# Patient Record
Sex: FEMALE | Race: WHITE | NOT HISPANIC OR LATINO | Employment: OTHER | URBAN - METROPOLITAN AREA
[De-identification: names, ages, dates, MRNs, and addresses within clinical notes are randomized per-mention and may not be internally consistent; named-entity substitution may affect disease eponyms.]

---

## 2023-09-02 PROBLEM — D50.9 IRON DEFICIENCY ANEMIA: Status: ACTIVE | Noted: 2023-09-02

## 2023-09-02 PROBLEM — H35.3131 EARLY DRY STAGE NONEXUDATIVE AGE-RELATED MACULAR DEGENERATION OF BOTH EYES: Status: ACTIVE | Noted: 2023-09-02

## 2023-09-02 PROBLEM — S72.143A INTERTROCHANTERIC FRACTURE (MULTI): Status: ACTIVE | Noted: 2023-09-02

## 2023-09-02 PROBLEM — M17.12 OSTEOARTHRITIS OF LEFT KNEE: Status: ACTIVE | Noted: 2023-09-02

## 2023-09-02 PROBLEM — M81.0 OSTEOPOROSIS: Status: ACTIVE | Noted: 2023-09-02

## 2023-09-02 PROBLEM — H52.7 UNSPECIFIED DISORDER OF REFRACTION: Status: ACTIVE | Noted: 2023-09-02

## 2023-09-02 PROBLEM — R60.9 PERIPHERAL EDEMA: Status: ACTIVE | Noted: 2023-09-02

## 2023-09-02 PROBLEM — R60.0 PERIPHERAL EDEMA: Status: ACTIVE | Noted: 2023-09-02

## 2023-09-02 PROBLEM — S06.0X0A CONCUSSION WITHOUT LOSS OF CONSCIOUSNESS: Status: ACTIVE | Noted: 2023-09-02

## 2023-09-02 PROBLEM — S49.90XA SHOULDER INJURY: Status: ACTIVE | Noted: 2023-09-02

## 2023-09-02 PROBLEM — I87.2 VENOUS INSUFFICIENCY (CHRONIC) (PERIPHERAL): Status: ACTIVE | Noted: 2023-09-02

## 2023-09-02 PROBLEM — S42.213A CLOSED FRACTURE OF SURGICAL NECK OF HUMERUS: Status: ACTIVE | Noted: 2023-09-02

## 2023-09-02 PROBLEM — E11.65 HYPERGLYCEMIA DUE TO TYPE 2 DIABETES MELLITUS (MULTI): Status: ACTIVE | Noted: 2023-09-02

## 2023-09-02 PROBLEM — M19.90 OSTEOARTHRITIS: Status: ACTIVE | Noted: 2023-09-02

## 2023-09-02 PROBLEM — I10 HTN (HYPERTENSION), BENIGN: Status: ACTIVE | Noted: 2023-09-02

## 2023-09-02 PROBLEM — Z96.652 STATUS POST TOTAL LEFT KNEE REPLACEMENT: Status: ACTIVE | Noted: 2023-09-02

## 2023-09-02 PROBLEM — E11.21 DIABETIC NEPHROPATHY WITH PROTEINURIA (MULTI): Status: ACTIVE | Noted: 2023-09-02

## 2023-09-02 PROBLEM — I49.9 IRREGULAR HEART RHYTHM: Status: ACTIVE | Noted: 2023-09-02

## 2023-09-02 PROBLEM — F33.9 RECURRENT MAJOR DEPRESSIVE DISORDER (CMS-HCC): Status: ACTIVE | Noted: 2023-09-02

## 2023-09-02 PROBLEM — E78.2 MIXED HYPERLIPIDEMIA: Status: ACTIVE | Noted: 2023-09-02

## 2023-09-02 PROBLEM — M17.11 OSTEOARTHRITIS OF RIGHT KNEE: Status: ACTIVE | Noted: 2023-09-02

## 2023-09-02 PROBLEM — H35.349 DEGENERATION OF MACULA DUE TO CYST OR HOLE, UNSPECIFIED LATERALITY: Status: ACTIVE | Noted: 2023-09-02

## 2023-09-02 PROBLEM — F41.9 ANXIETY: Status: ACTIVE | Noted: 2023-09-02

## 2023-09-02 PROBLEM — M25.461 EFFUSION, RIGHT KNEE: Status: ACTIVE | Noted: 2023-09-02

## 2023-09-02 PROBLEM — E11.9 ABNORMAL METABOLIC STATE DUE TO DIABETES MELLITUS (MULTI): Status: ACTIVE | Noted: 2023-09-02

## 2023-09-02 PROBLEM — E11.8 CONTROLLED TYPE 2 DIABETES MELLITUS WITH COMPLICATION, WITHOUT LONG-TERM CURRENT USE OF INSULIN (MULTI): Status: ACTIVE | Noted: 2023-09-02

## 2023-09-02 PROBLEM — H25.13 AGE-RELATED NUCLEAR CATARACT OF BOTH EYES: Status: ACTIVE | Noted: 2023-09-02

## 2023-09-02 PROBLEM — R09.02 HYPOXIA: Status: ACTIVE | Noted: 2023-09-02

## 2023-09-02 PROBLEM — M25.361 INSTABILITY OF RIGHT KNEE JOINT: Status: ACTIVE | Noted: 2023-09-02

## 2023-09-02 PROBLEM — D64.9 ANEMIA: Status: ACTIVE | Noted: 2023-09-02

## 2023-09-02 PROBLEM — M17.9 OSTEOARTHRITIS OF KNEE: Status: ACTIVE | Noted: 2023-09-02

## 2023-09-02 PROBLEM — I82.90 VENOUS THROMBOSIS: Status: ACTIVE | Noted: 2023-09-02

## 2023-09-02 PROBLEM — B86 SCABIES: Status: ACTIVE | Noted: 2023-09-02

## 2023-09-02 PROBLEM — Z91.89 AT RISK FOR BLEEDING: Status: ACTIVE | Noted: 2023-09-02

## 2023-09-02 PROBLEM — R06.89 IMPAIRED GAS EXCHANGE: Status: ACTIVE | Noted: 2023-09-02

## 2023-09-02 PROBLEM — M84.453A: Status: ACTIVE | Noted: 2023-09-02

## 2023-09-02 RX ORDER — DOCUSATE SODIUM 100 MG/1
100 CAPSULE, LIQUID FILLED ORAL 2 TIMES DAILY PRN
COMMUNITY

## 2023-09-02 RX ORDER — ATORVASTATIN CALCIUM 20 MG/1
20 TABLET, FILM COATED ORAL DAILY
COMMUNITY
End: 2023-11-13 | Stop reason: SDUPTHER

## 2023-09-02 RX ORDER — IVERMECTIN 3 MG/1
TABLET ORAL
COMMUNITY
Start: 2017-11-03 | End: 2023-10-16 | Stop reason: ALTCHOICE

## 2023-09-02 RX ORDER — LANOLIN ALCOHOL/MO/W.PET/CERES
100 CREAM (GRAM) TOPICAL DAILY
COMMUNITY

## 2023-09-02 RX ORDER — ACETAMINOPHEN, ASPIRIN (NSAID), AND CAFFEINE 250; 250; 65 MG/1; MG/1; MG/1
2 TABLET, FILM COATED ORAL 2 TIMES DAILY PRN
COMMUNITY
End: 2023-10-16 | Stop reason: ALTCHOICE

## 2023-09-02 RX ORDER — LANCETS
EACH MISCELLANEOUS DAILY
COMMUNITY
Start: 2020-03-05 | End: 2023-12-29 | Stop reason: ENTERED-IN-ERROR

## 2023-09-02 RX ORDER — ACETAMINOPHEN 325 MG/1
650 TABLET ORAL EVERY 4 HOURS PRN
COMMUNITY

## 2023-09-02 RX ORDER — LOSARTAN POTASSIUM 100 MG/1
100 TABLET ORAL DAILY
COMMUNITY
End: 2023-11-13 | Stop reason: SDUPTHER

## 2023-09-02 RX ORDER — SODIUM BICARBONATE 650 MG/1
650 TABLET ORAL 3 TIMES DAILY
COMMUNITY

## 2023-09-02 RX ORDER — CITALOPRAM 40 MG/1
40 TABLET, FILM COATED ORAL DAILY
COMMUNITY

## 2023-09-02 RX ORDER — OXYCODONE AND ACETAMINOPHEN 5; 325 MG/1; MG/1
1 TABLET ORAL EVERY 6 HOURS PRN
COMMUNITY
End: 2023-10-16 | Stop reason: ALTCHOICE

## 2023-09-02 RX ORDER — BUSPIRONE HYDROCHLORIDE 10 MG/1
10 TABLET ORAL 3 TIMES DAILY
COMMUNITY
Start: 2023-02-21 | End: 2023-11-13 | Stop reason: SDUPTHER

## 2023-09-08 ENCOUNTER — HOSPITAL ENCOUNTER (OUTPATIENT)
Dept: DATA CONVERSION | Facility: HOSPITAL | Age: 78
End: 2023-09-08
Attending: INTERNAL MEDICINE | Admitting: INTERNAL MEDICINE
Payer: MEDICARE

## 2023-09-08 DIAGNOSIS — K80.50 CALCULUS OF BILE DUCT WITHOUT CHOLANGITIS OR CHOLECYSTITIS WITHOUT OBSTRUCTION: ICD-10-CM

## 2023-09-08 DIAGNOSIS — K86.89 OTHER SPECIFIED DISEASES OF PANCREAS (HHS-HCC): ICD-10-CM

## 2023-09-08 DIAGNOSIS — K80.51 CALCULUS OF BILE DUCT WITHOUT CHOLANGITIS OR CHOLECYSTITIS WITH OBSTRUCTION: ICD-10-CM

## 2023-09-29 VITALS — HEIGHT: 63 IN | WEIGHT: 149.91 LBS | BODY MASS INDEX: 26.56 KG/M2

## 2023-10-10 ENCOUNTER — TELEPHONE (OUTPATIENT)
Dept: PRIMARY CARE | Facility: CLINIC | Age: 78
End: 2023-10-10
Payer: MEDICARE

## 2023-10-10 NOTE — TELEPHONE ENCOUNTER
From [employer] machine 10/10/23 @12:23pm:  Bryn is asking If you will follow patient for home care?  Please advise @[phone] option 1.

## 2023-10-11 NOTE — TELEPHONE ENCOUNTER
Bryn called to follow-up and see if Roselyn would follow her for home care. I let him know that Roselyn has never seen the patient and she doesn't have an appointment scheduled to see her in the future. He said that he would let his team know.

## 2023-10-12 PROBLEM — R79.89 ELEVATED FERRITIN: Status: ACTIVE | Noted: 2023-10-12

## 2023-10-12 PROBLEM — E11.9 ABNORMAL METABOLIC STATE DUE TO DIABETES MELLITUS (MULTI): Status: RESOLVED | Noted: 2023-09-02 | Resolved: 2023-10-12

## 2023-10-12 NOTE — PROGRESS NOTES
Subjective   Patient ID:   Adriana Duran is a 78 y.o. female who presents for Establish Care and Diabetes.  HPI  Diabetes:  Not on medication for this.  Last A1C was 5.4 in Feb 2022.  POC today is 5.7.    HTN:  Taking Losartan.  BP today is 90/62.  Denies dizziness, headaches.    HLD:  Taking Atorvastatin.  Last checked Feb 2023.    Anxiety/Depression:  Taking Buspar and Celexa.  Denies SI/HI.    OA/Femur fracture:  Seeing ortho.    Elevated ferritin:  Seen in Sep 2023.    Left great toe:  Seeing podiatry.    Health maintenance:  Smoking: Former smoker.  Labs: Feb 2023. DUE for A1C  Influenza:    Review of Systems  12 point review of systems negative unless stated above in HPI    Vitals:    10/16/23 1131   BP: 90/62   Pulse: 85   SpO2: 99%     Physical Exam  General: Alert and oriented, well nourished, no acute distress.  Lungs: Clear to auscultation, non-labored respiration.  Heart: Normal rate, regular rhythm, no murmur, gallop or edema.  Neurologic: Awake, alert, and oriented X3, CN II-XII intact.  Psychiatric: Cooperative, appropriate mood and affect.    Assessment/Plan   It was nice meeting you!  We will check ferritin/iron levels next visit.  Continue specialty care.  A1C is stable.  Continue the same medications.  Chronic conditions are stable.  Call with questions or concerns.    Follow up  3-4 months for A1C/ferritin  Diagnoses and all orders for this visit:  Controlled type 2 diabetes mellitus with complication, without long-term current use of insulin (CMS/[facility])  -     POCT glycosylated hemoglobin (Hb A1C) manually resulted  Diabetic nephropathy with proteinuria (CMS/[facility])  HTN (hypertension), benign  Mixed hyperlipidemia  Anxiety  Osteoarthritis of right knee, unspecified osteoarthritis type  Recurrent major depressive disorder, in partial remission (CMS/[facility])  Elevated ferritin  Pathological fracture of neck of femur, unspecified laterality, sequela  Closed nondisplaced intertrochanteric fracture of  femur, unspecified laterality, sequela  Type 2 diabetes mellitus with hyperglycemia, unspecified whether long term insulin use (CMS/[facility])

## 2023-10-16 ENCOUNTER — OFFICE VISIT (OUTPATIENT)
Dept: PRIMARY CARE | Facility: CLINIC | Age: 78
End: 2023-10-16
Payer: MEDICARE

## 2023-10-16 VITALS
OXYGEN SATURATION: 99 % | SYSTOLIC BLOOD PRESSURE: 90 MMHG | DIASTOLIC BLOOD PRESSURE: 62 MMHG | WEIGHT: 172 LBS | BODY MASS INDEX: 30.55 KG/M2 | HEART RATE: 85 BPM

## 2023-10-16 DIAGNOSIS — S72.146S CLOSED NONDISPLACED INTERTROCHANTERIC FRACTURE OF FEMUR, UNSPECIFIED LATERALITY, SEQUELA: ICD-10-CM

## 2023-10-16 DIAGNOSIS — M84.453S: ICD-10-CM

## 2023-10-16 DIAGNOSIS — I10 HTN (HYPERTENSION), BENIGN: ICD-10-CM

## 2023-10-16 DIAGNOSIS — M17.11 OSTEOARTHRITIS OF RIGHT KNEE, UNSPECIFIED OSTEOARTHRITIS TYPE: ICD-10-CM

## 2023-10-16 DIAGNOSIS — R79.89 ELEVATED FERRITIN: ICD-10-CM

## 2023-10-16 DIAGNOSIS — E11.65 TYPE 2 DIABETES MELLITUS WITH HYPERGLYCEMIA, UNSPECIFIED WHETHER LONG TERM INSULIN USE (MULTI): ICD-10-CM

## 2023-10-16 DIAGNOSIS — F41.9 ANXIETY: ICD-10-CM

## 2023-10-16 DIAGNOSIS — E78.2 MIXED HYPERLIPIDEMIA: ICD-10-CM

## 2023-10-16 DIAGNOSIS — E11.8 CONTROLLED TYPE 2 DIABETES MELLITUS WITH COMPLICATION, WITHOUT LONG-TERM CURRENT USE OF INSULIN (MULTI): Primary | ICD-10-CM

## 2023-10-16 DIAGNOSIS — E11.21 DIABETIC NEPHROPATHY WITH PROTEINURIA (MULTI): ICD-10-CM

## 2023-10-16 DIAGNOSIS — F33.41 RECURRENT MAJOR DEPRESSIVE DISORDER, IN PARTIAL REMISSION (CMS-HCC): ICD-10-CM

## 2023-10-16 DIAGNOSIS — S91.109S OPEN TOE WOUND, SEQUELA: ICD-10-CM

## 2023-10-16 PROCEDURE — 99214 OFFICE O/P EST MOD 30 MIN: CPT | Performed by: PHYSICIAN ASSISTANT

## 2023-10-16 PROCEDURE — 1160F RVW MEDS BY RX/DR IN RCRD: CPT | Performed by: PHYSICIAN ASSISTANT

## 2023-10-16 PROCEDURE — 1159F MED LIST DOCD IN RCRD: CPT | Performed by: PHYSICIAN ASSISTANT

## 2023-10-16 PROCEDURE — 1036F TOBACCO NON-USER: CPT | Performed by: PHYSICIAN ASSISTANT

## 2023-10-16 PROCEDURE — 1126F AMNT PAIN NOTED NONE PRSNT: CPT | Performed by: PHYSICIAN ASSISTANT

## 2023-10-16 PROCEDURE — 1124F ACP DISCUSS-NO DSCNMKR DOCD: CPT | Performed by: PHYSICIAN ASSISTANT

## 2023-10-16 PROCEDURE — 3078F DIAST BP <80 MM HG: CPT | Performed by: PHYSICIAN ASSISTANT

## 2023-10-16 PROCEDURE — 3074F SYST BP LT 130 MM HG: CPT | Performed by: PHYSICIAN ASSISTANT

## 2023-10-16 ASSESSMENT — LIFESTYLE VARIABLES
HOW MANY STANDARD DRINKS CONTAINING ALCOHOL DO YOU HAVE ON A TYPICAL DAY: PATIENT DOES NOT DRINK
HOW OFTEN DO YOU HAVE SIX OR MORE DRINKS ON ONE OCCASION: NEVER
HOW OFTEN DO YOU HAVE A DRINK CONTAINING ALCOHOL: NEVER
AUDIT-C TOTAL SCORE: 0
SKIP TO QUESTIONS 9-10: 1

## 2023-10-16 ASSESSMENT — PATIENT HEALTH QUESTIONNAIRE - PHQ9
2. FEELING DOWN, DEPRESSED OR HOPELESS: NOT AT ALL
1. LITTLE INTEREST OR PLEASURE IN DOING THINGS: SEVERAL DAYS
10. IF YOU CHECKED OFF ANY PROBLEMS, HOW DIFFICULT HAVE THESE PROBLEMS MADE IT FOR YOU TO DO YOUR WORK, TAKE CARE OF THINGS AT HOME, OR GET ALONG WITH OTHER PEOPLE: SOMEWHAT DIFFICULT
SUM OF ALL RESPONSES TO PHQ9 QUESTIONS 1 AND 2: 1

## 2023-10-16 ASSESSMENT — ENCOUNTER SYMPTOMS
LOSS OF SENSATION IN FEET: 0
OCCASIONAL FEELINGS OF UNSTEADINESS: 1
DEPRESSION: 0

## 2023-11-13 DIAGNOSIS — F41.9 ANXIETY: ICD-10-CM

## 2023-11-13 DIAGNOSIS — E78.2 MIXED HYPERLIPIDEMIA: ICD-10-CM

## 2023-11-13 DIAGNOSIS — I10 HTN (HYPERTENSION), BENIGN: Primary | ICD-10-CM

## 2023-11-13 RX ORDER — LOSARTAN POTASSIUM 100 MG/1
100 TABLET ORAL DAILY
Qty: 90 TABLET | Refills: 3 | Status: SHIPPED | OUTPATIENT
Start: 2023-11-13

## 2023-11-13 RX ORDER — ATORVASTATIN CALCIUM 20 MG/1
20 TABLET, FILM COATED ORAL DAILY
Qty: 90 TABLET | Refills: 3 | Status: SHIPPED | OUTPATIENT
Start: 2023-11-13

## 2023-11-13 RX ORDER — BUSPIRONE HYDROCHLORIDE 10 MG/1
10 TABLET ORAL 3 TIMES DAILY
Qty: 270 TABLET | Refills: 3 | Status: SHIPPED | OUTPATIENT
Start: 2023-11-13

## 2023-11-14 ENCOUNTER — OFFICE VISIT (OUTPATIENT)
Dept: ORTHOPEDIC SURGERY | Facility: CLINIC | Age: 78
End: 2023-11-14
Payer: MEDICARE

## 2023-11-14 VITALS — HEIGHT: 63 IN | WEIGHT: 171.96 LBS | BODY MASS INDEX: 30.47 KG/M2

## 2023-11-14 DIAGNOSIS — M25.361 KNEE INSTABILITY, RIGHT: ICD-10-CM

## 2023-11-14 DIAGNOSIS — M25.561 RIGHT KNEE PAIN, UNSPECIFIED CHRONICITY: Primary | ICD-10-CM

## 2023-11-14 DIAGNOSIS — M17.11 PRIMARY OSTEOARTHRITIS OF RIGHT KNEE: ICD-10-CM

## 2023-11-14 PROCEDURE — 20610 DRAIN/INJ JOINT/BURSA W/O US: CPT | Performed by: PHYSICIAN ASSISTANT

## 2023-11-14 PROCEDURE — 3074F SYST BP LT 130 MM HG: CPT | Performed by: PHYSICIAN ASSISTANT

## 2023-11-14 PROCEDURE — 1159F MED LIST DOCD IN RCRD: CPT | Performed by: PHYSICIAN ASSISTANT

## 2023-11-14 PROCEDURE — 3078F DIAST BP <80 MM HG: CPT | Performed by: PHYSICIAN ASSISTANT

## 2023-11-14 PROCEDURE — 99213 OFFICE O/P EST LOW 20 MIN: CPT | Performed by: PHYSICIAN ASSISTANT

## 2023-11-14 PROCEDURE — 1036F TOBACCO NON-USER: CPT | Performed by: PHYSICIAN ASSISTANT

## 2023-11-14 PROCEDURE — 1160F RVW MEDS BY RX/DR IN RCRD: CPT | Performed by: PHYSICIAN ASSISTANT

## 2023-11-14 PROCEDURE — 2500000004 HC RX 250 GENERAL PHARMACY W/ HCPCS (ALT 636 FOR OP/ED): Performed by: PHYSICIAN ASSISTANT

## 2023-11-14 PROCEDURE — 2500000005 HC RX 250 GENERAL PHARMACY W/O HCPCS: Performed by: PHYSICIAN ASSISTANT

## 2023-11-14 PROCEDURE — 1125F AMNT PAIN NOTED PAIN PRSNT: CPT | Performed by: PHYSICIAN ASSISTANT

## 2023-11-14 RX ORDER — LIDOCAINE HYDROCHLORIDE 10 MG/ML
1 INJECTION INFILTRATION; PERINEURAL
Status: COMPLETED | OUTPATIENT
Start: 2023-11-14 | End: 2023-11-14

## 2023-11-14 RX ORDER — TRIAMCINOLONE ACETONIDE 40 MG/ML
40 INJECTION, SUSPENSION INTRA-ARTICULAR; INTRAMUSCULAR
Status: COMPLETED | OUTPATIENT
Start: 2023-11-14 | End: 2023-11-14

## 2023-11-14 RX ADMIN — LIDOCAINE HYDROCHLORIDE 1 ML: 10 INJECTION, SOLUTION INFILTRATION; PERINEURAL at 14:17

## 2023-11-14 RX ADMIN — TRIAMCINOLONE ACETONIDE 40 MG: 40 INJECTION, SUSPENSION INTRA-ARTICULAR; INTRAMUSCULAR at 14:17

## 2023-11-14 ASSESSMENT — PATIENT HEALTH QUESTIONNAIRE - PHQ9
1. LITTLE INTEREST OR PLEASURE IN DOING THINGS: NOT AT ALL
SUM OF ALL RESPONSES TO PHQ9 QUESTIONS 1 AND 2: 0
2. FEELING DOWN, DEPRESSED OR HOPELESS: NOT AT ALL

## 2023-11-14 ASSESSMENT — ENCOUNTER SYMPTOMS
DEPRESSION: 0
LOSS OF SENSATION IN FEET: 0
OCCASIONAL FEELINGS OF UNSTEADINESS: 0

## 2023-11-14 ASSESSMENT — PAIN SCALES - GENERAL: PAINLEVEL_OUTOF10: 10 - WORST POSSIBLE PAIN

## 2023-11-14 ASSESSMENT — PAIN - FUNCTIONAL ASSESSMENT: PAIN_FUNCTIONAL_ASSESSMENT: 0-10

## 2023-11-14 ASSESSMENT — PAIN DESCRIPTION - DESCRIPTORS: DESCRIPTORS: ACHING;SHARP

## 2023-11-14 NOTE — PROGRESS NOTES
Subjective      Chief Complaint   Patient presents with    Right Knee - Pain        No surgery found     HPI  This 78-year-old young woman is status post left total knee replacement done by Dr. Castellano on 4-. She comes in today stating that her left knee pain is now completely improved from preoperatively. She is also s/p open reduction and internal fixation of the left hip fracture on 5-24-18 by Dr. Moya. However, she is having right knee pain (8/10) that is worse with and aggravated by walking. She states that the right knee pain impairs her ability to complete her activities of daily living. She denies any recent trauma or injury. She states that she is no longer recieving pain relief with cortisone injections. She is seen today in the presence of her  for cortisone injection to the right knee.    CARDIOLOGY:   Negative for chest pain, shortness of breath.   RESPIRATORY:   Negative for chest pain, shortness of breath.   MUSCULOSKELETAL:   See HPI for details.   NEUROLOGY:   Negative for tingling, numbness, weakness.    Objective    There were no vitals filed for this visit.    Physical Exam  GENERAL:          General Appearance:  This is a pleasant patient with appropriate affect, in no acute distress.   DERMATOLOGY:          Skin: skin at the neck, upper and lower back, and trunk is intact. There is no evidence of skin rash, skin breakdown or ulceration, or atrophic skin change.   EXTREMITIES:          Vascular:  Right, left hands and feet are warm with good color and pulses. Right and left calf and thigh are nontender and nonswollen.   NEUROLOGICAL:          Orientation:  Patient is alert and oriented to person, place, time and situation. Right and left upper and lower extremity motor and sensory examinations are intact.      MUSCULOSKELETAL: Neck: Nontender. No pain with range of motion. Left knee: Incision is clean, dry and healed. No effusion is present. Range of motion is 0-125 degrees and  painless. The patient is able to get from a sitting to a standing position and walks well independently with a stable gait and does not complain of any left knee pain with walking. Right knee: There is tenderness, crepitus and pain with range of motion and weightbearing. There is no effusion present. No tenderness in the right calf. Neurovascular is intact. The patient is able to walk with a painful gait favoring the right lower extremity while walking. Standing AP x-rays of both knees and lateral x-rays of both knees done and read in the office on 8- show that there is severe osteoarthritis of the right knee with complete loss of joint surface cartilage, bone-on-bone and sclerosis both at the medial and patellofemoral compartments.     Patient ID: Adriana Duran is a 78 y.o. female.    L Inj/Asp: R knee on 11/14/2023 2:17 PM  Indications: pain  Details: 22 G needle, medial approach  Medications: 40 mg triamcinolone acetonide 40 mg/mL; 1 mL lidocaine 10 mg/mL (1 %)  Outcome: tolerated well, no immediate complications  Procedure, treatment alternatives, risks and benefits explained, specific risks discussed. Immediately prior to procedure a time out was called to verify the correct patient, procedure, equipment, support staff and site/side marked as required. Patient was prepped and draped in the usual sterile fashion.         1. Right knee pain, unspecified chronicity        2. Primary osteoarthritis of right knee        3. Knee instability, right        Options are discussed with the patient in detail. The patient is instructed regarding activity modification, ice, provider directed at home gentle strengthening and ROM exercises, and the appropriate use of Tylenol as needed for pain with its potential adverse reactions and side effects. The patient understands. The patient states that despite all the treatment listed above that this right knee pain is debilitating and  requests a discussion of further  options. Cortisone injection to the right knee is discussed in the office today. This is done in the office today. See procedures below. Return in 6 weeks for surgical consultation with Dr. Castellano or sooner as needed. Please note that this report has been produced using speech recognition software.  It may contain errors related to grammar, punctuation or spelling.  Electronically signed, but not reviewed.  Citlalli Bynum PA-C

## 2023-12-07 ENCOUNTER — APPOINTMENT (OUTPATIENT)
Dept: OPHTHALMOLOGY | Facility: CLINIC | Age: 78
End: 2023-12-07
Payer: MEDICARE

## 2023-12-27 ENCOUNTER — APPOINTMENT (OUTPATIENT)
Dept: ORTHOPEDIC SURGERY | Facility: CLINIC | Age: 78
End: 2023-12-27
Payer: MEDICARE

## 2023-12-28 ENCOUNTER — APPOINTMENT (OUTPATIENT)
Dept: RADIOLOGY | Facility: HOSPITAL | Age: 78
End: 2023-12-28
Payer: MEDICARE

## 2023-12-28 ENCOUNTER — APPOINTMENT (OUTPATIENT)
Dept: CARDIOLOGY | Facility: HOSPITAL | Age: 78
End: 2023-12-28
Payer: MEDICARE

## 2023-12-28 ENCOUNTER — ANCILLARY PROCEDURE (OUTPATIENT)
Dept: EMERGENCY MEDICINE | Facility: HOSPITAL | Age: 78
DRG: 481 | End: 2023-12-28
Payer: MEDICARE

## 2023-12-28 ENCOUNTER — HOSPITAL ENCOUNTER (EMERGENCY)
Facility: HOSPITAL | Age: 78
Discharge: OTHER INSTITUTION | End: 2023-12-28
Attending: EMERGENCY MEDICINE
Payer: MEDICARE

## 2023-12-28 ENCOUNTER — APPOINTMENT (OUTPATIENT)
Dept: RADIOLOGY | Facility: HOSPITAL | Age: 78
DRG: 481 | End: 2023-12-28
Payer: MEDICARE

## 2023-12-28 ENCOUNTER — HOSPITAL ENCOUNTER (INPATIENT)
Facility: HOSPITAL | Age: 78
LOS: 6 days | Discharge: NURSING, REHAB OR LONG-TERM CARE | DRG: 481 | End: 2024-01-04
Attending: EMERGENCY MEDICINE | Admitting: STUDENT IN AN ORGANIZED HEALTH CARE EDUCATION/TRAINING PROGRAM
Payer: MEDICARE

## 2023-12-28 VITALS
BODY MASS INDEX: 35.62 KG/M2 | DIASTOLIC BLOOD PRESSURE: 53 MMHG | RESPIRATION RATE: 17 BRPM | WEIGHT: 201.06 LBS | TEMPERATURE: 98 F | HEART RATE: 78 BPM | OXYGEN SATURATION: 99 % | SYSTOLIC BLOOD PRESSURE: 118 MMHG | HEIGHT: 63 IN

## 2023-12-28 DIAGNOSIS — M97.8XXA PERIPROSTHETIC SUPRACONDYLAR FRACTURE OF FEMUR, INITIAL ENCOUNTER: Primary | ICD-10-CM
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
## 2023-12-28 DIAGNOSIS — S72.92XA CLOSED FRACTURE OF LEFT FEMUR, UNSPECIFIED FRACTURE MORPHOLOGY, UNSPECIFIED PORTION OF FEMUR, INITIAL ENCOUNTER (MULTI): ICD-10-CM

## 2023-12-28 DIAGNOSIS — Z96.659 PERIPROSTHETIC SUPRACONDYLAR FRACTURE OF FEMUR, INITIAL ENCOUNTER: Primary | ICD-10-CM

## 2023-12-28 DIAGNOSIS — R41.3 MEMORY LOSS: ICD-10-CM

## 2023-12-28 DIAGNOSIS — S72.402A CLOSED FRACTURE OF DISTAL END OF LEFT FEMUR, UNSPECIFIED FRACTURE MORPHOLOGY, INITIAL ENCOUNTER (MULTI): Primary | ICD-10-CM

## 2023-12-28 DIAGNOSIS — F41.9 ANXIETY: ICD-10-CM

## 2023-12-28 LAB
ANION GAP SERPL CALC-SCNC: 11 MMOL/L
BASOPHILS # BLD AUTO: 0.03 X10*3/UL (ref 0–0.1)
BASOPHILS NFR BLD AUTO: 0.6 %
BUN SERPL-MCNC: 23 MG/DL (ref 8–25)
CALCIUM SERPL-MCNC: 9.2 MG/DL (ref 8.5–10.4)
CHLORIDE SERPL-SCNC: 107 MMOL/L (ref 97–107)
CO2 SERPL-SCNC: 24 MMOL/L (ref 24–31)
CREAT SERPL-MCNC: 1.1 MG/DL (ref 0.4–1.6)
EOSINOPHIL # BLD AUTO: 0.19 X10*3/UL (ref 0–0.4)
EOSINOPHIL NFR BLD AUTO: 3.6 %
ERYTHROCYTE [DISTWIDTH] IN BLOOD BY AUTOMATED COUNT: 14.4 % (ref 11.5–14.5)
GFR SERPL CREATININE-BSD FRML MDRD: 51 ML/MIN/1.73M*2
GLUCOSE SERPL-MCNC: 102 MG/DL (ref 65–99)
HCT VFR BLD AUTO: 34.7 % (ref 36–46)
HGB BLD-MCNC: 10.7 G/DL (ref 12–16)
IMM GRANULOCYTES # BLD AUTO: 0.01 X10*3/UL (ref 0–0.5)
IMM GRANULOCYTES NFR BLD AUTO: 0.2 % (ref 0–0.9)
LYMPHOCYTES # BLD AUTO: 1.59 X10*3/UL (ref 0.8–3)
LYMPHOCYTES NFR BLD AUTO: 30.1 %
MCH RBC QN AUTO: 29.8 PG (ref 26–34)
MCHC RBC AUTO-ENTMCNC: 30.8 G/DL (ref 32–36)
MCV RBC AUTO: 97 FL (ref 80–100)
MONOCYTES # BLD AUTO: 0.45 X10*3/UL (ref 0.05–0.8)
MONOCYTES NFR BLD AUTO: 8.5 %
NEUTROPHILS # BLD AUTO: 3.02 X10*3/UL (ref 1.6–5.5)
NEUTROPHILS NFR BLD AUTO: 57 %
NRBC BLD-RTO: 0 /100 WBCS (ref 0–0)
PLATELET # BLD AUTO: 128 X10*3/UL (ref 150–450)
POTASSIUM SERPL-SCNC: 3.9 MMOL/L (ref 3.4–5.1)
RBC # BLD AUTO: 3.59 X10*6/UL (ref 4–5.2)
SODIUM SERPL-SCNC: 142 MMOL/L (ref 133–145)
WBC # BLD AUTO: 5.3 X10*3/UL (ref 4.4–11.3)

## 2023-12-28 PROCEDURE — 73700 CT LOWER EXTREMITY W/O DYE: CPT | Mod: LT

## 2023-12-28 PROCEDURE — 93005 ELECTROCARDIOGRAM TRACING: CPT

## 2023-12-28 PROCEDURE — 99285 EMERGENCY DEPT VISIT HI MDM: CPT | Performed by: EMERGENCY MEDICINE

## 2023-12-28 PROCEDURE — 80048 BASIC METABOLIC PNL TOTAL CA: CPT | Mod: CCI | Performed by: EMERGENCY MEDICINE

## 2023-12-28 PROCEDURE — 73590 X-RAY EXAM OF LOWER LEG: CPT | Mod: LT

## 2023-12-28 PROCEDURE — 85025 COMPLETE CBC W/AUTO DIFF WBC: CPT | Mod: 91 | Performed by: STUDENT IN AN ORGANIZED HEALTH CARE EDUCATION/TRAINING PROGRAM

## 2023-12-28 PROCEDURE — 93010 ELECTROCARDIOGRAM REPORT: CPT | Performed by: INTERNAL MEDICINE

## 2023-12-28 PROCEDURE — 99285 EMERGENCY DEPT VISIT HI MDM: CPT | Mod: 25 | Performed by: EMERGENCY MEDICINE

## 2023-12-28 PROCEDURE — 36415 COLL VENOUS BLD VENIPUNCTURE: CPT | Performed by: EMERGENCY MEDICINE

## 2023-12-28 PROCEDURE — 84075 ASSAY ALKALINE PHOSPHATASE: CPT | Performed by: STUDENT IN AN ORGANIZED HEALTH CARE EDUCATION/TRAINING PROGRAM

## 2023-12-28 PROCEDURE — 96374 THER/PROPH/DIAG INJ IV PUSH: CPT

## 2023-12-28 PROCEDURE — 96361 HYDRATE IV INFUSION ADD-ON: CPT

## 2023-12-28 PROCEDURE — 86920 COMPATIBILITY TEST SPIN: CPT | Mod: 91

## 2023-12-28 PROCEDURE — 96375 TX/PRO/DX INJ NEW DRUG ADDON: CPT

## 2023-12-28 PROCEDURE — 71045 X-RAY EXAM CHEST 1 VIEW: CPT | Mod: FOREIGN READ | Performed by: RADIOLOGY

## 2023-12-28 PROCEDURE — 73560 X-RAY EXAM OF KNEE 1 OR 2: CPT | Mod: LT

## 2023-12-28 PROCEDURE — 83880 ASSAY OF NATRIURETIC PEPTIDE: CPT

## 2023-12-28 PROCEDURE — 71045 X-RAY EXAM CHEST 1 VIEW: CPT | Mod: FR

## 2023-12-28 PROCEDURE — 73590 X-RAY EXAM OF LOWER LEG: CPT | Mod: LEFT SIDE | Performed by: RADIOLOGY

## 2023-12-28 PROCEDURE — 73502 X-RAY EXAM HIP UNI 2-3 VIEWS: CPT | Mod: LT

## 2023-12-28 PROCEDURE — 73552 X-RAY EXAM OF FEMUR 2/>: CPT | Mod: LT

## 2023-12-28 PROCEDURE — 2500000004 HC RX 250 GENERAL PHARMACY W/ HCPCS (ALT 636 FOR OP/ED): Performed by: EMERGENCY MEDICINE

## 2023-12-28 PROCEDURE — 85025 COMPLETE CBC W/AUTO DIFF WBC: CPT | Performed by: EMERGENCY MEDICINE

## 2023-12-28 PROCEDURE — 99284 EMERGENCY DEPT VISIT MOD MDM: CPT | Mod: 25

## 2023-12-28 PROCEDURE — 86900 BLOOD TYPING SEROLOGIC ABO: CPT | Mod: 91 | Performed by: STUDENT IN AN ORGANIZED HEALTH CARE EDUCATION/TRAINING PROGRAM

## 2023-12-28 PROCEDURE — 36415 COLL VENOUS BLD VENIPUNCTURE: CPT | Performed by: STUDENT IN AN ORGANIZED HEALTH CARE EDUCATION/TRAINING PROGRAM

## 2023-12-28 PROCEDURE — 85730 THROMBOPLASTIN TIME PARTIAL: CPT | Mod: 91 | Performed by: STUDENT IN AN ORGANIZED HEALTH CARE EDUCATION/TRAINING PROGRAM

## 2023-12-28 RX ORDER — ONDANSETRON HYDROCHLORIDE 2 MG/ML
4 INJECTION, SOLUTION INTRAVENOUS ONCE
Status: COMPLETED | OUTPATIENT
Start: 2023-12-28 | End: 2023-12-28

## 2023-12-28 RX ORDER — MORPHINE SULFATE 4 MG/ML
4 INJECTION, SOLUTION INTRAMUSCULAR; INTRAVENOUS ONCE
Status: COMPLETED | OUTPATIENT
Start: 2023-12-28 | End: 2023-12-28

## 2023-12-28 RX ORDER — KETOROLAC TROMETHAMINE 30 MG/ML
15 INJECTION, SOLUTION INTRAMUSCULAR; INTRAVENOUS ONCE
Status: COMPLETED | OUTPATIENT
Start: 2023-12-28 | End: 2023-12-28

## 2023-12-28 RX ADMIN — ONDANSETRON 4 MG: 2 INJECTION INTRAMUSCULAR; INTRAVENOUS at 15:44

## 2023-12-28 RX ADMIN — KETOROLAC TROMETHAMINE 15 MG: 30 INJECTION, SOLUTION INTRAMUSCULAR; INTRAVENOUS at 15:44

## 2023-12-28 RX ADMIN — SODIUM CHLORIDE 500 ML: 900 INJECTION, SOLUTION INTRAVENOUS at 15:44

## 2023-12-28 RX ADMIN — HYDROMORPHONE HYDROCHLORIDE 0.5 MG: 1 INJECTION, SOLUTION INTRAMUSCULAR; INTRAVENOUS; SUBCUTANEOUS at 17:15

## 2023-12-28 RX ADMIN — HYDROMORPHONE HYDROCHLORIDE 0.5 MG: 1 INJECTION, SOLUTION INTRAMUSCULAR; INTRAVENOUS; SUBCUTANEOUS at 16:45

## 2023-12-28 RX ADMIN — MORPHINE SULFATE 4 MG: 4 INJECTION, SOLUTION INTRAMUSCULAR; INTRAVENOUS at 15:44

## 2023-12-28 ASSESSMENT — LIFESTYLE VARIABLES
HAVE YOU EVER FELT YOU SHOULD CUT DOWN ON YOUR DRINKING: NO
REASON UNABLE TO ASSESS: NO
EVER FELT BAD OR GUILTY ABOUT YOUR DRINKING: NO
EVER HAD A DRINK FIRST THING IN THE MORNING TO STEADY YOUR NERVES TO GET RID OF A HANGOVER: NO
HAVE PEOPLE ANNOYED YOU BY CRITICIZING YOUR DRINKING: NO

## 2023-12-28 ASSESSMENT — COLUMBIA-SUICIDE SEVERITY RATING SCALE - C-SSRS
1. IN THE PAST MONTH, HAVE YOU WISHED YOU WERE DEAD OR WISHED YOU COULD GO TO SLEEP AND NOT WAKE UP?: NO
6. HAVE YOU EVER DONE ANYTHING, STARTED TO DO ANYTHING, OR PREPARED TO DO ANYTHING TO END YOUR LIFE?: NO
6. HAVE YOU EVER DONE ANYTHING, STARTED TO DO ANYTHING, OR PREPARED TO DO ANYTHING TO END YOUR LIFE?: NO
2. HAVE YOU ACTUALLY HAD ANY THOUGHTS OF KILLING YOURSELF?: NO
1. IN THE PAST MONTH, HAVE YOU WISHED YOU WERE DEAD OR WISHED YOU COULD GO TO SLEEP AND NOT WAKE UP?: NO
2. HAVE YOU ACTUALLY HAD ANY THOUGHTS OF KILLING YOURSELF?: NO

## 2023-12-28 ASSESSMENT — PAIN DESCRIPTION - LOCATION
LOCATION: KNEE
LOCATION: HIP

## 2023-12-28 ASSESSMENT — PAIN - FUNCTIONAL ASSESSMENT
PAIN_FUNCTIONAL_ASSESSMENT: 0-10
PAIN_FUNCTIONAL_ASSESSMENT: 0-10

## 2023-12-28 ASSESSMENT — PAIN DESCRIPTION - PAIN TYPE: TYPE: ACUTE PAIN

## 2023-12-28 ASSESSMENT — PAIN DESCRIPTION - ORIENTATION: ORIENTATION: LEFT

## 2023-12-28 ASSESSMENT — PAIN SCALES - GENERAL
PAINLEVEL_OUTOF10: 10 - WORST POSSIBLE PAIN
PAINLEVEL_OUTOF10: 9

## 2023-12-28 NOTE — PROGRESS NOTES
Expected patient en route to [facility] from [place].     Periprosethetic left femur fracture. Ortho to see. +/- trauma.       Dimple Alonzo MD  Emergency Medicine

## 2023-12-28 NOTE — ED PROVIDER NOTES
HPI   Chief Complaint   Patient presents with    Fall       78-year-old female presents for evaluation of left knee and leg pain following a mechanical fall.  States she was cleaning her house today, turned abruptly and twisted her knee causing her to fall to the ground.  States that she is having pain in this area only.  Does not believe that she hit her head or lost consciousness.  Does not take anticoagulants.  Denies any other areas of pain or injury.                          [place] Coma Scale Score: 15                  Patient History   Past Medical History:   Diagnosis Date    Arthritis     Chronic kidney disease     Diabetes mellitus (CMS/HCC)     Foot pain, left     bunion turned to wound    Right knee pain     UTI (urinary tract infection)      Past Surgical History:   Procedure Laterality Date    HIP FRACTURE SURGERY Left     KNEE SURGERY Left     replacement    KNEE SURGERY Right     arthritis removed    SHOULDER SURGERY Left     rotator cuff     Family History   Problem Relation Name Age of Onset    Heart disease Father      Cancer Father      Diabetes Paternal Grandfather       Social History     Tobacco Use    Smoking status: Former     Types: Cigarettes    Smokeless tobacco: Never   Vaping Use    Vaping Use: Never used   Substance Use Topics    Alcohol use: Never    Drug use: Never       Physical Exam   ED Triage Vitals   Temp Heart Rate Resp BP   12/28/23 1526 12/28/23 1526 12/28/23 1526 12/28/23 1529   36.8 °C (98.2 °F) 70 18 (!) 151/99      SpO2 Temp src Heart Rate Source Patient Position   12/28/23 1526 -- -- --   99 %         BP Location FiO2 (%)     -- --             Physical Exam  Vitals and nursing note reviewed.     General: Vitals reviewed. Awake, alert, well-developed, well-nourished, NAD, airway patent  HEENT: NC/AT, PERRL, MMM, no facial contusions,  no bleeding per nares, septum midline   Neck: Supple, trachea midline, no midline C-spine tenderness , step-offs , or deformities, no  expanding hematomas, no stridor   Respiratory: No respiratory distress, lungs clear to auscultation bilaterally, no wheezes, rhonchi, or rales  CV: Regular rate and regular rhythm, no murmur/gallop/rubs  Abdomen/GI: Soft, non-tender, non-distended, no rebound, guarding, or rigidity, normal bowel sounds  Extremities/MSK : Moving all extremities, deformity just above the left knee with tenderness palpation just proximal to the left knee, midline old and well-healed surgical scar, no redness or warmth, distal motor and sensory intact in all distributions with 2+ DP/PT pulses, compartments are soft, skin intact, some additional tenderness palpation throughout the left thigh and hip region, no other areas of bony tenderness palpation the other extremities and range of motion of all remaining joints within normal limits, no midline TLS tenderness, step-offs , or deformities   Neuro: A/O x3, GCS 15, cranial nerves intact, no new focal motor or sensory deficits  Skin: Warm, dry. No rashes identified    ED Course & MDM   ED Course as of 12/28/23 1809   Thu Dec 28, 2023   1643 From x-ray, pain worse hydromorphone ordered [COLEEN]   1657 EKG on my independent review: Sinus rhythm with sinus arrhythmia 77 bpm, left axis deviation, other than MO normal intervals, somewhat poor quality baseline no clear acute ST or T wave abnormalities [COLEEN]   1740 Call to Dr. Wu who states patient will require higher level of care recommends transfer to [facility] for trauma orthopedics evaluation [COLEEN]   1748 Patient resting comfortably [COLEEN]   1805 Discussed with, [facility] who recommends ER to ER transfer for their consultation and further management. [COLEEN]   1808 Rubi with Dr. Back who accepted ER to ER transfer [COLEEN]      ED Course User Index  [COLEEN] Jade Min MD         Diagnoses as of 12/28/23 1809   Closed fracture of distal end of left femur, unspecified fracture morphology, initial encounter (CMS/[facility])   Fall, initial encounter        Medical Decision Making  78-year-old female presents for mechanical fall with left knee/thigh injury.  No head injury or loss of consciousness.  ABCs intact, GCS 15.  She is not on anticoagulant does not appear to have any other evidence of injury.  Extremity is neurovascular intact without evidence of open fracture, compartment syndrome.  X-rays on my independent review with left distal femur fracture comminuted extending into the femoral compartment of her left knee prosthesis.  Consulted Dr. Wu, orthopedics who felt this needs higher level of care recommended transfer to [facility].  Excepted by orthopedics as well as emergency physician there in ER to ER transfer.  Patient's pain is controlled and transferred in stable condition.    Amount and/or Complexity of Data Reviewed  Independent Historian: EMS  Labs: ordered. Decision-making details documented in ED Course.  Radiology: ordered and independent interpretation performed. Decision-making details documented in ED Course.  ECG/medicine tests: ordered and independent interpretation performed. Decision-making details documented in ED Course.        Procedure  Procedures     Jade Min MD  12/28/23 6259

## 2023-12-28 NOTE — ED TRIAGE NOTES
Patient presents to the emergency department with a fall. Patient states she was vaccuming and twisted her knee and fell. Patient has outer deformity to left knee. Patient has positive MSP's. Patient denies hitting her head. Patient denies LOC. Patient denies blood thinners.

## 2023-12-29 ENCOUNTER — APPOINTMENT (OUTPATIENT)
Dept: CARDIOLOGY | Facility: HOSPITAL | Age: 78
DRG: 481 | End: 2023-12-29
Payer: MEDICARE

## 2023-12-29 ENCOUNTER — ANESTHESIA EVENT (OUTPATIENT)
Dept: OPERATING ROOM | Facility: HOSPITAL | Age: 78
DRG: 481 | End: 2023-12-29
Payer: MEDICARE

## 2023-12-29 ENCOUNTER — APPOINTMENT (OUTPATIENT)
Dept: RADIOLOGY | Facility: HOSPITAL | Age: 78
DRG: 481 | End: 2023-12-29
Payer: MEDICARE

## 2023-12-29 ENCOUNTER — ANESTHESIA (OUTPATIENT)
Dept: OPERATING ROOM | Facility: HOSPITAL | Age: 78
DRG: 481 | End: 2023-12-29
Payer: MEDICARE

## 2023-12-29 PROBLEM — M97.8XXA PERI-PROSTHETIC SUPRACONDYLAR FRACTURE OF FEMUR: Status: ACTIVE | Noted: 2023-12-28

## 2023-12-29 PROBLEM — Z96.659: Status: ACTIVE | Noted: 2023-12-29

## 2023-12-29 PROBLEM — M97.8XXA: Status: ACTIVE | Noted: 2023-12-29

## 2023-12-29 PROBLEM — Z96.659 PERI-PROSTHETIC SUPRACONDYLAR FRACTURE OF FEMUR: Status: ACTIVE | Noted: 2023-12-28

## 2023-12-29 LAB
ABO GROUP (TYPE) IN BLOOD: NORMAL
ABO GROUP (TYPE) IN BLOOD: NORMAL
ALBUMIN SERPL BCP-MCNC: 3.6 G/DL (ref 3.4–5)
ALP SERPL-CCNC: 68 U/L (ref 33–136)
ALT SERPL W P-5'-P-CCNC: 16 U/L (ref 7–45)
ANION GAP SERPL CALC-SCNC: 14 MMOL/L (ref 10–20)
ANTIBODY SCREEN: NORMAL
APTT PPP: 28 SECONDS (ref 27–38)
AST SERPL W P-5'-P-CCNC: 26 U/L (ref 9–39)
ATRIAL RATE: 92 BPM
BASOPHILS # BLD AUTO: 0.03 X10*3/UL (ref 0–0.1)
BASOPHILS NFR BLD AUTO: 0.3 %
BILIRUB SERPL-MCNC: 0.5 MG/DL (ref 0–1.2)
BNP SERPL-MCNC: 152 PG/ML (ref 0–99)
BUN SERPL-MCNC: 26 MG/DL (ref 6–23)
CALCIUM SERPL-MCNC: 9.1 MG/DL (ref 8.6–10.6)
CHLORIDE SERPL-SCNC: 109 MMOL/L (ref 98–107)
CO2 SERPL-SCNC: 23 MMOL/L (ref 21–32)
CREAT SERPL-MCNC: 1.13 MG/DL (ref 0.5–1.05)
EOSINOPHIL # BLD AUTO: 0.02 X10*3/UL (ref 0–0.4)
EOSINOPHIL NFR BLD AUTO: 0.2 %
ERYTHROCYTE [DISTWIDTH] IN BLOOD BY AUTOMATED COUNT: 14.2 % (ref 11.5–14.5)
GFR SERPL CREATININE-BSD FRML MDRD: 50 ML/MIN/1.73M*2
GLUCOSE BLD MANUAL STRIP-MCNC: 121 MG/DL (ref 74–99)
GLUCOSE BLD MANUAL STRIP-MCNC: 157 MG/DL (ref 74–99)
GLUCOSE SERPL-MCNC: 176 MG/DL (ref 74–99)
HCT VFR BLD AUTO: 29.4 % (ref 36–46)
HGB BLD-MCNC: 9.8 G/DL (ref 12–16)
IMM GRANULOCYTES # BLD AUTO: 0.02 X10*3/UL (ref 0–0.5)
IMM GRANULOCYTES NFR BLD AUTO: 0.2 % (ref 0–0.9)
INR PPP: 1 (ref 0.9–1.1)
LYMPHOCYTES # BLD AUTO: 0.97 X10*3/UL (ref 0.8–3)
LYMPHOCYTES NFR BLD AUTO: 10.1 %
MCH RBC QN AUTO: 30.3 PG (ref 26–34)
MCHC RBC AUTO-ENTMCNC: 33.3 G/DL (ref 32–36)
MCV RBC AUTO: 91 FL (ref 80–100)
MONOCYTES # BLD AUTO: 0.51 X10*3/UL (ref 0.05–0.8)
MONOCYTES NFR BLD AUTO: 5.3 %
NEUTROPHILS # BLD AUTO: 8.01 X10*3/UL (ref 1.6–5.5)
NEUTROPHILS NFR BLD AUTO: 83.9 %
NRBC BLD-RTO: 0 /100 WBCS (ref 0–0)
PLATELET # BLD AUTO: 112 X10*3/UL (ref 150–450)
POTASSIUM SERPL-SCNC: 4.6 MMOL/L (ref 3.5–5.3)
PROT SERPL-MCNC: 6.4 G/DL (ref 6.4–8.2)
PROTHROMBIN TIME: 10.9 SECONDS (ref 9.8–12.8)
Q ONSET: 214 MS
QRS COUNT: 14 BEATS
QRS DURATION: 84 MS
QT INTERVAL: 362 MS
QTC CALCULATION(BAZETT): 425 MS
QTC FREDERICIA: 403 MS
R AXIS: -11 DEGREES
RBC # BLD AUTO: 3.23 X10*6/UL (ref 4–5.2)
RH FACTOR (ANTIGEN D): NORMAL
RH FACTOR (ANTIGEN D): NORMAL
SODIUM SERPL-SCNC: 141 MMOL/L (ref 136–145)
T AXIS: 42 DEGREES
T OFFSET: 395 MS
VENTRICULAR RATE: 83 BPM
WBC # BLD AUTO: 9.6 X10*3/UL (ref 4.4–11.3)

## 2023-12-29 PROCEDURE — A4217 STERILE WATER/SALINE, 500 ML: HCPCS | Performed by: ORTHOPAEDIC SURGERY

## 2023-12-29 PROCEDURE — 27506 TREATMENT OF THIGH FRACTURE: CPT | Performed by: ORTHOPAEDIC SURGERY

## 2023-12-29 PROCEDURE — 82947 ASSAY GLUCOSE BLOOD QUANT: CPT

## 2023-12-29 PROCEDURE — 2500000004 HC RX 250 GENERAL PHARMACY W/ HCPCS (ALT 636 FOR OP/ED): Performed by: ANESTHESIOLOGIST ASSISTANT

## 2023-12-29 PROCEDURE — 2780000003 HC OR 278 NO HCPCS: Performed by: ORTHOPAEDIC SURGERY

## 2023-12-29 PROCEDURE — 2500000005 HC RX 250 GENERAL PHARMACY W/O HCPCS: Performed by: ANESTHESIOLOGIST ASSISTANT

## 2023-12-29 PROCEDURE — 2500000004 HC RX 250 GENERAL PHARMACY W/ HCPCS (ALT 636 FOR OP/ED)

## 2023-12-29 PROCEDURE — S0166 INJ OLANZAPINE 2.5MG: HCPCS

## 2023-12-29 PROCEDURE — 93005 ELECTROCARDIOGRAM TRACING: CPT

## 2023-12-29 PROCEDURE — 96372 THER/PROPH/DIAG INJ SC/IM: CPT

## 2023-12-29 PROCEDURE — 93010 ELECTROCARDIOGRAM REPORT: CPT | Performed by: INTERNAL MEDICINE

## 2023-12-29 PROCEDURE — 2500000001 HC RX 250 WO HCPCS SELF ADMINISTERED DRUGS (ALT 637 FOR MEDICARE OP)

## 2023-12-29 PROCEDURE — 94760 N-INVAS EAR/PLS OXIMETRY 1: CPT

## 2023-12-29 PROCEDURE — 3600000009 HC OR TIME - EACH INCREMENTAL 1 MINUTE - PROCEDURE LEVEL FOUR: Performed by: ORTHOPAEDIC SURGERY

## 2023-12-29 PROCEDURE — 76000 FLUOROSCOPY <1 HR PHYS/QHP: CPT | Mod: RSC

## 2023-12-29 PROCEDURE — 3700000002 HC GENERAL ANESTHESIA TIME - EACH INCREMENTAL 1 MINUTE: Performed by: ORTHOPAEDIC SURGERY

## 2023-12-29 PROCEDURE — 27506 TREATMENT OF THIGH FRACTURE: CPT | Performed by: STUDENT IN AN ORGANIZED HEALTH CARE EDUCATION/TRAINING PROGRAM

## 2023-12-29 PROCEDURE — 2720000007 HC OR 272 NO HCPCS: Performed by: ORTHOPAEDIC SURGERY

## 2023-12-29 PROCEDURE — 99222 1ST HOSP IP/OBS MODERATE 55: CPT | Performed by: ORTHOPAEDIC SURGERY

## 2023-12-29 PROCEDURE — C1713 ANCHOR/SCREW BN/BN,TIS/BN: HCPCS | Performed by: ORTHOPAEDIC SURGERY

## 2023-12-29 PROCEDURE — 27187 REINFORCE HIP BONES: CPT | Performed by: ORTHOPAEDIC SURGERY

## 2023-12-29 PROCEDURE — 73700 CT LOWER EXTREMITY W/O DYE: CPT | Mod: LEFT SIDE | Performed by: RADIOLOGY

## 2023-12-29 PROCEDURE — P9045 ALBUMIN (HUMAN), 5%, 250 ML: HCPCS | Mod: JZ | Performed by: ANESTHESIOLOGIST ASSISTANT

## 2023-12-29 PROCEDURE — 0QS936Z REPOSITION LEFT FEMORAL SHAFT WITH INTRAMEDULLARY INTERNAL FIXATION DEVICE, PERCUTANEOUS APPROACH: ICD-10-PCS | Performed by: STUDENT IN AN ORGANIZED HEALTH CARE EDUCATION/TRAINING PROGRAM

## 2023-12-29 PROCEDURE — A27506 PR OPEN RX FEMUR FX+INTRAMED ROD: Performed by: STUDENT IN AN ORGANIZED HEALTH CARE EDUCATION/TRAINING PROGRAM

## 2023-12-29 PROCEDURE — 7100000001 HC RECOVERY ROOM TIME - INITIAL BASE CHARGE: Performed by: ORTHOPAEDIC SURGERY

## 2023-12-29 PROCEDURE — C1776 JOINT DEVICE (IMPLANTABLE): HCPCS | Performed by: ORTHOPAEDIC SURGERY

## 2023-12-29 PROCEDURE — 99223 1ST HOSP IP/OBS HIGH 75: CPT

## 2023-12-29 PROCEDURE — 1200000002 HC GENERAL ROOM WITH TELEMETRY DAILY

## 2023-12-29 PROCEDURE — 27187 REINFORCE HIP BONES: CPT | Performed by: STUDENT IN AN ORGANIZED HEALTH CARE EDUCATION/TRAINING PROGRAM

## 2023-12-29 PROCEDURE — 2500000004 HC RX 250 GENERAL PHARMACY W/ HCPCS (ALT 636 FOR OP/ED): Performed by: STUDENT IN AN ORGANIZED HEALTH CARE EDUCATION/TRAINING PROGRAM

## 2023-12-29 PROCEDURE — 7100000002 HC RECOVERY ROOM TIME - EACH INCREMENTAL 1 MINUTE: Performed by: ORTHOPAEDIC SURGERY

## 2023-12-29 PROCEDURE — A27506 PR OPEN RX FEMUR FX+INTRAMED ROD: Performed by: ANESTHESIOLOGIST ASSISTANT

## 2023-12-29 PROCEDURE — 3700000001 HC GENERAL ANESTHESIA TIME - INITIAL BASE CHARGE: Performed by: ORTHOPAEDIC SURGERY

## 2023-12-29 PROCEDURE — 99100 ANES PT EXTEME AGE<1 YR&>70: CPT | Performed by: STUDENT IN AN ORGANIZED HEALTH CARE EDUCATION/TRAINING PROGRAM

## 2023-12-29 PROCEDURE — 36415 COLL VENOUS BLD VENIPUNCTURE: CPT | Performed by: INTERNAL MEDICINE

## 2023-12-29 PROCEDURE — 2W6RXZZ TRACTION OF LEFT LOWER LEG: ICD-10-PCS | Performed by: STUDENT IN AN ORGANIZED HEALTH CARE EDUCATION/TRAINING PROGRAM

## 2023-12-29 PROCEDURE — 2500000004 HC RX 250 GENERAL PHARMACY W/ HCPCS (ALT 636 FOR OP/ED): Performed by: ORTHOPAEDIC SURGERY

## 2023-12-29 PROCEDURE — 3600000004 HC OR TIME - INITIAL BASE CHARGE - PROCEDURE LEVEL FOUR: Performed by: ORTHOPAEDIC SURGERY

## 2023-12-29 DEVICE — IMPLANTABLE DEVICE: Type: IMPLANTABLE DEVICE | Site: FEMUR | Status: FUNCTIONAL

## 2023-12-29 DEVICE — SCREW, CORTICAL, SELF-TAPPING, 3.5 X 32 MM, STAINLESS STEEL: Type: IMPLANTABLE DEVICE | Site: FEMUR | Status: FUNCTIONAL

## 2023-12-29 DEVICE — IMPLANTABLE DEVICE: Type: IMPLANTABLE DEVICE | Site: FEMUR | Status: NON-FUNCTIONAL

## 2023-12-29 DEVICE — END CAP, SCN, FULLY THREADED, T2: Type: IMPLANTABLE DEVICE | Site: FEMUR | Status: FUNCTIONAL

## 2023-12-29 DEVICE — PLATE LCP 3.5 X 98MM 7H: Type: IMPLANTABLE DEVICE | Site: FEMUR | Status: FUNCTIONAL

## 2023-12-29 DEVICE — SCREW, ADVANCED LOCKING, 5 X 70MM: Type: IMPLANTABLE DEVICE | Site: FEMUR | Status: FUNCTIONAL

## 2023-12-29 DEVICE — SCREW, CORTICAL, SELF-TAPPING, 3.5 X 38 MM, STAINLESS STEEL: Type: IMPLANTABLE DEVICE | Site: FEMUR | Status: FUNCTIONAL

## 2023-12-29 DEVICE — SCREW, CORTICAL, SELF-TAPPING, 3.5 X 34 MM, STAINLESS STEEL: Type: IMPLANTABLE DEVICE | Site: FEMUR | Status: FUNCTIONAL

## 2023-12-29 DEVICE — SCREW, LOCKING, 5 X 37.5MM: Type: IMPLANTABLE DEVICE | Site: FEMUR | Status: FUNCTIONAL

## 2023-12-29 DEVICE — SCREW, ADVANCED LOCKING, 5 X 75MM: Type: IMPLANTABLE DEVICE | Site: FEMUR | Status: FUNCTIONAL

## 2023-12-29 RX ORDER — ONDANSETRON HYDROCHLORIDE 2 MG/ML
INJECTION, SOLUTION INTRAVENOUS AS NEEDED
Status: DISCONTINUED | OUTPATIENT
Start: 2023-12-29 | End: 2023-12-29

## 2023-12-29 RX ORDER — VANCOMYCIN HYDROCHLORIDE 1 G/20ML
INJECTION, POWDER, LYOPHILIZED, FOR SOLUTION INTRAVENOUS AS NEEDED
Status: DISCONTINUED | OUTPATIENT
Start: 2023-12-29 | End: 2023-12-29 | Stop reason: HOSPADM

## 2023-12-29 RX ORDER — LIDOCAINE HYDROCHLORIDE 10 MG/ML
0.1 INJECTION INFILTRATION; PERINEURAL ONCE
Status: DISCONTINUED | OUTPATIENT
Start: 2023-12-29 | End: 2023-12-29 | Stop reason: HOSPADM

## 2023-12-29 RX ORDER — ENOXAPARIN SODIUM 100 MG/ML
40 INJECTION SUBCUTANEOUS DAILY
Status: DISCONTINUED | OUTPATIENT
Start: 2023-12-29 | End: 2024-01-04 | Stop reason: HOSPADM

## 2023-12-29 RX ORDER — SODIUM CHLORIDE, SODIUM LACTATE, POTASSIUM CHLORIDE, CALCIUM CHLORIDE 600; 310; 30; 20 MG/100ML; MG/100ML; MG/100ML; MG/100ML
100 INJECTION, SOLUTION INTRAVENOUS CONTINUOUS
Status: DISCONTINUED | OUTPATIENT
Start: 2023-12-29 | End: 2023-12-29 | Stop reason: HOSPADM

## 2023-12-29 RX ORDER — CEFAZOLIN 1 G/1
INJECTION, POWDER, FOR SOLUTION INTRAVENOUS AS NEEDED
Status: DISCONTINUED | OUTPATIENT
Start: 2023-12-29 | End: 2023-12-29

## 2023-12-29 RX ORDER — CEFAZOLIN SODIUM 2 G/100ML
2 INJECTION, SOLUTION INTRAVENOUS EVERY 8 HOURS
Status: COMPLETED | OUTPATIENT
Start: 2023-12-29 | End: 2023-12-30

## 2023-12-29 RX ORDER — BISMUTH SUBSALICYLATE 262 MG
1 TABLET,CHEWABLE ORAL DAILY
COMMUNITY

## 2023-12-29 RX ORDER — ACETAMINOPHEN 325 MG/1
975 TABLET ORAL EVERY 6 HOURS PRN
Status: DISCONTINUED | OUTPATIENT
Start: 2023-12-29 | End: 2024-01-02

## 2023-12-29 RX ORDER — PHENYLEPHRINE HCL IN 0.9% NACL 0.4MG/10ML
SYRINGE (ML) INTRAVENOUS AS NEEDED
Status: DISCONTINUED | OUTPATIENT
Start: 2023-12-29 | End: 2023-12-29

## 2023-12-29 RX ORDER — LABETALOL HYDROCHLORIDE 5 MG/ML
5 INJECTION, SOLUTION INTRAVENOUS ONCE AS NEEDED
Status: DISCONTINUED | OUTPATIENT
Start: 2023-12-29 | End: 2023-12-29 | Stop reason: HOSPADM

## 2023-12-29 RX ORDER — HYDROMORPHONE HYDROCHLORIDE 1 MG/ML
0.1 INJECTION, SOLUTION INTRAMUSCULAR; INTRAVENOUS; SUBCUTANEOUS EVERY 5 MIN PRN
Status: DISCONTINUED | OUTPATIENT
Start: 2023-12-29 | End: 2023-12-29 | Stop reason: HOSPADM

## 2023-12-29 RX ORDER — ALBUMIN HUMAN 50 G/1000ML
SOLUTION INTRAVENOUS AS NEEDED
Status: DISCONTINUED | OUTPATIENT
Start: 2023-12-29 | End: 2023-12-29

## 2023-12-29 RX ORDER — ALBUTEROL SULFATE 0.83 MG/ML
2.5 SOLUTION RESPIRATORY (INHALATION) ONCE AS NEEDED
Status: DISCONTINUED | OUTPATIENT
Start: 2023-12-29 | End: 2023-12-29 | Stop reason: HOSPADM

## 2023-12-29 RX ORDER — HYDROMORPHONE HYDROCHLORIDE 1 MG/ML
0.2 INJECTION, SOLUTION INTRAMUSCULAR; INTRAVENOUS; SUBCUTANEOUS EVERY 5 MIN PRN
Status: DISCONTINUED | OUTPATIENT
Start: 2023-12-29 | End: 2023-12-29 | Stop reason: HOSPADM

## 2023-12-29 RX ORDER — BUSPIRONE HYDROCHLORIDE 10 MG/1
10 TABLET ORAL 3 TIMES DAILY
Status: DISCONTINUED | OUTPATIENT
Start: 2023-12-29 | End: 2024-01-04 | Stop reason: HOSPADM

## 2023-12-29 RX ORDER — ACETAMINOPHEN 325 MG/1
650 TABLET ORAL EVERY 4 HOURS PRN
Status: DISCONTINUED | OUTPATIENT
Start: 2023-12-29 | End: 2023-12-29 | Stop reason: HOSPADM

## 2023-12-29 RX ORDER — LOSARTAN POTASSIUM 100 MG/1
100 TABLET ORAL DAILY
Status: DISCONTINUED | OUTPATIENT
Start: 2023-12-29 | End: 2024-01-04 | Stop reason: HOSPADM

## 2023-12-29 RX ORDER — POLYETHYLENE GLYCOL 3350 17 G/17G
17 POWDER, FOR SOLUTION ORAL DAILY
Status: DISCONTINUED | OUTPATIENT
Start: 2023-12-29 | End: 2024-01-04 | Stop reason: HOSPADM

## 2023-12-29 RX ORDER — OLANZAPINE 10 MG/2ML
2.5 INJECTION, POWDER, FOR SOLUTION INTRAMUSCULAR EVERY 6 HOURS PRN
Status: DISCONTINUED | OUTPATIENT
Start: 2023-12-29 | End: 2024-01-04 | Stop reason: HOSPADM

## 2023-12-29 RX ORDER — OXYCODONE HYDROCHLORIDE 5 MG/1
10 TABLET ORAL EVERY 6 HOURS PRN
Status: DISCONTINUED | OUTPATIENT
Start: 2023-12-29 | End: 2023-12-29

## 2023-12-29 RX ORDER — LIDOCAINE HCL/PF 100 MG/5ML
SYRINGE (ML) INTRAVENOUS AS NEEDED
Status: DISCONTINUED | OUTPATIENT
Start: 2023-12-29 | End: 2023-12-29

## 2023-12-29 RX ORDER — DULOXETIN HYDROCHLORIDE 60 MG/1
60 CAPSULE, DELAYED RELEASE ORAL DAILY
Status: DISCONTINUED | OUTPATIENT
Start: 2023-12-29 | End: 2024-01-04 | Stop reason: HOSPADM

## 2023-12-29 RX ORDER — NORETHINDRONE AND ETHINYL ESTRADIOL 0.5-0.035
KIT ORAL AS NEEDED
Status: DISCONTINUED | OUTPATIENT
Start: 2023-12-29 | End: 2023-12-29

## 2023-12-29 RX ORDER — SODIUM CHLORIDE 0.9 G/100ML
IRRIGANT IRRIGATION AS NEEDED
Status: DISCONTINUED | OUTPATIENT
Start: 2023-12-29 | End: 2023-12-29 | Stop reason: HOSPADM

## 2023-12-29 RX ORDER — MAGNESIUM SULFATE HEPTAHYDRATE 500 MG/ML
INJECTION, SOLUTION INTRAMUSCULAR; INTRAVENOUS AS NEEDED
Status: DISCONTINUED | OUTPATIENT
Start: 2023-12-29 | End: 2023-12-29

## 2023-12-29 RX ORDER — OXYCODONE HYDROCHLORIDE 5 MG/1
5 TABLET ORAL EVERY 4 HOURS PRN
Status: DISCONTINUED | OUTPATIENT
Start: 2023-12-29 | End: 2024-01-02

## 2023-12-29 RX ORDER — DEXTROSE MONOHYDRATE AND SODIUM CHLORIDE 5; .9 G/100ML; G/100ML
125 INJECTION, SOLUTION INTRAVENOUS CONTINUOUS
Status: DISCONTINUED | OUTPATIENT
Start: 2023-12-29 | End: 2024-01-04 | Stop reason: HOSPADM

## 2023-12-29 RX ORDER — HYDRALAZINE HYDROCHLORIDE 20 MG/ML
5 INJECTION INTRAMUSCULAR; INTRAVENOUS EVERY 30 MIN PRN
Status: DISCONTINUED | OUTPATIENT
Start: 2023-12-29 | End: 2023-12-29 | Stop reason: HOSPADM

## 2023-12-29 RX ORDER — OXYCODONE HYDROCHLORIDE 5 MG/1
10 TABLET ORAL EVERY 4 HOURS PRN
Status: DISCONTINUED | OUTPATIENT
Start: 2023-12-29 | End: 2023-12-29

## 2023-12-29 RX ORDER — ROCURONIUM BROMIDE 10 MG/ML
INJECTION, SOLUTION INTRAVENOUS AS NEEDED
Status: DISCONTINUED | OUTPATIENT
Start: 2023-12-29 | End: 2023-12-29

## 2023-12-29 RX ORDER — ONDANSETRON HYDROCHLORIDE 2 MG/ML
4 INJECTION, SOLUTION INTRAVENOUS ONCE AS NEEDED
Status: DISCONTINUED | OUTPATIENT
Start: 2023-12-29 | End: 2023-12-29 | Stop reason: HOSPADM

## 2023-12-29 RX ORDER — FERROUS SULFATE, DRIED 160(50) MG
1 TABLET, EXTENDED RELEASE ORAL 2 TIMES DAILY
Qty: 180 TABLET | Refills: 0 | Status: SHIPPED | OUTPATIENT
Start: 2023-12-29 | End: 2024-03-28

## 2023-12-29 RX ORDER — PROPOFOL 10 MG/ML
INJECTION, EMULSION INTRAVENOUS AS NEEDED
Status: DISCONTINUED | OUTPATIENT
Start: 2023-12-29 | End: 2023-12-29

## 2023-12-29 RX ORDER — FENTANYL CITRATE 50 UG/ML
INJECTION, SOLUTION INTRAMUSCULAR; INTRAVENOUS AS NEEDED
Status: DISCONTINUED | OUTPATIENT
Start: 2023-12-29 | End: 2023-12-29

## 2023-12-29 RX ORDER — HYDROMORPHONE HYDROCHLORIDE 1 MG/ML
0.2 INJECTION, SOLUTION INTRAMUSCULAR; INTRAVENOUS; SUBCUTANEOUS
Status: DISCONTINUED | OUTPATIENT
Start: 2023-12-29 | End: 2024-01-01

## 2023-12-29 RX ORDER — ATORVASTATIN CALCIUM 20 MG/1
20 TABLET, FILM COATED ORAL NIGHTLY
Status: DISCONTINUED | OUTPATIENT
Start: 2023-12-29 | End: 2024-01-04 | Stop reason: HOSPADM

## 2023-12-29 RX ORDER — ACETAMINOPHEN 500 MG
5 TABLET ORAL NIGHTLY
Status: DISCONTINUED | OUTPATIENT
Start: 2023-12-29 | End: 2024-01-04 | Stop reason: HOSPADM

## 2023-12-29 RX ORDER — CITALOPRAM 40 MG/1
40 TABLET, FILM COATED ORAL DAILY
Status: DISCONTINUED | OUTPATIENT
Start: 2023-12-29 | End: 2023-12-31

## 2023-12-29 RX ORDER — OXYCODONE HYDROCHLORIDE 5 MG/1
5 TABLET ORAL EVERY 4 HOURS PRN
Status: DISCONTINUED | OUTPATIENT
Start: 2023-12-29 | End: 2024-01-04 | Stop reason: HOSPADM

## 2023-12-29 RX ORDER — DULOXETIN HYDROCHLORIDE 60 MG/1
60 CAPSULE, DELAYED RELEASE ORAL DAILY
COMMUNITY
End: 2024-05-08 | Stop reason: SDUPTHER

## 2023-12-29 RX ADMIN — SODIUM CHLORIDE, POTASSIUM CHLORIDE, SODIUM LACTATE AND CALCIUM CHLORIDE 250 ML: 600; 310; 30; 20 INJECTION, SOLUTION INTRAVENOUS at 11:13

## 2023-12-29 RX ADMIN — Medication 160 MCG: at 09:16

## 2023-12-29 RX ADMIN — ALBUMIN HUMAN 250 ML: 0.05 INJECTION, SOLUTION INTRAVENOUS at 08:02

## 2023-12-29 RX ADMIN — ALBUMIN HUMAN 250 ML: 0.05 INJECTION, SOLUTION INTRAVENOUS at 08:12

## 2023-12-29 RX ADMIN — ATORVASTATIN CALCIUM 20 MG: 20 TABLET, FILM COATED ORAL at 20:43

## 2023-12-29 RX ADMIN — SUGAMMADEX 200 MG: 100 INJECTION, SOLUTION INTRAVENOUS at 10:00

## 2023-12-29 RX ADMIN — PROPOFOL 25 MG: 10 INJECTION, EMULSION INTRAVENOUS at 10:04

## 2023-12-29 RX ADMIN — CEFAZOLIN SODIUM 2 G: 2 INJECTION, SOLUTION INTRAVENOUS at 20:43

## 2023-12-29 RX ADMIN — SODIUM CHLORIDE, POTASSIUM CHLORIDE, SODIUM LACTATE AND CALCIUM CHLORIDE 100 ML/HR: 600; 310; 30; 20 INJECTION, SOLUTION INTRAVENOUS at 10:30

## 2023-12-29 RX ADMIN — OLANZAPINE 2.5 MG: 10 INJECTION, POWDER, LYOPHILIZED, FOR SOLUTION INTRAMUSCULAR at 22:08

## 2023-12-29 RX ADMIN — EPHEDRINE SULFATE 10 MG: 50 INJECTION, SOLUTION INTRAVENOUS at 08:06

## 2023-12-29 RX ADMIN — BUSPIRONE HYDROCHLORIDE 10 MG: 10 TABLET ORAL at 20:43

## 2023-12-29 RX ADMIN — FENTANYL CITRATE 50 MCG: 50 INJECTION, SOLUTION INTRAMUSCULAR; INTRAVENOUS at 09:08

## 2023-12-29 RX ADMIN — NORETHINDRONE AND ETHINYL ESTRADIOL 25 MG: KIT ORAL at 08:26

## 2023-12-29 RX ADMIN — Medication 5 MG: at 20:43

## 2023-12-29 RX ADMIN — PROPOFOL 50 MG: 10 INJECTION, EMULSION INTRAVENOUS at 07:27

## 2023-12-29 RX ADMIN — CEFAZOLIN 2 G: 330 INJECTION, POWDER, FOR SOLUTION INTRAMUSCULAR; INTRAVENOUS at 07:42

## 2023-12-29 RX ADMIN — FENTANYL CITRATE 50 MCG: 50 INJECTION, SOLUTION INTRAMUSCULAR; INTRAVENOUS at 07:56

## 2023-12-29 RX ADMIN — ROCURONIUM BROMIDE 60 MG: 10 INJECTION INTRAVENOUS at 07:33

## 2023-12-29 RX ADMIN — PROPOFOL 25 MG: 10 INJECTION, EMULSION INTRAVENOUS at 10:01

## 2023-12-29 RX ADMIN — Medication 120 MCG: at 08:09

## 2023-12-29 RX ADMIN — PROPOFOL 100 MG: 10 INJECTION, EMULSION INTRAVENOUS at 07:33

## 2023-12-29 RX ADMIN — Medication 120 MCG: at 08:03

## 2023-12-29 RX ADMIN — ONDANSETRON 4 MG: 2 INJECTION INTRAMUSCULAR; INTRAVENOUS at 09:35

## 2023-12-29 RX ADMIN — SODIUM CHLORIDE, SODIUM LACTATE, POTASSIUM CHLORIDE, AND CALCIUM CHLORIDE: 600; 310; 30; 20 INJECTION, SOLUTION INTRAVENOUS at 07:25

## 2023-12-29 RX ADMIN — MAGNESIUM SULFATE HEPTAHYDRATE 2 G: 500 INJECTION, SOLUTION INTRAMUSCULAR; INTRAVENOUS at 07:59

## 2023-12-29 RX ADMIN — LIDOCAINE HYDROCHLORIDE 60 MG: 20 INJECTION INTRAVENOUS at 07:33

## 2023-12-29 RX ADMIN — ENOXAPARIN SODIUM 40 MG: 100 INJECTION SUBCUTANEOUS at 20:43

## 2023-12-29 RX ADMIN — Medication 120 MCG: at 08:29

## 2023-12-29 RX ADMIN — LIDOCAINE HYDROCHLORIDE 40 MG: 20 INJECTION INTRAVENOUS at 07:28

## 2023-12-29 RX ADMIN — BUSPIRONE HYDROCHLORIDE 10 MG: 10 TABLET ORAL at 15:57

## 2023-12-29 RX ADMIN — EPHEDRINE SULFATE 15 MG: 50 INJECTION, SOLUTION INTRAVENOUS at 08:12

## 2023-12-29 SDOH — SOCIAL STABILITY: SOCIAL INSECURITY: ABUSE: ADULT

## 2023-12-29 SDOH — SOCIAL STABILITY: SOCIAL INSECURITY: ARE YOU OR HAVE YOU BEEN THREATENED OR ABUSED PHYSICALLY, EMOTIONALLY, OR SEXUALLY BY ANYONE?: NO

## 2023-12-29 SDOH — SOCIAL STABILITY: SOCIAL INSECURITY: HAVE YOU HAD THOUGHTS OF HARMING ANYONE ELSE?: NO

## 2023-12-29 SDOH — SOCIAL STABILITY: SOCIAL INSECURITY: ARE THERE ANY APPARENT SIGNS OF INJURIES/BEHAVIORS THAT COULD BE RELATED TO ABUSE/NEGLECT?: NO

## 2023-12-29 SDOH — SOCIAL STABILITY: SOCIAL INSECURITY: DOES ANYONE TRY TO KEEP YOU FROM HAVING/CONTACTING OTHER FRIENDS OR DOING THINGS OUTSIDE YOUR HOME?: NO

## 2023-12-29 SDOH — SOCIAL STABILITY: SOCIAL INSECURITY: DO YOU FEEL ANYONE HAS EXPLOITED OR TAKEN ADVANTAGE OF YOU FINANCIALLY OR OF YOUR PERSONAL PROPERTY?: NO

## 2023-12-29 SDOH — SOCIAL STABILITY: SOCIAL INSECURITY: HAS ANYONE EVER THREATENED TO HURT YOUR FAMILY OR YOUR PETS?: NO

## 2023-12-29 SDOH — SOCIAL STABILITY: SOCIAL INSECURITY: DO YOU FEEL UNSAFE GOING BACK TO THE PLACE WHERE YOU ARE LIVING?: NO

## 2023-12-29 ASSESSMENT — ACTIVITIES OF DAILY LIVING (ADL)
HEARING - RIGHT EAR: DIFFICULTY WITH NOISE
BATHING: NEEDS ASSISTANCE
JUDGMENT_ADEQUATE_SAFELY_COMPLETE_DAILY_ACTIVITIES: NO
DRESSING YOURSELF: NEEDS ASSISTANCE
GROOMING: NEEDS ASSISTANCE
TOILETING: NEEDS ASSISTANCE
WALKS IN HOME: NEEDS ASSISTANCE
ADEQUATE_TO_COMPLETE_ADL: NO
WALKS IN HOME: NEEDS ASSISTANCE
ASSISTIVE_DEVICE: WHEELCHAIR;WALKER
FEEDING YOURSELF: NEEDS ASSISTANCE
LACK_OF_TRANSPORTATION: NO
FEEDING YOURSELF: NEEDS ASSISTANCE
PATIENT'S MEMORY ADEQUATE TO SAFELY COMPLETE DAILY ACTIVITIES?: NO
ADEQUATE_TO_COMPLETE_ADL: NO
HEARING - RIGHT EAR: DIFFICULTY WITH NOISE
DRESSING YOURSELF: NEEDS ASSISTANCE
GROOMING: NEEDS ASSISTANCE
JUDGMENT_ADEQUATE_SAFELY_COMPLETE_DAILY_ACTIVITIES: NO
ASSISTIVE_DEVICE: WHEELCHAIR;WALKER
HEARING - LEFT EAR: DIFFICULTY WITH NOISE
HEARING - LEFT EAR: DIFFICULTY WITH NOISE
TOILETING: NEEDS ASSISTANCE
BATHING: NEEDS ASSISTANCE

## 2023-12-29 ASSESSMENT — COGNITIVE AND FUNCTIONAL STATUS - GENERAL
HELP NEEDED FOR BATHING: A LOT
DRESSING REGULAR LOWER BODY CLOTHING: A LOT
MOVING TO AND FROM BED TO CHAIR: A LOT
PERSONAL GROOMING: A LITTLE
MOVING FROM LYING ON BACK TO SITTING ON SIDE OF FLAT BED WITH BEDRAILS: TOTAL
DAILY ACTIVITIY SCORE: 19
TURNING FROM BACK TO SIDE WHILE IN FLAT BAD: TOTAL
MOBILITY SCORE: 9
WALKING IN HOSPITAL ROOM: TOTAL
TOILETING: TOTAL
DRESSING REGULAR UPPER BODY CLOTHING: A LOT
DAILY ACTIVITIY SCORE: 11
TURNING FROM BACK TO SIDE WHILE IN FLAT BAD: A LOT
STANDING UP FROM CHAIR USING ARMS: TOTAL
TOILETING: TOTAL
WALKING IN HOSPITAL ROOM: TOTAL
STANDING UP FROM CHAIR USING ARMS: A LOT
MOBILITY SCORE: 10
PERSONAL GROOMING: A LOT
STANDING UP FROM CHAIR USING ARMS: TOTAL
CLIMB 3 TO 5 STEPS WITH RAILING: TOTAL
EATING MEALS: A LITTLE
PATIENT BASELINE BEDBOUND: NO
TOILETING: A LITTLE
MOBILITY SCORE: 6
MOVING TO AND FROM BED TO CHAIR: TOTAL
CLIMB 3 TO 5 STEPS WITH RAILING: TOTAL
HELP NEEDED FOR BATHING: A LITTLE
TURNING FROM BACK TO SIDE WHILE IN FLAT BAD: A LOT
MOVING FROM LYING ON BACK TO SITTING ON SIDE OF FLAT BED WITH BEDRAILS: A LOT
MOVING FROM LYING ON BACK TO SITTING ON SIDE OF FLAT BED WITH BEDRAILS: A LITTLE
DRESSING REGULAR UPPER BODY CLOTHING: A LITTLE
PERSONAL GROOMING: A LITTLE
WALKING IN HOSPITAL ROOM: TOTAL
DRESSING REGULAR UPPER BODY CLOTHING: A LITTLE
CLIMB 3 TO 5 STEPS WITH RAILING: TOTAL
DAILY ACTIVITIY SCORE: 14
DRESSING REGULAR LOWER BODY CLOTHING: A LOT
MOVING TO AND FROM BED TO CHAIR: TOTAL
DRESSING REGULAR LOWER BODY CLOTHING: A LITTLE
HELP NEEDED FOR BATHING: TOTAL
EATING MEALS: A LITTLE

## 2023-12-29 ASSESSMENT — LIFESTYLE VARIABLES
SUBSTANCE_ABUSE_PAST_12_MONTHS: NO
HOW OFTEN DO YOU HAVE A DRINK CONTAINING ALCOHOL: NEVER
AUDIT-C TOTAL SCORE: 0
SKIP TO QUESTIONS 9-10: 1
AUDIT-C TOTAL SCORE: 0
HOW OFTEN DO YOU HAVE 6 OR MORE DRINKS ON ONE OCCASION: NEVER
HOW MANY STANDARD DRINKS CONTAINING ALCOHOL DO YOU HAVE ON A TYPICAL DAY: PATIENT DOES NOT DRINK
PRESCIPTION_ABUSE_PAST_12_MONTHS: NO

## 2023-12-29 ASSESSMENT — PAIN - FUNCTIONAL ASSESSMENT

## 2023-12-29 ASSESSMENT — PAIN SCALES - GENERAL
PAINLEVEL_OUTOF10: 0 - NO PAIN
PAINLEVEL_OUTOF10: 2
PAINLEVEL_OUTOF10: 0 - NO PAIN
PAINLEVEL_OUTOF10: 2
PAINLEVEL_OUTOF10: 0 - NO PAIN

## 2023-12-29 ASSESSMENT — PATIENT HEALTH QUESTIONNAIRE - PHQ9
1. LITTLE INTEREST OR PLEASURE IN DOING THINGS: SEVERAL DAYS
2. FEELING DOWN, DEPRESSED OR HOPELESS: SEVERAL DAYS
SUM OF ALL RESPONSES TO PHQ9 QUESTIONS 1 & 2: 2

## 2023-12-29 ASSESSMENT — ENCOUNTER SYMPTOMS
HEADACHES: 0
FATIGUE: 0
COLOR CHANGE: 0
ABDOMINAL PAIN: 0
FEVER: 0
COUGH: 0
AGITATION: 0
DIZZINESS: 0

## 2023-12-29 NOTE — H&P
History Of Present Illness  Adriana Duran is a 78 y.o. female presenting as transfer from OSH f or definitive treatment by ortho for left periprosthetic fracture.     CARLOS Duran is a 77 yo F with a PMHx of HTN, DM 2, HLD, anxiety and depression, previous L TKA and ORIF to left hip, OA of right knee,who presents to the ED as a transfer from OSH for definitive treatment of a displaced left femoral diaphysis fracture.    Per patient she was attempting to take something off of her Jerry tree in the living room and was pivoting on her left leg  as she turned around when she felt a severe pain and fell to the floor.  She denies any trauma to the head or LOC.  She states that she was trying to get her 's attention so she applied weight on her legs to limp to the window.  She was not able to flag him down so she laid down.  She states that her  came back in 15 minutes and found her on the living room floor and called EMS.    The patient denies any weakness, changes in the skin color, or paresthesias.  She does state that her left foot feels a little bit cold.  She states that the pain was at a 10/10 at the site of the fracture in the middle of her left thigh.  She states with pain medication the pain goes down to 5-6/10. She denies SOB, cp, or dizziness.     ED Course (OSH):   T: 36.8  BP: 151/99 HR: 70  RR: 18   CBC with no leukocytosis and stable H/H. GFR with no electrolyte derangements and stable renal function.   Pt given 500 ml bolus of NS. S/p dilaudid 0.5 x 2, morphine 4 mg x 1, and Toradol 15 mg x 1.   EKG: Sinus rhythm with sinus arrhythmia 77 bpm, left axis deviation, other than MS normal intervals, somewhat poor quality baseline no clear acute ST or T wave abnormalities     ED Course ([facility]):   T: 36.8  BP: 138.89  HR: 69  RR: 18   CXR: no cardiopulmonary findings.   CMP with slightly elevated Cr to 1.13 from 1.10.   Coags WNL   CT left femur: oblique medially displaced fracture of  the distal LEFT femoral diaphysis. There is approximate 4 cm of foreshortening.      Past Medical History  Past Medical History:   Diagnosis Date    Arthritis     Chronic kidney disease     Diabetes mellitus (CMS/HCC)     Foot pain, left     bunion turned to wound    Right knee pain     UTI (urinary tract infection)        Surgical History  Past Surgical History:   Procedure Laterality Date    HIP FRACTURE SURGERY Left     KNEE SURGERY Left     replacement    KNEE SURGERY Right     arthritis removed    SHOULDER SURGERY Left     rotator cuff        Social History  She reports that she has quit smoking. Her smoking use included cigarettes. She has never used smokeless tobacco. She reports that she does not drink alcohol and does not use drugs.    Family History  Family History   Problem Relation Name Age of Onset    Heart disease Father      Cancer Father      Diabetes Paternal Grandfather          Allergies  Strawberry, Ibuprofen, and Metformin    Review of Systems   Constitutional:  Negative for fatigue and fever.   Respiratory:  Negative for cough.    Cardiovascular:  Negative for chest pain and leg swelling.   Gastrointestinal:  Negative for abdominal pain.   Skin:  Negative for color change.   Neurological:  Negative for dizziness and headaches.   Psychiatric/Behavioral:  Negative for agitation.         Physical Exam  Constitutional:       General: She is not in acute distress.  HENT:      Head: Normocephalic and atraumatic.   Cardiovascular:      Rate and Rhythm: Normal rate. Rhythm irregular.      Pulses: Normal pulses.      Heart sounds: Murmur heard.      Comments: Systolic murmur at precordium  Pulmonary:      Effort: Pulmonary effort is normal.      Breath sounds: Normal breath sounds. No wheezing or rhonchi.   Abdominal:      General: Abdomen is flat.      Palpations: Abdomen is soft.   Musculoskeletal:         General: No swelling.      Comments: Long knee immobilizing cast in place. Pt able to move her  toes. Left ROM exam limited by pain. DP and PT pulses palpable. No skin changes noted.    Skin:     General: Skin is warm and dry.      Capillary Refill: Capillary refill takes less than 2 seconds.      Coloration: Skin is pale.   Neurological:      General: No focal deficit present.      Mental Status: She is alert. She is disoriented.      Motor: No weakness.      Comments: Pt is AAO x 2 to person and place.           Last Recorded Vitals  Blood pressure 125/63, pulse 89, temperature 36.8 °C (98.3 °F), resp. rate 18, SpO2 93 %.    Relevant Results  Results for orders placed or performed during the hospital encounter of 12/28/23 (from the past 24 hour(s))   Type And Screen   Result Value Ref Range    ABO TYPE O     Rh TYPE POS     ANTIBODY SCREEN NEG    CBC and Auto Differential   Result Value Ref Range    WBC 9.6 4.4 - 11.3 x10*3/uL    nRBC 0.0 0.0 - 0.0 /100 WBCs    RBC 3.23 (L) 4.00 - 5.20 x10*6/uL    Hemoglobin 9.8 (L) 12.0 - 16.0 g/dL    Hematocrit 29.4 (L) 36.0 - 46.0 %    MCV 91 80 - 100 fL    MCH 30.3 26.0 - 34.0 pg    MCHC 33.3 32.0 - 36.0 g/dL    RDW 14.2 11.5 - 14.5 %    Platelets 112 (L) 150 - 450 x10*3/uL    Neutrophils % 83.9 40.0 - 80.0 %    Immature Granulocytes %, Automated 0.2 0.0 - 0.9 %    Lymphocytes % 10.1 13.0 - 44.0 %    Monocytes % 5.3 2.0 - 10.0 %    Eosinophils % 0.2 0.0 - 6.0 %    Basophils % 0.3 0.0 - 2.0 %    Neutrophils Absolute 8.01 (H) 1.60 - 5.50 x10*3/uL    Immature Granulocytes Absolute, Automated 0.02 0.00 - 0.50 x10*3/uL    Lymphocytes Absolute 0.97 0.80 - 3.00 x10*3/uL    Monocytes Absolute 0.51 0.05 - 0.80 x10*3/uL    Eosinophils Absolute 0.02 0.00 - 0.40 x10*3/uL    Basophils Absolute 0.03 0.00 - 0.10 x10*3/uL   Comprehensive metabolic panel   Result Value Ref Range    Glucose 176 (H) 74 - 99 mg/dL    Sodium 141 136 - 145 mmol/L    Potassium 4.6 3.5 - 5.3 mmol/L    Chloride 109 (H) 98 - 107 mmol/L    Bicarbonate 23 21 - 32 mmol/L    Anion Gap 14 10 - 20 mmol/L    Urea  Nitrogen 26 (H) 6 - 23 mg/dL    Creatinine 1.13 (H) 0.50 - 1.05 mg/dL    eGFR 50 (L) >60 mL/min/1.73m*2    Calcium 9.1 8.6 - 10.6 mg/dL    Albumin 3.6 3.4 - 5.0 g/dL    Alkaline Phosphatase 68 33 - 136 U/L    Total Protein 6.4 6.4 - 8.2 g/dL    AST 26 9 - 39 U/L    Bilirubin, Total 0.5 0.0 - 1.2 mg/dL    ALT 16 7 - 45 U/L   Coagulation Screen   Result Value Ref Range    Protime 10.9 9.8 - 12.8 seconds    INR 1.0 0.9 - 1.1    aPTT 28 27 - 38 seconds          Assessment/Plan   Principal Problem:    Sony-prosthetic supracondylar fracture of femur  Active Problems:    Periprosthetic supracondylar fracture of femur, initial encounter    CARLOS Duran is a 79 yo F with a PMHx of HTN, DM 2 ( not on meds), HLD, anxiety and depression, previous L TKA and ORIF to left hip who presents to the ED as a transfer from OSH for definitive treatment of a displaced left femoral diaphysis fracture. Pt with stable HR and BP but requiring supplemental O2 2/2 desat to 88-90%. Ortho following for possible surgical intervention in the AM. Pt is admitted to the  IPS for sony-op management as well as assessment of optimization for OR.     # Periprosthetic left femur diaphysis fracture   :: mechanical fall from standing   :: CT left femur:  oblique medially displaced fracture of the distal LEFT femoral diaphysis. There is approximate 4 cm of foreshortening.   :: long knee immobilizer in place    - Pain mgmt     Tylenol 965 mg q6h PRN for mild pain     Oxy 5/10 PRN for moderate/ severe pain     IV dilaudid 0.2 mg for breakthrough pain   - RCRI Class 1 Risk   - NSQIP Risk of any complication: 3.5% ( below average)   - NSQIP Risk of serious complication: 3.5% ( below average)     # HTN   - c/w home losartan 100 mg every day     # Hx of DMII  :: HbA1c of 5.7 on 2/2023  :: not on DM meds     # HLD   - c/w home atorvastatin 20 mg     # MDD  # anxiety   - c/w home meds: citalopram 40 mg, cymbalta 60 m,g and buspar 10 mg TID    F: D5NS @130   E: PRN    N;NPO   Gi: not indicated     DVT ppx: Held for possible sx     Staffed with Dr. Bossman Soriano MD, MPH   Family Medicine and Preventive Health, PGY-2

## 2023-12-29 NOTE — ED TRIAGE NOTES
Pt states she had a mechanical fall from standing, twisted her ankle and fell onto her L knee, + deformity, L femur fx from OSH, - thinners

## 2023-12-29 NOTE — CARE PLAN
The patient's goals for the shift include      The clinical goals for the shift include Patient will maintain safety and comfort during shift

## 2023-12-29 NOTE — OP NOTE
ORTHOPAEDIC SURGERY OPERATIVE REPORT      Date of Surgery: 12/29/2023    Surgeon: Russell Torres MD    Assistant Surgeon: MD Dr. Marlon Napier participated in this case as the assistant surgeon, performing components of the positioning, approach, debridement, reduction, fixation, and closure. Due to the nature and complexity of the case, no qualified resident of an appropriate level was available to assist.    Assistants:  Anai Angel DPM    Preoperative Diagnosis:  Left interprosthetic femoral shaft fracture    Postoperative Diagnosis:  Left interprosthetic femoral shaft fracture    Procedures Performed:  Left femoral shaft fracture percutaneous assisted reduction and retrograde intramedullary nailing, with 22 modifier due to the periprosthetic and interprosthetic nature of the fracture, leading to increased complexity, modification of implant choice, and increased operative time  Left femoral shaft prophylactic plating  Application and later removal of left proximal tibia skeletal traction    Anesthesia: General anesthesia    IV Fluids: Per anesthesia record    Estimated Blood Loss:  150 mL    Complications: None    Implants:   Agenda T2 Alpha 11 x 260 mm retrograde femoral nail with associated standard and advanced interlock screws  Synthes 3.5 mm 7 hole LCP plate with associated cortical screws    Specimens: None    Intraoperative Findings: Stable fracture fixation and safe extra-articular hardware placement noted at the conclusion of the case.    Indications For Procedure:  Adriana Duran is a 78 y.o. female with a history of a left total knee arthroplasty as well as a remote history of a left hip fracture which underwent fixation with a DHS system.  The patient sustained a ground-level fall onto her left side and had immediate pain and deformity to her left leg.  Upon presentation, imaging obtained at that time demonstrated a left intra prosthetic femoral shaft fracture. Due to the  nature of the patient's injury, they were indicated for operative stabilization.  Informed consent was obtained after discussing the risks, benefits, and alternatives to the procedure.  Risks include pain, bleeding, infection, damage to nearby structures, malunion, nonunion, hardware complications, need for further procedures, blood clots, anesthesia risks, and death.  Decision was made to proceed.  The patient was then brought to the preoperative holding area on the day of surgery.    Procedure Detail:  The patient was met in the preoperative holding area where their identity was confirmed and the operative extremity was marked. The patient was taken back to the operating theater where a preinduction timeout was performed which confirmed the patient's identity, procedure to be performed, correct operative site, availability of imaging and implants, allergies, and preoperative antibiotics. Everyone present was in agreement. They were placed under general anesthesia without complication. The patient was transferred to the operating table and placed in the supine position. All bony prominences were well-padded. The patient's left lower extremity was then prepped and draped in the usual sterile fashion. A preprocedure timeout was performed which again confirmed the patient's identity, procedure to be performed, and correct operative site.  Everyone present was in agreement. Preoperative antibiotics were administered.    We began by first utilizing fluoroscopy to identify an appropriate position for our planned proximal tibial traction.  After appropriate location was confirmed, a 2.0 mm Steinmann pin was inserted from lateral to medial.  A rope was attached to this and weights were hung off of the end of the bed.    We then turned our attention to intramedullary nailing of the distal femoral shaft fracture.  We marked out the tibial tubercle as well as the borders of the patella.  We made a longitudinal incision  between the inferior border of the patella and the tibial tubercle.  Incision was made through skin and subcutaneous tissue.  The patellar tendon was sharply incised at the midline.  Curved Scruggs scissors were inserted into the joint and used to remove adhesions.  We inserted our starting guidewire and placed this in appropriate position. Guidepin was advanced.  Opening reamer was used over top of this.  We advanced a ball-tipped guidewire up to the level of the previously placed DHS screws.  At this time, fluoroscopy demonstrated that the 2 most distal holes of the plate were actually broken.  This was not readily apparent on preoperative x-rays and CT scan.  The guidewire measured 240 mm and we elected to use a 240 mm nail.  At this time, we used fluoroscopy to identify an appropriate position for percutaneous clamp placement.  Once this was identified we made a stab incision and then inserted a collinear clamp around the main oblique segment of the fracture.  The collinear clamp was tensioned down and reduced the shaft satisfactorily.  There was still some additional apex anterior deformity which was correctable with the use of a mallet.  We sequentially reamed up to a 12 mm reamer and elected to use a 11 mm nail.  We placed our Harvey T2 Alpha 11 x 240 mm retrograde femoral nail over top the guidewire and fully seated this.  Reduction maintained satisfactory at this time.  However, we noted that the nail appeared relatively short in terms of reaching up to the level of the previous DHS plate and screws.  Therefore, we removed the nail and selected a new 11 x 260 mm nail and inserted this.  There was still some apex anterior deformity, so we backed the nail up and placed a blocking drill bit in the anterior aspect of the proximal segment, and then advanced the nail completely.  Nail was now noted to be in appropriate position and length.  Once the nail was seated we placed 5 distal interlock screws using the  aiming jig.  We used advanced screws for all of these.  We placed 2 proximal anterior to posterior interlock screws using the aiming arm. A setscrew was applied to the retrograde femoral nail and the insertion handle was removed.  Traction weights were removed at this time and the traction pin was removed by hand without difficulty.  At this time we were very happy with our fracture reduction and nail placement.    Given the potential stress riser between the DHS and retrograde nail, we elected to proceed with prophylactic plate fixation of the proximal femoral shaft.  We utilized the patient's prior direct lateral incision and made incision through skin and subcutaneous tissue.  Deeper dissection was taken down with Bovie electrocautery until the IT band was identified.  This was sharply incised and retracted.  We performed a vastus split and came down onto the femoral shaft.  We were able to palpate and visualize the retrograde femoral nail proximal interlock screws as well as the DHS plate.  We selected a Synthes 3.5 mm 7 hole LCP plate and positioned this in a fashion as to bridge the potential stress riser.  This was provisionally pinned.  Appropriate plate position was confirmed with fluoroscopy.  We then secured the plate with 2 screws on either side of the stress riser.  These were placed bicortically and had excellent purchase.    We obtained our final fluoroscopic images which demonstrated satisfactory fracture reduction as well as safe extra-articular hardware placement.    We copiously irrigated all incisions with normal saline.  The patellar tendon, vastus split, and IT band were closed with a running 0 PDS suture.  The deep dermal layer and remaining incisions were closed in a layered fashion.  Sterile dressings were applied.  All counts were correct x2 at this time.    The drapes were taken down and the patient was awoken from anesthesia without complication. They were transferred back to their  hospital bed and taken to PACU in stable condition.    Postoperative Plan:  The patient will be weightbearing as tolerated to the left lower extremity.  She will receive postoperative antibiotics.  Patient may resume DVT prophylaxis per the primary team, we recommend at least aspirin 81 mg twice daily.  Patient will receive evaluations by PT and OT. The patient will follow up with Dr. Russell Torres MD in approximately 3 weeks time for clinical check, suture removal, and 2 view xrays of the left femur (AP/lateral) at that time.      Dr. Russell Torres MD was present for the entirety of the case.      Sami Mason MD  Orthopaedic Trauma Fellow  12/29/2023

## 2023-12-29 NOTE — CARE PLAN
The patient's goals for the shift include  less pain    The clinical goals for the shift include Patient will maintain safety and comfort during shift    Over the shift, the patient made progress towards all goals.

## 2023-12-29 NOTE — ED PROVIDER NOTES
CC: Fall     HPI:  Patient is a 75-year-old female with past medical history as noted below who is presenting to the emergency department with a chief concern of mechanical fall.  After sustaining a mechanical fall patient had severe pain in her left lower extremity.  She presented to the outside hospital and her femur fracture was appreciated.  Given this fracture the patient is transported to [facility] for evaluation by orthopedics in stable condition.    On evaluation the patient is reporting no new numbness or weakness in the left distal extremity, is confirming that this was a mechanical fall in nature and is denying any medical prodrome prior to the fall.    Analgesia is offered and patient declines.    Records Reviewed:  Recent available ED and inpatient notes reviewed in EMR.    PMHx/PSHx:  Per HPI.   - has a past medical history of Arthritis, Chronic kidney disease, Diabetes mellitus (CMS/[facility]), Foot pain, left, Right knee pain, and UTI (urinary tract infection).  - has a past surgical history that includes Hip fracture surgery (Left); Knee surgery (Left); Knee surgery (Right); and Shoulder surgery (Left).    Medications:  Reviewed in EMR. See EMR for complete list of medications and doses.    Allergies:  Strawberry, Ibuprofen, and Metformin    Social History:  - Tobacco:  reports that she has quit smoking. Her smoking use included cigarettes. She has never used smokeless tobacco.   - Alcohol:  reports no history of alcohol use.   - Illicit Drugs:  reports no history of drug use.     ROS:  Per HPI.       ???????????????????????????????????????????????????????????????  Triage Vitals:  T 36.8 °C (98.3 °F)  HR 69  /89  RR 18  O2 96 %      Physical Exam  Vitals and nursing note reviewed.   Constitutional:       General: She is not in acute distress.     Appearance: Normal appearance. She is not toxic-appearing.   HENT:      Head: Normocephalic and atraumatic.      Nose: No congestion or  rhinorrhea.      Mouth/Throat:      Mouth: Mucous membranes are moist.      Pharynx: Oropharynx is clear.   Eyes:      Extraocular Movements: Extraocular movements intact.      Conjunctiva/sclera: Conjunctivae normal.      Pupils: Pupils are equal, round, and reactive to light.   Cardiovascular:      Rate and Rhythm: Normal rate and regular rhythm.      Pulses: Normal pulses.      Heart sounds: No murmur heard.     No friction rub. No gallop.   Pulmonary:      Effort: Pulmonary effort is normal.      Breath sounds: No wheezing, rhonchi or rales.   Abdominal:      General: There is no distension.      Palpations: Abdomen is soft.      Tenderness: There is no abdominal tenderness. There is no guarding or rebound.   Musculoskeletal:         General: No swelling, deformity or signs of injury. Normal range of motion.      Cervical back: Normal range of motion.      Right lower leg: No edema.      Left lower leg: No edema.      Comments: There is significant deformity to the left lower extremity with severe swelling proximal to the knee.  Patient has chronic forming to the left ankle and foot.  Patient has intact peripheral pulses, intact distal sensation, intact distal strength in this area.  Has tenderness to palpation of the area of deformity.     Skin:     General: Skin is warm and dry.      Capillary Refill: Capillary refill takes less than 2 seconds.      Findings: No bruising, lesion or rash.   Neurological:      General: No focal deficit present.      Mental Status: She is alert and oriented to person, place, and time. Mental status is at baseline.      Cranial Nerves: No cranial nerve deficit.      Sensory: No sensory deficit.      Coordination: Coordination normal.   Psychiatric:         Mood and Affect: Mood normal.         Thought Content: Thought content normal.         Judgment: Judgment normal.       ??????????????????????????????????????????????????????????????    Assessment and Plan:  Patient is a  78-year-old female with past medical history as noted above who is presenting emergency department chief concern of injury to the left lower extremity from a mechanical ground-level fall.  Patient declines analgesia on my initial evaluation, is neurovascularly intact distal to the injury.  Orthopedic surgery is consulted for evaluation the patient.  Preoperative labs were ordered.  Pending final recommendations from consultant patient was signed out to the [facility] emergency medicine provider.  Patient will likely require admission for operative management of left-sided femur fracture.  Patient is neurovascularly intact in the distal extremity, hemodynamically stable under my care and is signed out in stable condition pending consultant recommendations.    ED Course:  ED Course as of 12/29/23 1642   Fri Dec 29, 2023   0148 XR tibia fibula left 2 views [LP]      ED Course User Index  [LP] Donna Garg DO         Diagnoses as of 12/29/23 1642   Closed fracture of left femur, unspecified fracture morphology, unspecified portion of femur, initial encounter (CMS/[facility])        Social Determinants Limiting Care:      Disposition:  Handoff    Juan Romeo MD       Procedures ? SmartLinks last updated 12/28/2023 10:39 PM        Juan Romeo MD  Resident  12/29/23 1644

## 2023-12-29 NOTE — CONSULTS
Orthopaedic Surgery Consult H&P      Requesting Provider / Service: ED    CC: Left femur fx    HPI: 78F (DM2, CKD, L TKA @ [facility] by Dr. Castellano 2018, L IT fx s/p DHS by Dr. Moya at [facility] 2018, healing L foot diabetic ulcer) transfer from [facility] presents after mGLF at home. Ambulates w/ walker at baseline. Denies new LLE N/T, though endorses some baseline BLLE neuropathy.     No history of VTE.  Former smoker.    PMH:  Past Medical History:   Diagnosis Date    Arthritis     Chronic kidney disease     Diabetes mellitus (CMS/HCC)     Foot pain, left     bunion turned to wound    Right knee pain     UTI (urinary tract infection)        Past Surgical History:   Procedure Laterality Date    HIP FRACTURE SURGERY Left     KNEE SURGERY Left     replacement    KNEE SURGERY Right     arthritis removed    SHOULDER SURGERY Left     rotator cuff       Social History     Socioeconomic History    Marital status:      Spouse name: Not on file    Number of children: Not on file    Years of education: Not on file    Highest education level: Not on file   Occupational History    Not on file   Tobacco Use    Smoking status: Former     Types: Cigarettes    Smokeless tobacco: Never   Vaping Use    Vaping Use: Never used   Substance and Sexual Activity    Alcohol use: Never    Drug use: Never    Sexual activity: Defer   Other Topics Concern    Not on file   Social History Narrative    Not on file     Social Determinants of Health     Financial Resource Strain: Not on file   Food Insecurity: Not on file   Transportation Needs: Not on file   Physical Activity: Not on file   Stress: Not on file   Social Connections: Not on file   Intimate Partner Violence: Not on file   Housing Stability: Not on file       Allergies   Allergen Reactions    [place] Rash    Ibuprofen Other     Kidney dx    Metformin Other     Cause kidney pain       No current facility-administered medications for this encounter.    Current Outpatient  Medications:     acetaminophen (Tylenol) 325 mg tablet, Take 2 tablets (650 mg) by mouth every 4 hours if needed (MILD PAIN AND/OR FEVER >38.5)., Disp: , Rfl:     atorvastatin (Lipitor) 20 mg tablet, Take 1 tablet (20 mg) by mouth once daily., Disp: 90 tablet, Rfl: 3    busPIRone (Buspar) 10 mg tablet, Take 1 tablet (10 mg) by mouth 3 times a day., Disp: 270 tablet, Rfl: 3    citalopram (CeleXA) 40 mg tablet, Take 1 tablet (40 mg) by mouth once daily., Disp: , Rfl:     cyanocobalamin (Vitamin B-12) 1,000 mcg tablet, Take 100 mcg by mouth once daily., Disp: , Rfl:     docusate sodium (Colace) 100 mg capsule, Take 1 capsule (100 mg) by mouth 2 times a day as needed for constipation., Disp: , Rfl:     DULoxetine (Cymbalta) 60 mg DR capsule, Take 1 capsule (60 mg) by mouth once daily. Do not crush or chew., Disp: , Rfl:     losartan (Cozaar) 100 mg tablet, Take 1 tablet (100 mg) by mouth once daily., Disp: 90 tablet, Rfl: 3    multivitamin tablet, Take 1 tablet by mouth once daily., Disp: , Rfl:     sodium bicarbonate 650 mg tablet, Take 1 tablet (650 mg) by mouth 3 times a day., Disp: , Rfl:     Family History: non-contributory      Review of Systems:   ROS  No pertinent symptoms related to systems other than those stated above      O:  @24HRVITALS@  No intake or output data in the 24 hours ending 12/29/23 0150    Physical Exam:   /63   Pulse 89   Temp 36.8 °C (98.3 °F)   Resp 18   SpO2 93%     Constitutional: NAD  HEENT: hearing and vision grossly intact, MMM  Resp: breathing comfortably on RA  CV: extremities warm, well perfused  Neuro: alert, sensory and motor grossly intact  Psych: Appropriate mood and affect    MSK:  Left Lower Extremity:   -Skin intact, obvious deformity to distal femur  -Leg shortened  -Fires DF/PF/EHL/FHL  -SILT in saph/sural/SPN/DPN distributions  -Foot warm, well perfused  -Palpable DP pulse, brisk cap refill  -Compartments soft and compressible    Relevant Results  Results for  orders placed or performed during the hospital encounter of 12/28/23 (from the past 24 hour(s))   CBC and Auto Differential   Result Value Ref Range    WBC 9.6 4.4 - 11.3 x10*3/uL    nRBC 0.0 0.0 - 0.0 /100 WBCs    RBC 3.23 (L) 4.00 - 5.20 x10*6/uL    Hemoglobin 9.8 (L) 12.0 - 16.0 g/dL    Hematocrit 29.4 (L) 36.0 - 46.0 %    MCV 91 80 - 100 fL    MCH 30.3 26.0 - 34.0 pg    MCHC 33.3 32.0 - 36.0 g/dL    RDW 14.2 11.5 - 14.5 %    Platelets 112 (L) 150 - 450 x10*3/uL    Neutrophils % 83.9 40.0 - 80.0 %    Immature Granulocytes %, Automated 0.2 0.0 - 0.9 %    Lymphocytes % 10.1 13.0 - 44.0 %    Monocytes % 5.3 2.0 - 10.0 %    Eosinophils % 0.2 0.0 - 6.0 %    Basophils % 0.3 0.0 - 2.0 %    Neutrophils Absolute 8.01 (H) 1.60 - 5.50 x10*3/uL    Immature Granulocytes Absolute, Automated 0.02 0.00 - 0.50 x10*3/uL    Lymphocytes Absolute 0.97 0.80 - 3.00 x10*3/uL    Monocytes Absolute 0.51 0.05 - 0.80 x10*3/uL    Eosinophils Absolute 0.02 0.00 - 0.40 x10*3/uL    Basophils Absolute 0.03 0.00 - 0.10 x10*3/uL   Comprehensive metabolic panel   Result Value Ref Range    Glucose 176 (H) 74 - 99 mg/dL    Sodium 141 136 - 145 mmol/L    Potassium 4.6 3.5 - 5.3 mmol/L    Chloride 109 (H) 98 - 107 mmol/L    Bicarbonate 23 21 - 32 mmol/L    Anion Gap 14 10 - 20 mmol/L    Urea Nitrogen 26 (H) 6 - 23 mg/dL    Creatinine 1.13 (H) 0.50 - 1.05 mg/dL    eGFR 50 (L) >60 mL/min/1.73m*2    Calcium 9.1 8.6 - 10.6 mg/dL    Albumin 3.6 3.4 - 5.0 g/dL    Alkaline Phosphatase 68 33 - 136 U/L    Total Protein 6.4 6.4 - 8.2 g/dL    AST 26 9 - 39 U/L    Bilirubin, Total 0.5 0.0 - 1.2 mg/dL    ALT 16 7 - 45 U/L   Coagulation Screen   Result Value Ref Range    Protime 10.9 9.8 - 12.8 seconds    INR 1.0 0.9 - 1.1    aPTT 28 27 - 38 seconds       CT femur left wo IV contrast    Result Date: 12/29/2023  STUDY: CT Left Lower Extremity; Completed Time:  12/29/2023 at 12:51 AM INDICATION: Fracture. COMPARISON: XR tibia/fibula 12/28/2023, XR hip 12/28/2023, XR  knee 12/28/2023, XR femur 12/28/2023, XR knee 8/26/2021. ACCESSION NUMBER(S): [id number] ORDERING CLINICIAN: RANDEE THOMPSON TECHNIQUE:  Thin section axial images were obtained through the left lower extremity without intravenous contrast.  Orthogonal reconstructed images were obtained and reviewed.  Automated mA/kV exposure control was utilized and patient examination was performed in strict accordance with principles of ALARA. FINDINGS:  There is an oblique medially displaced fracture of the distal LEFT femoral diaphysis. There is approximate 4 cm of foreshortening. The fracture line extends to the distal femoral metaphysis. This does not appear to extend to the prostheses. Adjacent hemorrhage. No additional fracture identified. Postoperative changes prior intramedullary fixation of the LEFT femoral neck.    There is an oblique medially displaced fracture of the distal LEFT femoral diaphysis. There is approximate 4 cm of foreshortening. The fracture line extends to the distal femoral metaphysis. This does not appear to extend to the prostheses. Adjacent hemorrhage. No additional fracture identified. Postoperative changes prior intramedullary fixation of the LEFT femoral neck. Signed by Jordan Mtz MD    XR tibia fibula left 2 views    Result Date: 12/29/2023  STUDY: Tibia and Fibula Radiographs; 12/28/2023 11:55 PM INDICATION: Fracture. COMPARISON: None Available. ACCESSION NUMBER(S): [id number] ORDERING CLINICIAN: RANDEE THOMPSON TECHNIQUE:  Two view(s) (three images) of the left tibia and fibula. FINDINGS:  LEFT total knee replacement. Partially visualized distal LEFT femur fracture. No tibial or fibular fracture is identified. No findings of dislocation.    Impression: LEFT total knee replacement. Partially visualized distal LEFT femur fracture. No tibial or fibular fracture is identified. No findings of dislocation. Signed by Jordan Mtz MD    XR chest 1 view    Result Date:  12/28/2023  INDICATION: Preop. COMPARISON: 9/5/2023. TECHNIQUE: AP chest, 23:11 hours. FINDINGS: The heart and mediastinum are normal.  The lungs are without infiltrates, masses or pulmonary vascular congestion.  No pneumothorax or pleural effusion are seen. No acute osseous changes.    Normal AP chest. Signed by David Jolly MD    XR knee left 1-2 views    Result Date: 12/28/2023  Interpreted By:  Ayaka Waterman, STUDY: XR KNEE LEFT 1-2 VIEWS; ;  12/28/2023 4:48 pm   INDICATION: Signs/Symptoms:fall. Left knee pain   COMPARISON: 07/24/2018   ACCESSION NUMBER(S): [id number]   ORDERING CLINICIAN: RANDEE THOMPSON   FINDINGS: Two views show a slightly comminuted fracture of the distal left femur with fracture lines extending to the left femoral component of the knee joint prosthesis. Femoral fracture is displaced with apex anterior angulation.       Acute fracture of the mid to distal left femur with apex anterior angulation. Left knee joint replacement noted     MACRO: None   Signed by: Ayaka Waterman 12/28/2023 5:01 PM Dictation workstation:   [id number]    XR femur left 2+ views    Result Date: 12/28/2023  Interpreted By:  Ayaka Waterman, STUDY: XR FEMUR LEFT 2+ VIEWS; ;  12/28/2023 4:48 pm   INDICATION: Signs/Symptoms:fall. Left leg pain   COMPARISON: None.   ACCESSION NUMBER(S): [id number]   ORDERING CLINICIAN: RANDEE THOMPSON   FINDINGS: Two views show a displaced oblique fracture of the mid to distal femur. There is apex anterior angulation. The fracture lines extend to the knee joint prosthesis in the distal left femur. Dynamic compression screw is seen in the proximal left femur. Left knee joint replacement noted. Hardware appears intact.       Fracture of the mid to distal left femur extends to the knee joint prosthesis.     MACRO: None   Signed by: Ayaka Waterman 12/28/2023 4:59 PM Dictation workstation:   [id number]    XR hip left with pelvis when performed 2 or 3 views    Result Date:  12/28/2023  Interpreted By:  Ayaka Waterman, STUDY: XR HIP LEFT WITH PELVIS WHEN PERFORMED 2 OR 3 VIEWS; ;  12/28/2023 4:48 pm   INDICATION: Signs/Symptoms:fall. Left leg pain   COMPARISON: None.   ACCESSION NUMBER(S): [id number]   ORDERING CLINICIAN: RANDEE THOMPSON   FINDINGS: Two views show a dynamic compression screw in the left femur traversing a healed intertrochanteric fracture. Bones appear osteopenic. Degenerative changes are seen in the spine.       No acute bony abnormalities in the pelvis. Dynamic compression screw traverses a healed intertrochanteric fracture of the left femur     MACRO: None   Signed by: Ayaka Waterman 12/28/2023 4:58 PM Dictation workstation:   [id number]       Imaging:   X-ray and CT imaging of the left femur demonstrate a long spiral/acutely oblique inter prosthetic supracondylar distal femur fracture.  There is evidence of a proximal dynamic hip screw without complication, distally a posterior stabilized design total knee arthroplasty w/ open box.  Distal femur fracture appears to stop just short of the anterior flange of the femoral total knee component.  No other fractures or dislocations visualized.      A/P: 78F (DM2, CKD, L TKA @ [facility] by Dr. Castellano 2018, L IT fx s/p DHS by Dr. Moya at [facility] 2018, healing L foot diabetic ulcer) transfer from [facility] presents after mGLF at home. Ambulates w/ walker at baseline. Closed, NVI. Xray w/ above, CT w/ open box. Gentle traction, placed in well-padded KI.    - C/p ORIF L distal femur, possible nail plate combo w Dr. Torres 12/29  - Greatly appreciate documentation of clearance by primary team   - WB: NWB LLE in KI  - Abx: Pending OR  - Diet: NPO  - DVT: Per primary, okay to start preop  - Please obtain all preop labs (CBC,BMP,EKG, CXR, Coags, Type and Screen)  - Young: Please place    - Dispo: Pending OR today w/ Dr. Torres    Discussed with attending on call, Dr. Torres.    Please do not hesitate to page with additional  questions or concerns.    ------------------------------------------------------    Bravo Link MD  Orthopaedic Surgery, PGY-2  Available by Epic Chat  12/29/23  1:50 AM    Patient will be followed by the orthopedic trauma team:  Aaron Matthews      After 5 pm and on weekends, please page the on-call resident ([id number]) with questions or concerns.      12/29/23  This consult was seen and staffed within 30 minutes of the initial consult.

## 2023-12-29 NOTE — ANESTHESIA POSTPROCEDURE EVALUATION
[id number]: Adriana Duran    Procedure Summary       Date: 12/29/23 Room / Location: [facility] OR 01 / [facility] OR    Anesthesia Start: 0727 Anesthesia Stop: 1022    Procedures:       Open Reduction Internal Fixation Femur (Left: Thigh - Leg Upper)      Insertion Intramedullary Nail Femur (Left: Thigh - Leg Upper) Diagnosis:       Periprosthetic supracondylar fracture of femur, initial encounter      (Periprosthetic supracondylar fracture of femur, initial encounter [M97.8XXA, Z96.659])    Surgeons: Russell Torres MD Responsible Provider: Lilliana Zhang MD    Anesthesia Type: general ASA Status: 3            Anesthesia Type: general    Vitals Value Taken Time   /50 12/29/23 1020   Temp 36 °C (96.8 °F) 12/29/23 1020   Pulse 97 12/29/23 1028   Resp 15 12/29/23 1028   SpO2 94 % 12/29/23 1028   Vitals shown include unvalidated device data.    Anesthesia Post Evaluation    Patient location during evaluation: PACU  Patient participation: complete - patient participated  Level of consciousness: awake  Pain management: satisfactory to patient  Airway patency: patent  Cardiovascular status: acceptable  Respiratory status: acceptable  Hydration status: acceptable  Postoperative Nausea and Vomiting: none        There were no known notable events for this encounter.

## 2023-12-29 NOTE — ANESTHESIA PREPROCEDURE EVALUATION
"Patient: Adriana Duran    Procedure Information       Date/Time: 12/29/23 0715    Procedures:       Open Reduction Internal Fixation Femur (Left: Thigh - Leg Upper)      Insertion Intramedullary Nail Femur (Left: Thigh - Leg Upper)    Location: [facility] OR 01 / [facility] OR    Surgeons: Russell Torres MD          ALLERGIES:  Allergies   Allergen Reactions    [place] Rash    Ibuprofen Other     Kidney dx    Metformin Other     Cause kidney pain        MEDICAL HISTORY:  Past Medical History:   Diagnosis Date    Arthritis     Chronic kidney disease     Diabetes mellitus (CMS/HCC)     Foot pain, left     bunion turned to wound    Right knee pain     UTI (urinary tract infection)         Relevant Problems   Cardiovascular   (+) HTN (hypertension), benign   (+) Mixed hyperlipidemia      Endocrine   (+) Controlled type 2 diabetes mellitus with complication, without long-term current use of insulin (CMS/formerly [facility])   (+) Diabetic nephropathy with proteinuria (CMS/formerly [facility])      /Renal   (+) Diabetic nephropathy with proteinuria (CMS/formerly [facility])      Neuro/Psych   (+) Anxiety   (+) Recurrent major depressive disorder (CMS/formerly [facility])      Hematology   (+) Anemia   (+) Iron deficiency anemia      Musculoskeletal   (+) Osteoarthritis   (+) Osteoarthritis of knee   (+) Osteoarthritis of left knee   (+) Osteoarthritis of right knee      Infectious Disease   (+) Scabies        SURGICAL HISTORY:  Past Surgical History:   Procedure Laterality Date    HIP FRACTURE SURGERY Left     KNEE SURGERY Left     replacement    KNEE SURGERY Right     arthritis removed    SHOULDER SURGERY Left     rotator cuff        VITALS:      12/29/2023     5:36 AM 12/29/2023     5:07 AM 12/29/2023     3:00 AM   Vitals   Systolic 144 144 125   Diastolic 77 77 70   Heart Rate 61 61 89   Temp 37 °C (98.6 °F) 37 °C (98.6 °F)    Resp 16 16 16   Height (in) 1.6 m (5' 2.99\") 1.6 m (5' 2.99\")    Weight (lb) 174 174    BMI 30.83 kg/m2 30.83 kg/m2    BSA (m2) 1.87 m2 " 1.87 m2        LABS:   BMP   Lab Results   Component Value Date    GLUCOSE 176 (H) 12/28/2023    CALCIUM 9.1 12/28/2023     12/28/2023    K 4.6 12/28/2023    CO2 23 12/28/2023     (H) 12/28/2023    BUN 26 (H) 12/28/2023    CREATININE 1.13 (H) 12/28/2023   , CBC  Lab Results   Component Value Date    WBC 9.6 12/28/2023    HGB 9.8 (L) 12/28/2023    HCT 29.4 (L) 12/28/2023    MCV 91 12/28/2023     (L) 12/28/2023          IMAGES:  EKG          Encounter Date: 12/28/23   ECG 12 lead   Result Value    Ventricular Rate 83    Atrial Rate 92    QRS Duration 84    QT Interval 362    QTC Calculation(Bazett) 425    R Axis -11    T Axis 42    QRS Count 14    Q Onset 214    T Offset 395    QTC Fredericia 403    Narrative    Atrial fibrillation with a competing junctional pacemaker  Abnormal ECG  When compared with ECG of 28-DEC-2023 15:36,  No significant change was found    See ED provider note for full interpretation and clinical correlation  Confirmed by Ludmila Eller ([id number]) on 12/29/2023 2:05:58 AM      XR chest 1 view 12/28/2023    Narrative  INDICATION:  Preop.  COMPARISON:  9/5/2023.  TECHNIQUE: AP chest, 23:11 hours.  FINDINGS: The heart and mediastinum are normal.  The lungs are without  infiltrates, masses or pulmonary vascular congestion.  No pneumothorax  or pleural effusion are seen. No acute osseous changes.    Impression  Normal AP chest.  Signed by David Jolly MD    , CT Head/Neck       CT cervical spine wo IV contrast     Narrative  PROCEDURE:         SPINE CERVICAL WO CONTRAST - WCT  3025  REASON FOR EXAM: Neck trauma    RESULT: MRN: [id number]  Patient Name: LYNN GOMEZ    STUDY:  SPINE CERVICAL WO CONTRAST; 9/5/2023 1:23 pm    INDICATION:  Weakness and altered mental status. Neck injury with pain.    COMPARISON:  05/24/2018.    ACCESSION NUMBER(S):  [id number]    ORDERING CLINICIAN:  GRANT VOGEL    TECHNIQUE:  Axial images of the cervical spine are obtained without  intravenous  contrast. Coronal and sagittal 2-dimensional reconstructions were also  obtained.    FINDINGS:  There is no fracture or post traumatic subluxation. There is no prevertebral  soft tissue swelling.    Mild disc space narrowing is present at C5-6 level. Uncovertebral joint  spurring is seen on the left at C5-6. There are anterior osteophytes arising  from the endplates at C3-4, C4-5, C5-6, and C6-7 levels.    Note is made a 7 mm soft tissue density arising from the right lateral wall  of the trachea at the level of the sternal notch.    Impression  No fracture or dislocation of cervical spine.    Mild disc space narrowing at C5-6 with cervical spondylosis.    7 mm soft tissue density arising from right lateral wall of trachea at the  level of the sternal notch. This could represent an accumulation of mucus at  this site but the possibility of a neoplastic mass is not excluded.    MACRO:  None.  Dictation workstation:   [id number]  This exam is available in DICOM format to non-affiliated healthcare  facilities on a secure media free searchable basis with prior patient  authorization.  The patient exposure is reported to a radiation dose index  registry.  All CT examinations are performed with one or more of the  following dose reduction techniques: Automated Exposure Control, Adjustment  of mA and/or KV according to patient size, or use of iterative  reconstruction techniques.    Original Interpreting Physician:   AYDEE GARSIA M.D.  Original Transcribed by/Date: MMODAL Sep  5 2023 12:22P  Original Electronically Signed by/Date: AYDEE GARSIA M.D. Sep  5 2023  2:09P    Addendum Interpreting Physician:  Addendum Transcribed by/Date: NO ADDENDUM  Addendum Electronically Signed by/Date:    , CT Chest      No results found for this or any previous visit from the past 730 days.   , CT Abdomin     No results found for this or any previous visit from the past 730 days.    SOCIAL:  Social History     Tobacco Use    Smoking Status Former    Types: Cigarettes   Smokeless Tobacco Never      Social History     Substance and Sexual Activity   Alcohol Use Never      Social History     Substance and Sexual Activity   Drug Use Never        NPO STATUS:  No data recorded    Clinical Areas Reviewed:   Tobacco  Allergies  Meds   Med Hx  Surg Hx   Fam Hx  Soc Hx      PHYSICAL EXAM    Anesthesia Plan    ASA 3     general     intravenous induction   Anesthetic plan and risks discussed with patient and spouse.    Plan discussed with CAA and attending.

## 2023-12-29 NOTE — PROGRESS NOTES
Pharmacy Medication History Review    Adriana Duran is a 78 y.o. female admitted for No Principal Problem currently listed. Pharmacy reviewed the patient's [id number] medications and allergies for accuracy.    The list below reflects the updated PTA list. Comments regarding how patient may be taking medications differently can be found in the Admit Orders Activity  Prior to Admission Medications   Prescriptions  Informant Patient Reported? Taking?   DULoxetine (Cymbalta) 60 mg DR capsule  Self Yes Yes   Sig: Take 1 capsule (60 mg) by mouth once daily. Do not crush or chew.   acetaminophen (Tylenol) 325 mg tablet  Self Yes Yes   Sig: Take 2 tablets (650 mg) by mouth every 4 hours if needed  for (MILD PAIN AND/OR FEVER >38.5).   atorvastatin (Lipitor) 20 mg tablet  Self Yes Yes   Sig: Take 1 tablet (20 mg) by mouth once daily.   busPIRone (Buspar) 10 mg tablet  Self Yes Yes   Sig: Take 1 tablet (10 mg) by mouth 3 times a day.   citalopram (CeleXA) 40 mg tablet  Self Yes Yes   Sig: Take 1 tablet (40 mg) by mouth once daily.   cyanocobalamin (Vitamin B-12) 1,000 mcg tablet  Self Yes Yes   Sig: Take 100 mcg by mouth once daily.   docusate sodium (Colace) 100 mg capsule  Self Yes Yes   Sig: Take 1 capsule (100 mg) by mouth 2 times a day as needed   for constipation.   losartan (Cozaar) 100 mg tablet  Self Yes Yes   Sig: Take 1 tablet (100 mg) by mouth once daily.   multivitamin tablet  Self Yes Yes   Sig: Take 1 tablet by mouth once daily.   sodium bicarbonate 650 mg tablet  Self Yes Yes   Sig: Take 1 tablet (650 mg) by mouth 3 times a day.      Facility-Administered Medications: None        The list below reflects the updated allergy list. Please review each documented allergy for additional clarification and justification.  Allergies  Reviewed by Toño Webster CPhT on 12/29/2023        Severity Reactions Comments    Strawberry Medium Rash     Ibuprofen Not Specified Other Kidney dx    Metformin Not  Specified Other Cause kidney pain            Patient accepts M2B at discharge. Pharmacy has been updated to [facility].    Sources used to complete the med history include:  Patient interviewed about medications taking  [facility] fill history reviewed  [facility] Ambulatory medication list reviewed  11/14/23 Office Visit medication list reviewed  12/28/23 [facility] Summarization of episode note medication list reviewed    Below are additional concerns with the patient's PTA list.  None    ---------------------------------  Toño Webster CPhT  Transitions of Care Technician  Medication reconciliation complete  Please reach out via Explore[employer] Secure Chat for questions,   or if no response call Curriculet or TapZilla.  [facility]

## 2023-12-29 NOTE — ANESTHESIA PROCEDURE NOTES
Airway  Date/Time: 12/29/2023 7:41 AM  Urgency: elective    Airway not difficult    Staffing  Performed: RUPERTO   Authorized by: Lilliana Zhang MD    Performed by: RUPERTO Rodriguez  Patient location during procedure: OR    Indications and Patient Condition  Indications for airway management: anesthesia  Spontaneous Ventilation: absent  Sedation level: deep  Preoxygenated: yes  Patient position: sniffing  MILS not maintained throughout  Mask difficulty assessment: 1 - vent by mask  Planned trial extubation    Final Airway Details  Final airway type: endotracheal airway      Successful airway: ETT  Cuffed: yes   Successful intubation technique: direct laryngoscopy  Endotracheal tube insertion site: oral  Blade: Raya  Blade size: #3  ETT size (mm): 7.0  Cormack-Lehane Classification: grade I - full view of glottis  Placement verified by: chest auscultation and capnometry   Measured from: lips  Number of attempts at approach: 1  Number of other approaches attempted: 0

## 2023-12-29 NOTE — H&P
[facility] Department of Orthopaedic Surgery   Surgical History & Physical <30 Days  12/29/23    Reason for Surgery: L interprosthetic distal femur fx  Planned Procedure: ORIF vs ORIF/IMN L femur    History & Physical Reviewed:  I have reviewed the History and Physical for obtained within the last 30 days. Relevant findings and updates are noted below:  No significant changes.    Home medications were reviewed with significant updates noted below:  No significant changes.    ERAS patient?: No    COVID-19 Risk Consent:   Surgeon has reviewed the key risks related to [place] COVID-19 and subsequent sequelae.     12/29/23 at 1:57 AM - Bravo Link MD

## 2023-12-30 LAB
ALBUMIN SERPL BCP-MCNC: 3.1 G/DL (ref 3.4–5)
ANION GAP SERPL CALC-SCNC: 10 MMOL/L (ref 10–20)
BUN SERPL-MCNC: 25 MG/DL (ref 6–23)
CALCIUM SERPL-MCNC: 8.2 MG/DL (ref 8.6–10.6)
CHLORIDE SERPL-SCNC: 106 MMOL/L (ref 98–107)
CO2 SERPL-SCNC: 25 MMOL/L (ref 21–32)
CREAT SERPL-MCNC: 1.13 MG/DL (ref 0.5–1.05)
ERYTHROCYTE [DISTWIDTH] IN BLOOD BY AUTOMATED COUNT: 14.8 % (ref 11.5–14.5)
GFR SERPL CREATININE-BSD FRML MDRD: 50 ML/MIN/1.73M*2
GLUCOSE SERPL-MCNC: 121 MG/DL (ref 74–99)
HCT VFR BLD AUTO: 17.3 % (ref 36–46)
HCT VFR BLD AUTO: 17.6 % (ref 36–46)
HCT VFR BLD AUTO: 21 % (ref 36–46)
HGB BLD-MCNC: 5.4 G/DL (ref 12–16)
HGB BLD-MCNC: 5.4 G/DL (ref 12–16)
HGB BLD-MCNC: 6.7 G/DL (ref 12–16)
MAGNESIUM SERPL-MCNC: 2.06 MG/DL (ref 1.6–2.4)
MCH RBC QN AUTO: 30 PG (ref 26–34)
MCHC RBC AUTO-ENTMCNC: 30.7 G/DL (ref 32–36)
MCV RBC AUTO: 98 FL (ref 80–100)
NRBC BLD-RTO: 0 /100 WBCS (ref 0–0)
PHOSPHATE SERPL-MCNC: 3.1 MG/DL (ref 2.5–4.9)
PLATELET # BLD AUTO: 96 X10*3/UL (ref 150–450)
POTASSIUM SERPL-SCNC: 4 MMOL/L (ref 3.5–5.3)
RBC # BLD AUTO: 1.8 X10*6/UL (ref 4–5.2)
SODIUM SERPL-SCNC: 137 MMOL/L (ref 136–145)
WBC # BLD AUTO: 5.5 X10*3/UL (ref 4.4–11.3)

## 2023-12-30 PROCEDURE — 96372 THER/PROPH/DIAG INJ SC/IM: CPT

## 2023-12-30 PROCEDURE — 85014 HEMATOCRIT: CPT | Performed by: STUDENT IN AN ORGANIZED HEALTH CARE EDUCATION/TRAINING PROGRAM

## 2023-12-30 PROCEDURE — 80069 RENAL FUNCTION PANEL: CPT | Performed by: STUDENT IN AN ORGANIZED HEALTH CARE EDUCATION/TRAINING PROGRAM

## 2023-12-30 PROCEDURE — 85027 COMPLETE CBC AUTOMATED: CPT | Performed by: STUDENT IN AN ORGANIZED HEALTH CARE EDUCATION/TRAINING PROGRAM

## 2023-12-30 PROCEDURE — 99231 SBSQ HOSP IP/OBS SF/LOW 25: CPT | Performed by: INTERNAL MEDICINE

## 2023-12-30 PROCEDURE — P9016 RBC LEUKOCYTES REDUCED: HCPCS

## 2023-12-30 PROCEDURE — 2500000001 HC RX 250 WO HCPCS SELF ADMINISTERED DRUGS (ALT 637 FOR MEDICARE OP)

## 2023-12-30 PROCEDURE — 1200000002 HC GENERAL ROOM WITH TELEMETRY DAILY

## 2023-12-30 PROCEDURE — 2500000002 HC RX 250 W HCPCS SELF ADMINISTERED DRUGS (ALT 637 FOR MEDICARE OP, ALT 636 FOR OP/ED)

## 2023-12-30 PROCEDURE — 2500000004 HC RX 250 GENERAL PHARMACY W/ HCPCS (ALT 636 FOR OP/ED)

## 2023-12-30 PROCEDURE — 36415 COLL VENOUS BLD VENIPUNCTURE: CPT | Performed by: STUDENT IN AN ORGANIZED HEALTH CARE EDUCATION/TRAINING PROGRAM

## 2023-12-30 PROCEDURE — 83735 ASSAY OF MAGNESIUM: CPT | Performed by: STUDENT IN AN ORGANIZED HEALTH CARE EDUCATION/TRAINING PROGRAM

## 2023-12-30 PROCEDURE — 36430 TRANSFUSION BLD/BLD COMPNT: CPT

## 2023-12-30 PROCEDURE — 2500000001 HC RX 250 WO HCPCS SELF ADMINISTERED DRUGS (ALT 637 FOR MEDICARE OP): Performed by: PHYSICAL THERAPIST

## 2023-12-30 RX ORDER — FERROUS SULFATE, DRIED 160(50) MG
1 TABLET, EXTENDED RELEASE ORAL 2 TIMES DAILY
Status: DISCONTINUED | OUTPATIENT
Start: 2023-12-30 | End: 2024-01-04 | Stop reason: HOSPADM

## 2023-12-30 RX ORDER — OLANZAPINE 2.5 MG/1
2.5 TABLET ORAL ONCE
Status: COMPLETED | OUTPATIENT
Start: 2023-12-30 | End: 2023-12-30

## 2023-12-30 RX ADMIN — Medication 1 TABLET: at 20:04

## 2023-12-30 RX ADMIN — BUSPIRONE HYDROCHLORIDE 10 MG: 10 TABLET ORAL at 08:41

## 2023-12-30 RX ADMIN — DULOXETINE HYDROCHLORIDE 60 MG: 60 CAPSULE, DELAYED RELEASE ORAL at 08:41

## 2023-12-30 RX ADMIN — CEFAZOLIN SODIUM 2 G: 2 INJECTION, SOLUTION INTRAVENOUS at 04:49

## 2023-12-30 RX ADMIN — CEFAZOLIN SODIUM 2 G: 2 INJECTION, SOLUTION INTRAVENOUS at 11:45

## 2023-12-30 RX ADMIN — Medication 5 MG: at 20:04

## 2023-12-30 RX ADMIN — BUSPIRONE HYDROCHLORIDE 10 MG: 10 TABLET ORAL at 20:04

## 2023-12-30 RX ADMIN — POLYETHYLENE GLYCOL 3350 17 G: 17 POWDER, FOR SOLUTION ORAL at 08:41

## 2023-12-30 RX ADMIN — OLANZAPINE 2.5 MG: 2.5 TABLET, FILM COATED ORAL at 21:30

## 2023-12-30 RX ADMIN — ATORVASTATIN CALCIUM 20 MG: 20 TABLET, FILM COATED ORAL at 20:04

## 2023-12-30 RX ADMIN — ENOXAPARIN SODIUM 40 MG: 100 INJECTION SUBCUTANEOUS at 08:41

## 2023-12-30 RX ADMIN — Medication 1 TABLET: at 11:45

## 2023-12-30 RX ADMIN — ACETAMINOPHEN 975 MG: 325 TABLET ORAL at 08:41

## 2023-12-30 RX ADMIN — CITALOPRAM HYDROBROMIDE 40 MG: 40 TABLET ORAL at 08:41

## 2023-12-30 RX ADMIN — BUSPIRONE HYDROCHLORIDE 10 MG: 10 TABLET ORAL at 15:34

## 2023-12-30 ASSESSMENT — COGNITIVE AND FUNCTIONAL STATUS - GENERAL
MOBILITY SCORE: 6
TOILETING: TOTAL
DRESSING REGULAR UPPER BODY CLOTHING: TOTAL
WALKING IN HOSPITAL ROOM: TOTAL
MOVING TO AND FROM BED TO CHAIR: TOTAL
TOILETING: TOTAL
PERSONAL GROOMING: A LITTLE
DAILY ACTIVITIY SCORE: 6
HELP NEEDED FOR BATHING: A LOT
DRESSING REGULAR LOWER BODY CLOTHING: A LOT
STANDING UP FROM CHAIR USING ARMS: TOTAL
STANDING UP FROM CHAIR USING ARMS: TOTAL
TURNING FROM BACK TO SIDE WHILE IN FLAT BAD: A LOT
DRESSING REGULAR LOWER BODY CLOTHING: TOTAL
CLIMB 3 TO 5 STEPS WITH RAILING: TOTAL
EATING MEALS: A LITTLE
PERSONAL GROOMING: TOTAL
DAILY ACTIVITIY SCORE: 14
WALKING IN HOSPITAL ROOM: TOTAL
MOVING FROM LYING ON BACK TO SITTING ON SIDE OF FLAT BED WITH BEDRAILS: TOTAL
EATING MEALS: TOTAL
MOVING FROM LYING ON BACK TO SITTING ON SIDE OF FLAT BED WITH BEDRAILS: A LOT
TURNING FROM BACK TO SIDE WHILE IN FLAT BAD: TOTAL
DRESSING REGULAR UPPER BODY CLOTHING: A LITTLE
MOBILITY SCORE: 8
HELP NEEDED FOR BATHING: TOTAL
CLIMB 3 TO 5 STEPS WITH RAILING: TOTAL
MOVING TO AND FROM BED TO CHAIR: TOTAL

## 2023-12-30 ASSESSMENT — PAIN SCALES - GENERAL
PAINLEVEL_OUTOF10: 0 - NO PAIN
PAINLEVEL_OUTOF10: 0 - NO PAIN

## 2023-12-30 ASSESSMENT — PAIN - FUNCTIONAL ASSESSMENT: PAIN_FUNCTIONAL_ASSESSMENT: 0-10

## 2023-12-30 NOTE — DISCHARGE INSTRUCTIONS
Follow-Up Instructions  You will need to be seen in clinic by Dr. Torres in 3 weeks for a post-operative evaluation.    You will need to call and schedule an appointment, unless there is a previous appointment that appears on your discharge instructions.  The direct orthopaedic clinic appointment line phone number is [phone].  Please do not delay in calling to make this appointment.    You should also follow up with your primary care provider in 1-2 weeks.    Activity Restrictions  1) No driving until further instructed by your orthopaedic physician, which will be addressed at your outpatient appointments.    2) No driving or operating heavy machinery while taking narcotic pain medication.    3) Weight bearing status --> WBAT Left Lower Extremity. You may stand and walk on your left leg as tolerated.     Discharge Medications  You have been sent home with the following home medications: Oxycodone, Colace, and Aspirin.  Please wean yourself off the oxycodone, as tolerated. A good time to take the medication is before physical therapy sessions and bedtime. Colace is a stool softener to reduce the narcotic pain medications cause. Take it twice a day while taking narcotic pain medication to ensure you maintain your regular bowel movement frequency. Baby aspirin is taken twice daily to help reduce your risk of blood clots.    You should also take tylenol 650mg every 6 hours as needed to reduce the amount of oxycodone you need for pain.    Wound care instructions:   1) Leave operative dressing in place until postoperative day 7 (1/4/24). Then remove and leave incision open to air. Let water run freely over incision when showering, do not scrub. Do not soak in pool or tub.    2) Call if any drainage after 7 days, increased redness/warmth/swelling at incision site, abnormal pain/tenderness of the extremity, abnormal swelling of the extremity that does not respond to elevation, SOB/chest pain.

## 2023-12-30 NOTE — PROGRESS NOTES
"Orthopaedic Surgery Progress Note    S:    Doing well, no events overnight. Pain controlled on current regimen.  Denies any new onset numbness, tingling or weakness. Denies nausea, vomiting, fever or chills. PT to see today.    O:  BP (!) 100/43   Pulse 63   Temp 37.2 °C (99 °F)   Resp 11   Ht 1.6 m (5' 2.99\")   Wt 78.9 kg (174 lb)   SpO2 95%   BMI 30.83 kg/m²     GEN - NAD, resting comfortably in hospital bed  HEENT - MMM, EOMI, NCAT  CV - RRR by peripheral palpation, limbs wwp  PULM - NWOB on RA  ABD - Non-distended  NEURO - PENA spontaneously, CNs II - XII grossly intact  PSYCH - Appropriate mood and affect    MSK:  LLE  -Post-op ACE CDI  -Motor intact in DF/PF/EHL/FHL, SILT in saph/sural/SPN/DPN/tibial distributions  -Foot wwp, brisk cap refill, 2+ DP pulse  -Compartments soft and compressible, no pain with passive dorsiflexion    A/P: 78 y.o. female PMH DM2, CKD, L TKA @ [facility] by Dr. Castellano 2018, L IT fx s/p DHS by Dr. Moya at [facility] 2018, healing L foot diabetic ulcer s/p L femur rIMN w ppx plating on 12/29 with Dr. Torres.    - No plans to return to OR with ortho this admission  - Clear liquid diet, okay to advance as tolerated from ortho perspective  - Multimodal pain therapy per primary  - Weightbearing: WBAT LLE. PT/OT consult, to see by POD1 at the latest.   - Perioperative ancef q8 for 24 hours  - DVT PPx: SCD, per primary; recommend ASA 81mg BID  - Continue home meds per primary    Patient stable from orthopaedic perspective. Orthopaedics to follow peripherally at this time. Please call or page with any questions.    Patient should follow up in clinic with Dr. Torres in 3 weeks.    Leave operative dressing in place until follow up. Then remove and leave incision open to air. Let water run freely over incision when showering, do not scrub. Do not soak in pool or tub.     This consult was staffed with attending physician, Dr. Torres.    Mauri Ramirez, PGY-1  Orthopaedic Surgery   Pager: " [phone]  Doc Halo Preferred    Ortho Trauma Service (Epic Chat Preferred)  First call: Aaron Howard MD PGY1  First call: Renetta Mendoza MD PGY1  Second call: Chanel Tinajero DO PGY2  Third call: Robert Matthews DO PGY3    6pm-6am M-F, Holidays, and weekends page Ortho on-call @[phone] with urgent questions/concerns.

## 2023-12-30 NOTE — PROGRESS NOTES
"Adriana Duran is a 78 y.o. female on day 1 of admission presenting with Cari-prosthetic supracondylar fracture of femur.    Subjective   Sleeping this morning but states she is doing well. Sitter in the room.       Objective     Physical Exam  Constitutional:       General: She is not in acute distress.  HENT:      Head: Normocephalic and atraumatic.   Eyes:      Conjunctiva/sclera: Conjunctivae normal.   Cardiovascular:      Rate and Rhythm: Normal rate.      Heart sounds: Normal heart sounds.   Pulmonary:      Effort: Pulmonary effort is normal.      Breath sounds: Normal breath sounds.   Abdominal:      Palpations: Abdomen is soft.   Musculoskeletal:      Left lower leg: No edema.      Comments: LLE covered w/ acewrap. Wrap is clean, dry, intact.   Neurological:      Mental Status: She is oriented to person, place, and time.      Comments: CN 2-12 grossly intact   Psychiatric:         Mood and Affect: Mood normal.       Last Recorded Vitals  Blood pressure (!) 97/49, pulse 92, temperature 37 °C (98.6 °F), resp. rate 11, height 1.6 m (5' 2.99\"), weight 78.9 kg (174 lb), SpO2 95 %.  Intake/Output last 3 Shifts:  I/O last 3 completed shifts:  In: 1583.3 (20.1 mL/kg) [P.O.:100; I.V.:333.3 (4.2 mL/kg); IV Piggyback:1150]  Out: 0 (0 mL/kg)   Weight: 78.9 kg     Relevant Results     Results for orders placed or performed during the hospital encounter of 12/28/23 (from the past 24 hour(s))   VERIFY ABO/Rh Group Test   Result Value Ref Range    ABO TYPE O     Rh TYPE POS    CBC   Result Value Ref Range    WBC 5.5 4.4 - 11.3 x10*3/uL    nRBC 0.0 0.0 - 0.0 /100 WBCs    RBC 1.80 (L) 4.00 - 5.20 x10*6/uL    Hemoglobin 5.4 (LL) 12.0 - 16.0 g/dL    Hematocrit 17.6 (L) 36.0 - 46.0 %    MCV 98 80 - 100 fL    MCH 30.0 26.0 - 34.0 pg    MCHC 30.7 (L) 32.0 - 36.0 g/dL    RDW 14.8 (H) 11.5 - 14.5 %    Platelets 96 (L) 150 - 450 x10*3/uL   Renal function panel   Result Value Ref Range    Glucose 121 (H) 74 - 99 mg/dL    Sodium " 137 136 - 145 mmol/L    Potassium 4.0 3.5 - 5.3 mmol/L    Chloride 106 98 - 107 mmol/L    Bicarbonate 25 21 - 32 mmol/L    Anion Gap 10 10 - 20 mmol/L    Urea Nitrogen 25 (H) 6 - 23 mg/dL    Creatinine 1.13 (H) 0.50 - 1.05 mg/dL    eGFR 50 (L) >60 mL/min/1.73m*2    Calcium 8.2 (L) 8.6 - 10.6 mg/dL    Phosphorus 3.1 2.5 - 4.9 mg/dL    Albumin 3.1 (L) 3.4 - 5.0 g/dL   Magnesium   Result Value Ref Range    Magnesium 2.06 1.60 - 2.40 mg/dL   Hemoglobin and hematocrit, blood   Result Value Ref Range    Hemoglobin 5.4 (LL) 12.0 - 16.0 g/dL    Hematocrit 17.3 (L) 36.0 - 46.0 %               Assessment/Plan   Principal Problem:    Cari-prosthetic supracondylar fracture of femur  Active Problems:    Periprosthetic supracondylar fracture of femur, initial encounter    CARLOS Duran is a 79 yo F with a PMHx of HTN, DM 2 ( not on meds), HLD, anxiety and depression, previous L TKA and ORIF to left hip who presents to the ED as a transfer from OSH for definitive treatment of a displaced left femoral diaphysis fracture. Pt with stable HR and BP but requiring supplemental O2 2/2 desat to 88-90%, now resolved and on room air. Ortho following and pt underwent surgical intervention on 12/29. Detailed plan below:     # Interprosthetic left femur diaphysis fracture   :: mechanical fall from standing   :: CT left femur:  oblique medially displaced fracture of the distal LEFT femoral diaphysis. There is approximate 4 cm of foreshortening.   :: s/p femoral shaft fracture percutaneous assisted reduction and retrograde intramedullary nailing, and left femoral shaft prophylactic plating on 12/29/23   - Pain mgmt:     Tylenol 965 mg q6h PRN for mild pain     Oxy 5/10 PRN for moderate/ severe pain     IV dilaudid 0.2 mg for breakthrough pain   - RCRI Class 1 Risk   - NSQIP Risk of any complication: 3.5% ( below average)   - NSQIP Risk of serious complication: 3.5% ( below average)      #Anemia  - unclear etiology, likely 2/2 surgical  intervention  - Hbg 5.4 with repeat at 5.4 on 12/30, prior Ubg 9.8 on 12/29  - Consented for blood (paper consent in chart)  - T&S active (from 12/28)  - Ordered 1 unit pRBC  [ ] notified ortho, will evaluate wound  [ ] post transfusion CBC ordered, will likely need second unit    # HTN   - c/w home losartan 100 mg every day      # Hx of DMII  :: HbA1c of 5.7 on 2/2023  :: not on DM meds      # HLD   - c/w home atorvastatin 20 mg      # MDD  # anxiety   - c/w home meds: citalopram 40 mg, cymbalta 60 m,g and buspar 10 mg TID     F: none  E: replete PRN   N: reg diet  GI ppx: not indicated      DVT ppx: SCDs    Attending Supervision: seen and discussed with attending physician, Dr. Delvin MD  Family Medicine, PGY-3

## 2023-12-31 LAB
ABO GROUP (TYPE) IN BLOOD: NORMAL
ALBUMIN SERPL BCP-MCNC: 3 G/DL (ref 3.4–5)
ANION GAP SERPL CALC-SCNC: 11 MMOL/L (ref 10–20)
ANTIBODY SCREEN: NORMAL
ATRIAL RATE: 77 BPM
BLOOD EXPIRATION DATE: NORMAL
BLOOD EXPIRATION DATE: NORMAL
BUN SERPL-MCNC: 22 MG/DL (ref 6–23)
CALCIUM SERPL-MCNC: 9 MG/DL (ref 8.6–10.6)
CHLORIDE SERPL-SCNC: 107 MMOL/L (ref 98–107)
CO2 SERPL-SCNC: 25 MMOL/L (ref 21–32)
CREAT SERPL-MCNC: 1.03 MG/DL (ref 0.5–1.05)
DISPENSE STATUS: NORMAL
DISPENSE STATUS: NORMAL
ERYTHROCYTE [DISTWIDTH] IN BLOOD BY AUTOMATED COUNT: 16.3 % (ref 11.5–14.5)
GFR SERPL CREATININE-BSD FRML MDRD: 55 ML/MIN/1.73M*2
GLUCOSE SERPL-MCNC: 91 MG/DL (ref 74–99)
HCT VFR BLD AUTO: 25 % (ref 36–46)
HGB BLD-MCNC: 8.1 G/DL (ref 12–16)
MAGNESIUM SERPL-MCNC: 1.98 MG/DL (ref 1.6–2.4)
MCH RBC QN AUTO: 30.2 PG (ref 26–34)
MCHC RBC AUTO-ENTMCNC: 32.4 G/DL (ref 32–36)
MCV RBC AUTO: 93 FL (ref 80–100)
NRBC BLD-RTO: 0 /100 WBCS (ref 0–0)
PHOSPHATE SERPL-MCNC: 3.3 MG/DL (ref 2.5–4.9)
PLATELET # BLD AUTO: 95 X10*3/UL (ref 150–450)
POTASSIUM SERPL-SCNC: 4.2 MMOL/L (ref 3.5–5.3)
PRODUCT BLOOD TYPE: 5100
PRODUCT BLOOD TYPE: 5100
PRODUCT CODE: NORMAL
PRODUCT CODE: NORMAL
Q ONSET: 211 MS
QRS COUNT: 13 BEATS
QRS DURATION: 84 MS
QT INTERVAL: 382 MS
QTC CALCULATION(BAZETT): 432 MS
QTC FREDERICIA: 415 MS
R AXIS: -32 DEGREES
RBC # BLD AUTO: 2.68 X10*6/UL (ref 4–5.2)
RH FACTOR (ANTIGEN D): NORMAL
SODIUM SERPL-SCNC: 139 MMOL/L (ref 136–145)
T AXIS: 58 DEGREES
T OFFSET: 402 MS
UNIT ABO: NORMAL
UNIT ABO: NORMAL
UNIT NUMBER: NORMAL
UNIT NUMBER: NORMAL
UNIT RH: NORMAL
UNIT RH: NORMAL
UNIT VOLUME: 350
UNIT VOLUME: 350
VENTRICULAR RATE: 77 BPM
WBC # BLD AUTO: 6 X10*3/UL (ref 4.4–11.3)
XM INTEP: NORMAL
XM INTEP: NORMAL

## 2023-12-31 PROCEDURE — 2500000001 HC RX 250 WO HCPCS SELF ADMINISTERED DRUGS (ALT 637 FOR MEDICARE OP)

## 2023-12-31 PROCEDURE — 85027 COMPLETE CBC AUTOMATED: CPT | Performed by: STUDENT IN AN ORGANIZED HEALTH CARE EDUCATION/TRAINING PROGRAM

## 2023-12-31 PROCEDURE — 99231 SBSQ HOSP IP/OBS SF/LOW 25: CPT | Performed by: PODIATRIST

## 2023-12-31 PROCEDURE — 80069 RENAL FUNCTION PANEL: CPT | Performed by: STUDENT IN AN ORGANIZED HEALTH CARE EDUCATION/TRAINING PROGRAM

## 2023-12-31 PROCEDURE — 36415 COLL VENOUS BLD VENIPUNCTURE: CPT | Performed by: STUDENT IN AN ORGANIZED HEALTH CARE EDUCATION/TRAINING PROGRAM

## 2023-12-31 PROCEDURE — 97165 OT EVAL LOW COMPLEX 30 MIN: CPT | Mod: GO

## 2023-12-31 PROCEDURE — 2500000001 HC RX 250 WO HCPCS SELF ADMINISTERED DRUGS (ALT 637 FOR MEDICARE OP): Performed by: PHYSICAL THERAPIST

## 2023-12-31 PROCEDURE — 97166 OT EVAL MOD COMPLEX 45 MIN: CPT | Mod: GO

## 2023-12-31 PROCEDURE — 86901 BLOOD TYPING SEROLOGIC RH(D): CPT | Performed by: STUDENT IN AN ORGANIZED HEALTH CARE EDUCATION/TRAINING PROGRAM

## 2023-12-31 PROCEDURE — 2500000002 HC RX 250 W HCPCS SELF ADMINISTERED DRUGS (ALT 637 FOR MEDICARE OP, ALT 636 FOR OP/ED)

## 2023-12-31 PROCEDURE — 1200000002 HC GENERAL ROOM WITH TELEMETRY DAILY

## 2023-12-31 PROCEDURE — 2500000004 HC RX 250 GENERAL PHARMACY W/ HCPCS (ALT 636 FOR OP/ED)

## 2023-12-31 PROCEDURE — 96372 THER/PROPH/DIAG INJ SC/IM: CPT

## 2023-12-31 PROCEDURE — 83735 ASSAY OF MAGNESIUM: CPT | Performed by: STUDENT IN AN ORGANIZED HEALTH CARE EDUCATION/TRAINING PROGRAM

## 2023-12-31 RX ADMIN — OXYCODONE HYDROCHLORIDE 5 MG: 5 TABLET ORAL at 16:32

## 2023-12-31 RX ADMIN — Medication 1 TABLET: at 08:04

## 2023-12-31 RX ADMIN — CITALOPRAM HYDROBROMIDE 40 MG: 40 TABLET ORAL at 08:04

## 2023-12-31 RX ADMIN — DULOXETINE HYDROCHLORIDE 60 MG: 60 CAPSULE, DELAYED RELEASE ORAL at 08:04

## 2023-12-31 RX ADMIN — ACETAMINOPHEN 975 MG: 325 TABLET ORAL at 00:37

## 2023-12-31 RX ADMIN — ATORVASTATIN CALCIUM 20 MG: 20 TABLET, FILM COATED ORAL at 20:49

## 2023-12-31 RX ADMIN — BUSPIRONE HYDROCHLORIDE 10 MG: 10 TABLET ORAL at 08:04

## 2023-12-31 RX ADMIN — Medication 5 MG: at 20:49

## 2023-12-31 RX ADMIN — LOSARTAN POTASSIUM 100 MG: 100 TABLET, FILM COATED ORAL at 08:04

## 2023-12-31 RX ADMIN — POLYETHYLENE GLYCOL 3350 17 G: 17 POWDER, FOR SOLUTION ORAL at 08:04

## 2023-12-31 RX ADMIN — ENOXAPARIN SODIUM 40 MG: 100 INJECTION SUBCUTANEOUS at 08:04

## 2023-12-31 RX ADMIN — Medication 1 TABLET: at 20:49

## 2023-12-31 RX ADMIN — BUSPIRONE HYDROCHLORIDE 10 MG: 10 TABLET ORAL at 20:48

## 2023-12-31 RX ADMIN — BUSPIRONE HYDROCHLORIDE 10 MG: 10 TABLET ORAL at 15:30

## 2023-12-31 ASSESSMENT — PAIN SCALES - PAIN ASSESSMENT IN ADVANCED DEMENTIA (PAINAD)
TOTALSCORE: 2
BODYLANGUAGE: TENSE, DISTRESSED PACING, FIDGETING
FACIALEXPRESSION: SMILING OR INEXPRESSIVE
NEGVOCALIZATION: OCCASIONAL MOAN/GROAN, LOW SPEECH, NEGATIVE/DISAPPROVING QUALITY
CONSOLABILITY: NO NEED TO CONSOLE
BREATHING: NORMAL

## 2023-12-31 ASSESSMENT — COGNITIVE AND FUNCTIONAL STATUS - GENERAL
DRESSING REGULAR LOWER BODY CLOTHING: TOTAL
PERSONAL GROOMING: A LITTLE
HELP NEEDED FOR BATHING: TOTAL
EATING MEALS: A LITTLE
TOILETING: TOTAL
DAILY ACTIVITIY SCORE: 11
DRESSING REGULAR UPPER BODY CLOTHING: A LOT

## 2023-12-31 ASSESSMENT — PAIN SCALES - GENERAL
PAINLEVEL_OUTOF10: 3
PAINLEVEL_OUTOF10: 8

## 2023-12-31 ASSESSMENT — ACTIVITIES OF DAILY LIVING (ADL): ADL_ASSISTANCE: INDEPENDENT

## 2023-12-31 ASSESSMENT — PAIN - FUNCTIONAL ASSESSMENT
PAIN_FUNCTIONAL_ASSESSMENT: 0-10
PAIN_FUNCTIONAL_ASSESSMENT: 0-10

## 2023-12-31 ASSESSMENT — PAIN DESCRIPTION - LOCATION: LOCATION: HEAD

## 2023-12-31 NOTE — PROGRESS NOTES
12/31/23 1504   Current Planned Discharge Disposition   Current Planned Discharge Disposition SNF     Team notified writer that patient and spouse is requesting a referral be sent to [facility]. Referral sent as requested.  Celine Boothe RN  (Weekend Transitional Care Coordinator-TCC, send Epic message)

## 2023-12-31 NOTE — CARE PLAN
Repeat HGB after 1st unit RBCs was 6.7. An additional unit of PRBCs received overnight. Patient A+Oxself- agitated and very irritable. Started shift yelling with paranoia regarding husbands whereabouts, the sounds of ivs beeping and doors shutting. Order for 12.5mg oral zyprexa given with good result to affect and irritability. Patient later with complaints of headache during transfusion but pt describing HA with different locations. severities and descriptions when assessed by RN and MD- PRN tylenol administered. No drainage, bleeding or pain reported at surgical site throughout the shift. Patient slept the rest of the shift without event.    The clinical goals for the shift include Pt will tolerate 1 unit PRBC by the end of the shift. Goal met.

## 2023-12-31 NOTE — PROGRESS NOTES
Occupational Therapy    Evaluation    Patient Name: Adriana Duran  MRN: [id number]  Today's Date: 12/31/2023  Time Calculation  Start Time: 1321  Stop Time: 1342  Time Calculation (min): 21 min        Assessment:  OT Assessment: Adriana is a 77yo female presenting with deficits in ADLs, IADLs, transfers, functional actvity tolerance, pain management, cognition, and UE strength. Pt would benefit from skilled OT intervention to return to PLOF safely  Prognosis: Fair  Barriers to Discharge: None  Evaluation/Treatment Tolerance: Patient limited by pain  Medical Staff Made Aware: Yes  End of Session Communication: Bedside nurse  End of Session Patient Position: Up in chair  OT Assessment Results: Decreased ADL status, Decreased cognition, Decreased functional mobility, Decreased fine motor control, Decreased gross motor control, Decreased IADLs, Decreased endurance, Decreased upper extremity strength  Prognosis: Fair  Barriers to Discharge: None  Evaluation/Treatment Tolerance: Patient limited by pain  Medical Staff Made Aware: Yes  Strengths: Premorbid level of function, Support of Caregivers, Living arrangement secure  Barriers to Participation: Coping skills, Insight into problems  Plan:  Treatment Interventions: ADL retraining, Functional transfer training, UE strengthening/ROM, Endurance training, Cognitive reorientation, Patient/family training, Compensatory technique education  OT Frequency: 3 times per week  OT Discharge Recommendations: Moderate intensity level of continued care  OT Recommended Transfer Status: Assist of 2 (Max x1 SPT)  OT - OK to Discharge: Yes  Treatment Interventions: ADL retraining, Functional transfer training, UE strengthening/ROM, Endurance training, Cognitive reorientation, Patient/family training, Compensatory technique education    Subjective   Current Problem:  1. Periprosthetic supracondylar fracture of femur, initial encounter  Case Request Operating Room: Open Reduction  "Internal Fixation Femur, Insertion Intramedullary Nail Femur    Case Request Operating Room: Open Reduction Internal Fixation Femur, Insertion Intramedullary Nail Femur    calcium carbonate-vitamin D3 500 mg-5 mcg (200 unit) tablet      2. Closed fracture of left femur, unspecified fracture morphology, unspecified portion of femur, initial encounter (CMS/[facility])          General:  General  Reason for Referral: L femur rIMN w ppx plating on 12/29 with Dr. Torres.  Past Medical History Relevant to Rehab: HTN, DM 2, HLD, anxiety and depression, previous L TKA and ORIF to left hip, OA of right knee  Prior to Session Communication: Bedside nurse  Patient Position Received: Bed, 3 rail up  Preferred Learning Style: auditory, verbal  General Comment: Pt hemo more stable, 8.1, Rn reporting pt is ok to work with. Pt demos labile emotions, with bouts of agitation. Therapist providing positive encouragement throughout. Pt agreeable to therapy session  Precautions:  LE Weight Bearing Status: Weight Bearing as Tolerated (LLE)  Vital Signs:     Pain:  Pain Assessment  Pain Assessment: 0-10  Pain Score: 8  Pain Type: Acute pain  Pain Location: Back    Objective   Cognition:  Overall Cognitive Status: Impaired  Orientation Level: Disoriented to situation, Disoriented to time, Disoriented to place (unable to state she is at hospital when asked  but later in session stating \"does not want to be in hospital anymore\". Oriented to first and last name, but not age)           Home Living:  Type of Home: House  Lives With: Spouse  Bathroom Shower/Tub: Tub/shower unit  Home Living Comments: questionable historian, requires questions repeated, did not provide some answers, tangential thoughts noted  Prior Function:  Level of [place]: Independent with ADLs and functional transfers, Independent with homemaking with ambulation  ADL Assistance: Independent  Homemaking Assistance: Independent  Ambulatory Assistance: Independent (reports WW " use)  Prior Function Comments: Questionable historian, denies needs at baseline  IADL History:     ADL:  LE Dressing Assistance: Total  LE Dressing Deficit: Don/doff R sock, Don/doff L sock  Activity Tolerance:  Endurance: Tolerates 10 - 20 min exercise with multiple rests  Bed Mobility/Transfers: Bed Mobility  Bed Mobility: Yes  Bed Mobility 1  Bed Mobility 1: Supine to sitting  Level of Assistance 1: Moderate assistance, Moderate verbal cues  Bed Mobility Comments 1: requires Mod A for LLE, mod cues for RLE and hand placement    Transfers  Transfer: Yes  Transfer 1  Transfer From 1: Sit to, Stand to  Transfer to 1: Sit, Stand  Technique 1: Sit to stand, Stand to sit  Transfer Level of Assistance 1: Maximum assistance  Trials/Comments 1: arm in arm assist 2./2 retrolean, completere x3 trials. Coming to full stand with Max A each time, pt getting fearful and requesting to sit with x2 trials. Pt requesting 3rd trial  Transfers 2  Transfer From 2: Stand to  Transfer to 2: Chair with arms  Technique 2: Stand pivot  Transfer Level of Assistance 2: Maximum assistance, Arm in arm assistance, Moderate verbal cues   Modalities:     Vision:Vision - Basic Assessment  Current Vision: Wears glasses all the time  Sensation:  Light Touch: No apparent deficits  Strength:     Perception:     Coordination:  Movements are Fluid and Coordinated: Yes (BUE)  Coordination Comment: decreased FMC 2/2 arthritis, reports difficulty opening feeding containers   Hand Function:     Extremities: RUE   RUE : Within Functional Limits and LUE   LUE: Within Functional Limits        Outcome Measures:[facility] Daily Activity  Putting on and taking off regular lower body clothing: Total  Bathing (including washing, rinsing, drying): Total  Putting on and taking off regular upper body clothing: A lot  Toileting, which includes using toilet, bedpan or urinal: Total  Taking care of personal grooming such as brushing teeth: A little  Eating Meals: A  little  Daily Activity - Total Score: 11         and OT Adult Other Outcome Measures  4AT: 4, positive    Education Documentation  Body Mechanics, taught by Aurelia Sidhu OT at 12/31/2023  2:02 PM.  Learner: Patient  Readiness: Acceptance  Method: Explanation  Response: Needs Reinforcement    ADL Training, taught by Aurelia Sidhu OT at 12/31/2023  2:02 PM.  Learner: Patient  Readiness: Acceptance  Method: Explanation  Response: Needs Reinforcement    Education Comments  No comments found.        OP EDUCATION:       Goals:  Encounter Problems       Encounter Problems (Active)       ADLs       Patient with complete lower body dressing with minimal assist  level of assistance donning and doffing all LE clothes  with PRN adaptive equipment while edge of bed  (Progressing)       Start:  12/31/23    Expected End:  01/21/24            Patient will complete toileting including hygiene clothing management/hygiene with minimal assist  level of assistance. (Progressing)       Start:  12/31/23    Expected End:  01/21/24               COGNITION/SAFETY       Patient will score WFL on standardized cognitive assessment with min cues and within reasonable time frame (Progressing)       Start:  12/31/23    Expected End:  01/21/24            Patient will demonstrated orientation x 3 with visual cues. (Progressing)       Start:  12/31/23    Expected End:  01/21/24       ORIENTATION            EXERCISE/STRENGTHENING       Patient with increase BUE to 4/5 strength. (Progressing)       Start:  12/31/23    Expected End:  01/21/24               TRANSFERS       Patient will perform bed mobility minimal assist  level of assistance and bed rails in order to improve safety and independence with mobility (Progressing)       Start:  12/31/23    Expected End:  01/21/24            Patient will complete functional transfer to toilet with front wheeled walker with minimal assist  level of assistance. (Progressing)       Start:  12/31/23     Expected End:  01/21/24 12/31/23 at 2:03 PM - Aurelia Sidhu OT

## 2023-12-31 NOTE — PROGRESS NOTES
"Adriana Duran is a 78 y.o. female on day 2 of admission presenting with Cari-prosthetic supracondylar fracture of femur.    Subjective   Patient is doing well. Denies any concerns. She had a headache overnight. She denies any hip pain. Per overnight team, she sun-downed. This is baseline per her .        Objective     Physical Exam  Constitutional:       Appearance: Normal appearance.   HENT:      Head: Normocephalic.   Cardiovascular:      Rate and Rhythm: Normal rate and regular rhythm.   Pulmonary:      Effort: Pulmonary effort is normal. No respiratory distress.      Breath sounds: Normal breath sounds. No wheezing.   Abdominal:      General: Bowel sounds are normal. There is no distension.      Palpations: Abdomen is soft.   Musculoskeletal:         General: No swelling.   Skin:     General: Skin is warm and dry.   Neurological:      General: No focal deficit present.      Mental Status: She is alert. Mental status is at baseline.   Psychiatric:         Mood and Affect: Mood normal.         Behavior: Behavior normal.         Last Recorded Vitals  Blood pressure 105/52, pulse 88, temperature 36.4 °C (97.5 °F), temperature source Temporal, resp. rate 16, height 1.6 m (5' 2.99\"), weight 78.9 kg (174 lb), SpO2 98 %.  Intake/Output last 3 Shifts:  I/O last 3 completed shifts:  In: 400.7 (5.1 mL/kg) [Blood:400.7]  Out: 1700 (21.5 mL/kg) [Urine:1700 (0.6 mL/kg/hr)]  Weight: 78.9 kg     Relevant Results  Scheduled medications  atorvastatin, 20 mg, oral, Nightly  busPIRone, 10 mg, oral, TID  calcium carbonate-vitamin D3, 1 tablet, oral, BID  DULoxetine, 60 mg, oral, Daily  enoxaparin, 40 mg, subcutaneous, Daily  losartan, 100 mg, oral, Daily  melatonin, 5 mg, oral, Nightly  polyethylene glycol, 17 g, oral, Daily      Continuous medications  [Held by provider] D5 % and 0.9 % sodium chloride, 125 mL/hr      PRN medications  PRN medications: acetaminophen, HYDROmorphone, OLANZapine, oxyCODONE, oxyCODONE "     Results for orders placed or performed during the hospital encounter of 12/28/23 (from the past 24 hour(s))   Hemoglobin and hematocrit, blood   Result Value Ref Range    Hemoglobin 6.7 (L) 12.0 - 16.0 g/dL    Hematocrit 21.0 (L) 36.0 - 46.0 %   Type and screen   Result Value Ref Range    ABO TYPE O     Rh TYPE POS     ANTIBODY SCREEN NEG    CBC   Result Value Ref Range    WBC 6.0 4.4 - 11.3 x10*3/uL    nRBC 0.0 0.0 - 0.0 /100 WBCs    RBC 2.68 (L) 4.00 - 5.20 x10*6/uL    Hemoglobin 8.1 (L) 12.0 - 16.0 g/dL    Hematocrit 25.0 (L) 36.0 - 46.0 %    MCV 93 80 - 100 fL    MCH 30.2 26.0 - 34.0 pg    MCHC 32.4 32.0 - 36.0 g/dL    RDW 16.3 (H) 11.5 - 14.5 %    Platelets 95 (L) 150 - 450 x10*3/uL   Renal function panel   Result Value Ref Range    Glucose 91 74 - 99 mg/dL    Sodium 139 136 - 145 mmol/L    Potassium 4.2 3.5 - 5.3 mmol/L    Chloride 107 98 - 107 mmol/L    Bicarbonate 25 21 - 32 mmol/L    Anion Gap 11 10 - 20 mmol/L    Urea Nitrogen 22 6 - 23 mg/dL    Creatinine 1.03 0.50 - 1.05 mg/dL    eGFR 55 (L) >60 mL/min/1.73m*2    Calcium 9.0 8.6 - 10.6 mg/dL    Phosphorus 3.3 2.5 - 4.9 mg/dL    Albumin 3.0 (L) 3.4 - 5.0 g/dL   Magnesium   Result Value Ref Range    Magnesium 1.98 1.60 - 2.40 mg/dL          Assessment/Plan   Principal Problem:    Cari-prosthetic supracondylar fracture of femur  Active Problems:    Periprosthetic supracondylar fracture of femur, initial encounter    Adriana Duran is a 78 year old F with a PMHx of HTN, DM 2 (not on meds), HLD, anxiety and depression, previous L TKA and ORIF to left hip who presented to the ED as a transfer from OSH for definitive treatment of a displaced left femoral diaphysis fracture.  Orthopedics took patient to the OR and patient underwent surgical intervention on 12/29/23 for L femoral shaft fracture percutaneous assisted reduction and retrograde intramedullary nailing. Patient admitted to Hospitalist Team D for further medical management.      Interprosthetic  left femur diaphysis fracture   S/p  Left femoral shaft fracture percutaneous assisted reduction and retrograde intramedullary nailing and Left femoral shaft prophylactic plating on 12/29/23  :: mechanical fall from standing   - Orthopedics consulted. Appreciate recs  ::: WBAT LLE  - Pain control:     Tylenol 965 mg q6h PRN for mild pain     Oxy 5/10 PRN for moderate/ severe pain     IV dilaudid 0.2 mg for breakthrough pain   - OT rec mod intensity      Agitation at night  - says this is her baseline   -Discontinued sitter today; can re-order as needed   -Olanzapine 2.5 mg q6hrs PRN agitation     Acute Anemia (Likely 2/2 surgical intervention)  - Patient received 2 unit pRBCs yesterday. Hb today at 8.1     HTN   - Continue home losartan 100 mg every day      History of DMII  :: HbA1c of 5.7 on 2/2023  :: not on DM meds      HLD   - Continue home atorvastatin 20 mg      MDD  Anxiety   - Continue home meds: Cymbalta 60 mg and buspar 10 mg TID  - Discontinued home citalopram 40 mg as worry Serotonin Syndrome with SSRI and SNRI     Dispo  - Pending [facility]- referral sent     F: none  E: replete PRN   N: regular diet  GI ppx: not indicated   DVT ppx: SCDs; lovenox 40 mg daily      Patient dicussed with attending, Dr. Noland.    Plan preliminary until cosigned by attending physician.     Nancie Jeffrey MD   PGY1, Family Medicine   [facility]  Available by [employer]

## 2023-12-31 NOTE — SIGNIFICANT EVENT
Called to patient's bedside due to Hgb of 5.4 with concern for surgical incision bleed. On exam, there is no visible bleeding through the ACE wrap and underlying mepilex dressings are CDI. No concern for significant post-op incision site bleed at this time. Likely due to intra-op blood loss and should improve with time, transfuse as needed.     Ortho trauma to follow peripherally. Please call or page with any questions or concerns.    6pm-6am M-F, Holidays, and weekends page Ortho on-call @[phone] with urgent questions/concerns.     Mauri Ramirez MD, PGY-1  Orthopedic Surgery  [phone]  Epic Chat Preferred

## 2024-01-01 LAB
ALBUMIN SERPL BCP-MCNC: 3.3 G/DL (ref 3.4–5)
ANION GAP SERPL CALC-SCNC: 15 MMOL/L (ref 10–20)
APPEARANCE UR: CLEAR
BILIRUB UR STRIP.AUTO-MCNC: NEGATIVE MG/DL
BUN SERPL-MCNC: 21 MG/DL (ref 6–23)
CALCIUM SERPL-MCNC: 9.3 MG/DL (ref 8.6–10.6)
CHLORIDE SERPL-SCNC: 104 MMOL/L (ref 98–107)
CO2 SERPL-SCNC: 26 MMOL/L (ref 21–32)
COLOR UR: YELLOW
CREAT SERPL-MCNC: 0.97 MG/DL (ref 0.5–1.05)
ERYTHROCYTE [DISTWIDTH] IN BLOOD BY AUTOMATED COUNT: 15.4 % (ref 11.5–14.5)
GFR SERPL CREATININE-BSD FRML MDRD: 60 ML/MIN/1.73M*2
GLUCOSE SERPL-MCNC: 99 MG/DL (ref 74–99)
GLUCOSE UR STRIP.AUTO-MCNC: NEGATIVE MG/DL
HCT VFR BLD AUTO: 28.9 % (ref 36–46)
HGB BLD-MCNC: 9.2 G/DL (ref 12–16)
KETONES UR STRIP.AUTO-MCNC: NEGATIVE MG/DL
LEUKOCYTE ESTERASE UR QL STRIP.AUTO: NEGATIVE
MAGNESIUM SERPL-MCNC: 1.93 MG/DL (ref 1.6–2.4)
MCH RBC QN AUTO: 30.4 PG (ref 26–34)
MCHC RBC AUTO-ENTMCNC: 31.8 G/DL (ref 32–36)
MCV RBC AUTO: 95 FL (ref 80–100)
NITRITE UR QL STRIP.AUTO: NEGATIVE
NRBC BLD-RTO: 0 /100 WBCS (ref 0–0)
PH UR STRIP.AUTO: 6 [PH]
PHOSPHATE SERPL-MCNC: 3.8 MG/DL (ref 2.5–4.9)
PLATELET # BLD AUTO: 143 X10*3/UL (ref 150–450)
POTASSIUM SERPL-SCNC: 4.1 MMOL/L (ref 3.5–5.3)
PROT UR STRIP.AUTO-MCNC: NEGATIVE MG/DL
RBC # BLD AUTO: 3.03 X10*6/UL (ref 4–5.2)
RBC # UR STRIP.AUTO: NEGATIVE /UL
SODIUM SERPL-SCNC: 141 MMOL/L (ref 136–145)
SP GR UR STRIP.AUTO: 1.01
UROBILINOGEN UR STRIP.AUTO-MCNC: <2 MG/DL
WBC # BLD AUTO: 6.1 X10*3/UL (ref 4.4–11.3)

## 2024-01-01 PROCEDURE — 2500000004 HC RX 250 GENERAL PHARMACY W/ HCPCS (ALT 636 FOR OP/ED): Performed by: INTERNAL MEDICINE

## 2024-01-01 PROCEDURE — 99232 SBSQ HOSP IP/OBS MODERATE 35: CPT | Performed by: PODIATRIST

## 2024-01-01 PROCEDURE — 97161 PT EVAL LOW COMPLEX 20 MIN: CPT | Mod: GP

## 2024-01-01 PROCEDURE — 2500000001 HC RX 250 WO HCPCS SELF ADMINISTERED DRUGS (ALT 637 FOR MEDICARE OP)

## 2024-01-01 PROCEDURE — 2500000001 HC RX 250 WO HCPCS SELF ADMINISTERED DRUGS (ALT 637 FOR MEDICARE OP): Performed by: PHYSICAL THERAPIST

## 2024-01-01 PROCEDURE — 96372 THER/PROPH/DIAG INJ SC/IM: CPT

## 2024-01-01 PROCEDURE — 97110 THERAPEUTIC EXERCISES: CPT | Mod: GP

## 2024-01-01 PROCEDURE — 2500000004 HC RX 250 GENERAL PHARMACY W/ HCPCS (ALT 636 FOR OP/ED)

## 2024-01-01 PROCEDURE — 2500000002 HC RX 250 W HCPCS SELF ADMINISTERED DRUGS (ALT 637 FOR MEDICARE OP, ALT 636 FOR OP/ED)

## 2024-01-01 PROCEDURE — 36415 COLL VENOUS BLD VENIPUNCTURE: CPT

## 2024-01-01 PROCEDURE — S0166 INJ OLANZAPINE 2.5MG: HCPCS

## 2024-01-01 PROCEDURE — 1200000002 HC GENERAL ROOM WITH TELEMETRY DAILY

## 2024-01-01 PROCEDURE — 81003 URINALYSIS AUTO W/O SCOPE: CPT

## 2024-01-01 PROCEDURE — 2500000004 HC RX 250 GENERAL PHARMACY W/ HCPCS (ALT 636 FOR OP/ED): Performed by: PODIATRIST

## 2024-01-01 PROCEDURE — 80069 RENAL FUNCTION PANEL: CPT

## 2024-01-01 PROCEDURE — 83735 ASSAY OF MAGNESIUM: CPT

## 2024-01-01 PROCEDURE — 85027 COMPLETE CBC AUTOMATED: CPT

## 2024-01-01 RX ORDER — CEFTRIAXONE 1 G/50ML
1 INJECTION, SOLUTION INTRAVENOUS EVERY 24 HOURS
Status: DISCONTINUED | OUTPATIENT
Start: 2024-01-01 | End: 2024-01-02

## 2024-01-01 RX ORDER — QUETIAPINE FUMARATE 25 MG/1
25 TABLET, FILM COATED ORAL EVERY 12 HOURS PRN
Status: DISCONTINUED | OUTPATIENT
Start: 2024-01-01 | End: 2024-01-04 | Stop reason: HOSPADM

## 2024-01-01 RX ADMIN — ENOXAPARIN SODIUM 40 MG: 100 INJECTION SUBCUTANEOUS at 08:46

## 2024-01-01 RX ADMIN — Medication 1 TABLET: at 08:46

## 2024-01-01 RX ADMIN — OLANZAPINE 2.5 MG: 10 INJECTION, POWDER, LYOPHILIZED, FOR SOLUTION INTRAMUSCULAR at 17:52

## 2024-01-01 RX ADMIN — LOSARTAN POTASSIUM 100 MG: 100 TABLET, FILM COATED ORAL at 08:46

## 2024-01-01 RX ADMIN — OLANZAPINE 2.5 MG: 10 INJECTION, POWDER, LYOPHILIZED, FOR SOLUTION INTRAMUSCULAR at 21:55

## 2024-01-01 RX ADMIN — BUSPIRONE HYDROCHLORIDE 10 MG: 10 TABLET ORAL at 08:46

## 2024-01-01 RX ADMIN — BUSPIRONE HYDROCHLORIDE 10 MG: 10 TABLET ORAL at 17:23

## 2024-01-01 RX ADMIN — Medication 5 MG: at 20:42

## 2024-01-01 RX ADMIN — QUETIAPINE FUMARATE 25 MG: 25 TABLET ORAL at 21:42

## 2024-01-01 RX ADMIN — BUSPIRONE HYDROCHLORIDE 10 MG: 10 TABLET ORAL at 20:42

## 2024-01-01 RX ADMIN — CEFTRIAXONE SODIUM 1 G: 1 INJECTION, SOLUTION INTRAVENOUS at 20:45

## 2024-01-01 RX ADMIN — ATORVASTATIN CALCIUM 20 MG: 20 TABLET, FILM COATED ORAL at 20:42

## 2024-01-01 RX ADMIN — Medication 1 TABLET: at 20:42

## 2024-01-01 RX ADMIN — ACETAMINOPHEN 975 MG: 325 TABLET ORAL at 02:00

## 2024-01-01 RX ADMIN — POLYETHYLENE GLYCOL 3350 17 G: 17 POWDER, FOR SOLUTION ORAL at 08:47

## 2024-01-01 RX ADMIN — DULOXETINE HYDROCHLORIDE 60 MG: 60 CAPSULE, DELAYED RELEASE ORAL at 08:46

## 2024-01-01 RX ADMIN — SODIUM CHLORIDE, POTASSIUM CHLORIDE, SODIUM LACTATE AND CALCIUM CHLORIDE 250 ML: 600; 310; 30; 20 INJECTION, SOLUTION INTRAVENOUS at 12:39

## 2024-01-01 ASSESSMENT — PAIN - FUNCTIONAL ASSESSMENT: PAIN_FUNCTIONAL_ASSESSMENT: 0-10

## 2024-01-01 ASSESSMENT — COGNITIVE AND FUNCTIONAL STATUS - GENERAL
STANDING UP FROM CHAIR USING ARMS: A LOT
MOVING TO AND FROM BED TO CHAIR: A LOT
MOVING FROM LYING ON BACK TO SITTING ON SIDE OF FLAT BED WITH BEDRAILS: A LITTLE
WALKING IN HOSPITAL ROOM: A LOT
MOBILITY SCORE: 12
TURNING FROM BACK TO SIDE WHILE IN FLAT BAD: A LOT
CLIMB 3 TO 5 STEPS WITH RAILING: TOTAL

## 2024-01-01 ASSESSMENT — PAIN SCALES - GENERAL: PAINLEVEL_OUTOF10: 0 - NO PAIN

## 2024-01-01 NOTE — NURSING NOTE
"Pt. Confused through most of the shift, A&O to self. Straight cath performed at request of provider to obtain urine sample. Before procedure pt began hallucinating and stated she did not want RN to put the catheter in \"in front of all of these people with everyone watching\" Pt then stated that she knew she was hallucinating but that they all appeared to be there. Pt became increasingly more agitated and confused as the shift progressed. Pt often talking to herself, yelling out. IM Zyprexa given for agitation, however it did little to address agitation. Seroquel 25mg ordered. Pt resting comfortably at end of shift.   "

## 2024-01-01 NOTE — CARE PLAN
The patient's goals for the shift include  get out of here.    The clinical goals for the shift include pt will remain hemodynamically stable for duration of shift.    Over the shift, the patient made progress towards all goals.

## 2024-01-01 NOTE — PROGRESS NOTES
Physical Therapy    Physical Therapy Evaluation & Treatment    Patient Name: Adriana Duran  MRN: [id number]  Today's Date: 1/1/2024   Time Calculation  Start Time: 1033  Stop Time: 1058  Time Calculation (min): 25 min    Assessment/Plan   PT Assessment  PT Assessment Results: Decreased strength, Impaired balance, Decreased mobility, Decreased cognition, Pain  Rehab Prognosis: Good  Barriers to Discharge: N/A  End of Session Communication: Bedside nurse (via secure chat)  Assessment Comment: Will benefit from continued skilled PT to address all mobility deficits.  End of Session Patient Position: Bed, 3 rail up, Alarm on   IP OR SWING BED PT PLAN  Inpatient or Swing Bed: Inpatient  PT Plan  Treatment/Interventions: Bed mobility, Transfer training, Gait training, Balance training, Strengthening, Therapeutic exercise  PT Plan: Skilled PT  PT Frequency: 5 times per week  PT Discharge Recommendations: Moderate intensity level of continued care  PT Recommended Transfer Status: Assist x2  PT - OK to Discharge: Yes (POC/goals/discharge rec created)    Subjective     General Visit Information:  General  Reason for Referral: fall with (L) periprosthetic femur fx s/p rIMN with plating 12/29  Past Medical History Relevant to Rehab: CKD, DM, (L) hip sx, (L) TKA, (R) knee surgery, (L) shoulder surgery, neuropathy.  Per EMR, has demo'ed sundowning during hospitalization.  Prior to Session Communication:  (bedside nurse unavailable/in another pt room)  General Comment: Pt demos confusion during session though easily redirected to task at hand.  Demos need for assistance for all mobility at this time. Will continue to follow.  Home Living:  Home Living  Type of Home: House  Lives With: Spouse  Home Adaptive Equipment: Walker rolling or standard (unsure if 2 or 4 wheeled walker)  Home Layout: Able to live on main level with bedroom/bathroom  Home Access: Stairs to enter with rails  Entrance Stairs-Number of Steps: 4  Prior Level  "of Function:  Prior Function Per Pt/Caregiver Report  Ambulatory Assistance:  (reports (I) in household, states she does not go out \"much\", denies using walker all the time)  Precautions:  Precautions  LE Weight Bearing Status: Weight Bearing as Tolerated ((L) LE)  Medical Precautions: Fall precautions     Objective   Pain:  Pain Assessment  Pain Assessment: 0-10  Pain Score: 0 - No pain (at rest. Does not give pain rating during session though does admit to pain during flexion at (L) knee)  Pain Interventions:  (activity modifications prn)  Response to Interventions: appears comfortable post activity  Cognition:  Cognition  Arousal/Alertness: Appropriate responses to stimuli  Orientation Level: Disoriented to place, Disoriented to situation  Following Commands: Follows one step commands with repetition  Problem Solving: Assistance required to generate solutions  Cognition Comments: reports she just had surgery but was not sure what leg was \"worked on\". When discussing home setup and who she lives with, pt initially denies living with , later states she does live with him but had a fight so not sure if he'll still live with her.    General Assessments:     Activity Tolerance  Endurance: Endurance does not limit participation in activity    Sensation  Sensation Comment: baseline neuropathy        Perception  Inattention/Neglect: Appears intact  Initiation: Cues to initiate tasks  Motor Planning: Appears intact  Perseveration: Not present     Postural Control  Postural Control: Within Functional Limits    Static Sitting Balance  Static Sitting-Balance Support: Feet supported  Static Sitting-Level of Assistance: Close supervision    Static Standing Balance  Static Standing-Balance Support: Bilateral upper extremity supported  Static Standing-Level of Assistance: Minimum assistance  Static Standing-Comment/Number of Minutes: whw  Dynamic Standing Balance  Dynamic Standing-Balance Support: Bilateral upper " extremity supported  Dynamic Standing-Comments: max Ax1 with whw for weightshifts and amb attempts  Functional Assessments:  Bed Mobility  Bed Mobility: Yes  Bed Mobility 1  Bed Mobility 1: Supine to sitting  Level of Assistance 1: Moderate assistance, Moderate verbal cues, Moderate tactile cues (x1)  Bed Mobility Comments 1: HOB raised, use of draw sheet and bed rails  Bed Mobility 2  Bed Mobility  2: Sitting to supine  Level of Assistance 2: Moderate assistance, Moderate verbal cues  Bed Mobility 3  Bed Mobility 3: Scooting (in supine, bed in trendelenberg)  Level of Assistance 3: Moderate assistance, Moderate verbal cues, Moderate tactile cues (x1)  Bed Mobility Comments 3: able to reach (B) UEs to head board with PT at draw sheet to scoot higher in bed    Transfers  Transfer: Yes  Transfer 1  Transfer From 1: Bed to  Transfer to 1: Stand  Transfer Device 1: Walker (bed height elevated)  Transfer Level of Assistance 1: Maximum assistance, Moderate verbal cues (x1)  Trials/Comments 1: completed x2  Transfers 2  Transfer From 2: Stand to  Transfer to 2: Bed  Transfer Level of Assistance 2: Minimum assistance, Moderate verbal cues (x1)    Ambulation/Gait Training  Ambulation/Gait Training Performed: Yes  Ambulation/Gait Training 1  Surface 1: Level tile  Device 1: Rolling walker  Assistance 1: Maximum assistance, Maximum verbal cues (x1)  Quality of Gait 1:  (pt with buckle of (L) knee upon attempt to side step with (R) LE, required assistance to sit back onto bed for safety)  Comments/Distance (ft) 1: 0    Extremity/Trunk Assessments:  RUE   RUE : Within Functional Limits  LUE   LUE: Within Functional Limits  RLE   RLE : Exceptions to WFL  Strength RLE  RLE Overall Strength: Greater than or equal to 3/5 as evidenced by functional mobility  LLE   LLE : Exceptions to WFL  Strength LLE  LLE Overall Strength:  (demos grossly 3-/5 at hip and knee)  Treatments:  Therapeutic Exercise  Therapeutic Exercise Performed:  Yes  Therapeutic Exercise Activity 1: (B) LE ankle pumps x12  Therapeutic Exercise Activity 2: (B) LE heel slides (AAROM on (L)) x 10 reps  Therapeutic Exercise Activity 3: (B) LE LAQ x 10 reps    Ambulation/Gait Training  Ambulation/Gait Training Performed: Yes  Ambulation/Gait Training 2  Surface 2: Level tile  Device 2: Rolling walker  Assistance 2: Maximum assistance, Maximum verbal cues  Quality of Gait 2:  (pivots with (R) LE, limited advancement of (L) LE)  Comments/Distance (ft) 2: heavy cues for whw use, no buckle with reattempts at side steps, completes ~2 side steps  Outcome Measures:  [facility] Basic Mobility  Turning from your back to your side while in a flat bed without using bedrails: A little  Moving from lying on your back to sitting on the side of a flat bed without using bedrails: A lot  Moving to and from bed to chair (including a wheelchair): A lot  Standing up from a chair using your arms (e.g. wheelchair or bedside chair): A lot  To walk in hospital room: A lot  Climbing 3-5 steps with railing: Total  Basic Mobility - Total Score: 12    Encounter Problems       Encounter Problems (Active)       Balance       STG - Maintains dynamic standing balance with upper extremity support with CGA and whw.        Start:  01/01/24    Expected End:  01/15/24       INTERVENTIONS:  1. Practice standing with minimal support.  2. Educate patient about standing tolerance.  3. Educate patient about independence with gait, transfers, and ADL's.  4. Educate patient about use of assistive device.  5. Educate patient about self-directed care.            Mobility       STG - Patient will ambulate with CGA and whw 25 ft x2.        Start:  01/01/24    Expected End:  01/15/24            As feasibly safe to attempt to simulate home setup, navigate 4 steps with mod Ax1 and 1 rail.        Start:  01/01/24    Expected End:  01/15/24                        Strength       Complete there ex to improve strength and mobility  abilities.       Start:  01/01/24    Expected End:  01/15/24               Transfers       STG - Patient will perform bed mobility CGA.        Start:  01/01/24    Expected End:  01/15/24            STG - Patient will transfer sit to and from stand CGA.        Start:  01/01/24    Expected End:  01/15/24                   Education Documentation  Mobility Training, taught by Silva Estrada, PT at 1/1/2024  2:37 PM.  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

## 2024-01-02 PROBLEM — S72.92XA CLOSED FRACTURE OF LEFT FEMUR (MULTI): Status: ACTIVE | Noted: 2024-01-02

## 2024-01-02 LAB
ALBUMIN SERPL BCP-MCNC: 3 G/DL (ref 3.4–5)
ANION GAP SERPL CALC-SCNC: 8 MMOL/L (ref 10–20)
BUN SERPL-MCNC: 21 MG/DL (ref 6–23)
CALCIUM SERPL-MCNC: 9.1 MG/DL (ref 8.6–10.6)
CHLORIDE SERPL-SCNC: 103 MMOL/L (ref 98–107)
CO2 SERPL-SCNC: 32 MMOL/L (ref 21–32)
CREAT SERPL-MCNC: 0.98 MG/DL (ref 0.5–1.05)
ERYTHROCYTE [DISTWIDTH] IN BLOOD BY AUTOMATED COUNT: 15.1 % (ref 11.5–14.5)
GFR SERPL CREATININE-BSD FRML MDRD: 59 ML/MIN/1.73M*2
GLUCOSE SERPL-MCNC: 83 MG/DL (ref 74–99)
HCT VFR BLD AUTO: 26.8 % (ref 36–46)
HGB BLD-MCNC: 8.3 G/DL (ref 12–16)
HOLD SPECIMEN: NORMAL
MAGNESIUM SERPL-MCNC: 1.81 MG/DL (ref 1.6–2.4)
MCH RBC QN AUTO: 29.5 PG (ref 26–34)
MCHC RBC AUTO-ENTMCNC: 31 G/DL (ref 32–36)
MCV RBC AUTO: 95 FL (ref 80–100)
NRBC BLD-RTO: 0 /100 WBCS (ref 0–0)
PHOSPHATE SERPL-MCNC: 3.7 MG/DL (ref 2.5–4.9)
PLATELET # BLD AUTO: 127 X10*3/UL (ref 150–450)
POTASSIUM SERPL-SCNC: 3.9 MMOL/L (ref 3.5–5.3)
RBC # BLD AUTO: 2.81 X10*6/UL (ref 4–5.2)
SODIUM SERPL-SCNC: 139 MMOL/L (ref 136–145)
WBC # BLD AUTO: 5.2 X10*3/UL (ref 4.4–11.3)

## 2024-01-02 PROCEDURE — 1200000002 HC GENERAL ROOM WITH TELEMETRY DAILY

## 2024-01-02 PROCEDURE — 2500000002 HC RX 250 W HCPCS SELF ADMINISTERED DRUGS (ALT 637 FOR MEDICARE OP, ALT 636 FOR OP/ED)

## 2024-01-02 PROCEDURE — 2500000004 HC RX 250 GENERAL PHARMACY W/ HCPCS (ALT 636 FOR OP/ED)

## 2024-01-02 PROCEDURE — 2500000001 HC RX 250 WO HCPCS SELF ADMINISTERED DRUGS (ALT 637 FOR MEDICARE OP): Performed by: PHYSICAL THERAPIST

## 2024-01-02 PROCEDURE — 2500000001 HC RX 250 WO HCPCS SELF ADMINISTERED DRUGS (ALT 637 FOR MEDICARE OP)

## 2024-01-02 PROCEDURE — 83735 ASSAY OF MAGNESIUM: CPT

## 2024-01-02 PROCEDURE — 99232 SBSQ HOSP IP/OBS MODERATE 35: CPT | Performed by: PODIATRIST

## 2024-01-02 PROCEDURE — 36415 COLL VENOUS BLD VENIPUNCTURE: CPT

## 2024-01-02 PROCEDURE — 2500000004 HC RX 250 GENERAL PHARMACY W/ HCPCS (ALT 636 FOR OP/ED): Performed by: INTERNAL MEDICINE

## 2024-01-02 PROCEDURE — 80069 RENAL FUNCTION PANEL: CPT

## 2024-01-02 PROCEDURE — 85027 COMPLETE CBC AUTOMATED: CPT

## 2024-01-02 PROCEDURE — 96372 THER/PROPH/DIAG INJ SC/IM: CPT

## 2024-01-02 RX ORDER — LOSARTAN POTASSIUM 25 MG/1
25 TABLET ORAL DAILY
Status: DISCONTINUED | OUTPATIENT
Start: 2024-01-02 | End: 2024-01-04 | Stop reason: HOSPADM

## 2024-01-02 RX ORDER — ACETAMINOPHEN 325 MG/1
975 TABLET ORAL EVERY 8 HOURS
Status: DISCONTINUED | OUTPATIENT
Start: 2024-01-02 | End: 2024-01-04 | Stop reason: HOSPADM

## 2024-01-02 RX ADMIN — BUSPIRONE HYDROCHLORIDE 10 MG: 10 TABLET ORAL at 09:25

## 2024-01-02 RX ADMIN — Medication 1 TABLET: at 09:25

## 2024-01-02 RX ADMIN — Medication 1 TABLET: at 20:30

## 2024-01-02 RX ADMIN — Medication 5 MG: at 20:30

## 2024-01-02 RX ADMIN — ENOXAPARIN SODIUM 40 MG: 100 INJECTION SUBCUTANEOUS at 09:24

## 2024-01-02 RX ADMIN — ACETAMINOPHEN 975 MG: 325 TABLET ORAL at 20:29

## 2024-01-02 RX ADMIN — ACETAMINOPHEN 975 MG: 325 TABLET ORAL at 15:33

## 2024-01-02 RX ADMIN — BUSPIRONE HYDROCHLORIDE 10 MG: 10 TABLET ORAL at 20:30

## 2024-01-02 RX ADMIN — QUETIAPINE FUMARATE 25 MG: 25 TABLET ORAL at 20:30

## 2024-01-02 RX ADMIN — ATORVASTATIN CALCIUM 20 MG: 20 TABLET, FILM COATED ORAL at 20:30

## 2024-01-02 RX ADMIN — POLYETHYLENE GLYCOL 3350 17 G: 17 POWDER, FOR SOLUTION ORAL at 09:25

## 2024-01-02 RX ADMIN — DULOXETINE HYDROCHLORIDE 60 MG: 60 CAPSULE, DELAYED RELEASE ORAL at 09:25

## 2024-01-02 RX ADMIN — BUSPIRONE HYDROCHLORIDE 10 MG: 10 TABLET ORAL at 15:33

## 2024-01-02 ASSESSMENT — COGNITIVE AND FUNCTIONAL STATUS - GENERAL
DRESSING REGULAR LOWER BODY CLOTHING: A LOT
PERSONAL GROOMING: A LITTLE
DAILY ACTIVITIY SCORE: 14
WALKING IN HOSPITAL ROOM: TOTAL
CLIMB 3 TO 5 STEPS WITH RAILING: TOTAL
HELP NEEDED FOR BATHING: A LOT
MOVING TO AND FROM BED TO CHAIR: A LOT
TOILETING: TOTAL
MOVING FROM LYING ON BACK TO SITTING ON SIDE OF FLAT BED WITH BEDRAILS: A LOT
STANDING UP FROM CHAIR USING ARMS: A LOT
MOBILITY SCORE: 10
TURNING FROM BACK TO SIDE WHILE IN FLAT BAD: A LOT
DRESSING REGULAR UPPER BODY CLOTHING: A LOT

## 2024-01-02 ASSESSMENT — PAIN SCALES - GENERAL
PAINLEVEL_OUTOF10: 0 - NO PAIN
PAINLEVEL_OUTOF10: 0 - NO PAIN

## 2024-01-02 NOTE — NURSING NOTE
RN heard pt yelling at the top of her lungs. When RN went in to check on pt she was sitting at edge of bed attempting to stand. Pt became very agitated and aggressive when RN tried to redirect pt back to bed. Pt began yelling at and threatening RN while talking to and about her hallucinations of family members. Per MD Arce RN gave IM zyprexa early. Pt still angry when spoken to but now resting comfortably in bed, alrms on will continue to monitor.

## 2024-01-02 NOTE — PROGRESS NOTES
Transitional Care Coordinator Progress Note:   Pt is medically ready for discharge to SNF.  FOC is [facility], updated clinicals and PT note sent for review, await acceptance.  Gold form requested. Pt does not need a precert.  Care coordinator will continue to follow for discharge planning needs.     Kimberly Guthrie MSN, RN-BC  Transitional Care Coordinator (TCC)  [phone]

## 2024-01-02 NOTE — PROGRESS NOTES
"Adriana Duran is a 78 y.o. female on day 4 of admission presenting with Cari-prosthetic supracondylar fracture of femur.    Subjective   Patient doing well. Denies any complaints or concerns. Denies pain.        Objective     Physical Exam  Constitutional:       Appearance: Normal appearance.   HENT:      Head: Normocephalic and atraumatic.   Cardiovascular:      Rate and Rhythm: Normal rate and regular rhythm.      Heart sounds: Normal heart sounds.   Pulmonary:      Effort: Pulmonary effort is normal. No respiratory distress.      Breath sounds: Normal breath sounds. No wheezing.   Abdominal:      General: Bowel sounds are normal. There is no distension.      Palpations: Abdomen is soft.      Tenderness: There is no abdominal tenderness.   Musculoskeletal:      Comments: Surgical dressing intact to Left hip/thigh. No strike-through noted.    Skin:     General: Skin is warm and dry.   Neurological:      General: No focal deficit present.      Mental Status: She is alert.      Comments: A&O x1 (self).    Psychiatric:         Mood and Affect: Mood normal.         Behavior: Behavior normal.         Last Recorded Vitals  Blood pressure (!) 110/48, pulse 72, temperature 36.4 °C (97.5 °F), resp. rate 17, height 1.6 m (5' 2.99\"), weight 78.9 kg (174 lb), SpO2 94 %.  Intake/Output last 3 Shifts:  I/O last 3 completed shifts:  In: 250 (3.2 mL/kg) [IV Piggyback:250]  Out: 500 (6.3 mL/kg) [Urine:500 (0.2 mL/kg/hr)]  Weight: 78.9 kg     Relevant Results  Scheduled medications  atorvastatin, 20 mg, oral, Nightly  busPIRone, 10 mg, oral, TID  calcium carbonate-vitamin D3, 1 tablet, oral, BID  DULoxetine, 60 mg, oral, Daily  enoxaparin, 40 mg, subcutaneous, Daily  [Held by provider] losartan, 100 mg, oral, Daily  melatonin, 5 mg, oral, Nightly  polyethylene glycol, 17 g, oral, Daily      Continuous medications  [Held by provider] D5 % and 0.9 % sodium chloride, 125 mL/hr      PRN medications  PRN medications: acetaminophen, " OLANZapine, oxyCODONE, QUEtiapine                Assessment/Plan   Principal Problem:    Cari-prosthetic supracondylar fracture of femur  Active Problems:    Periprosthetic supracondylar fracture of femur, initial encounter      Adriana Duran is a 78 year old F with a PMHx of HTN, DM 2 (not on meds), HLD, anxiety and depression, previous L TKA and ORIF to left hip who presented to the ED as a transfer from OSH for definitive treatment of a displaced left femoral diaphysis fracture.  Orthopedics took patient to the OR and patient underwent surgical intervention on 12/29/23 for L femoral shaft fracture percutaneous assisted reduction and retrograde intramedullary nailing. Patient admitted to Hospitalist Team D for further medical management. Pending dispo to SNF.      Interprosthetic left femur diaphysis fracture   S/p Left femoral shaft fracture percutaneous assisted reduction and retrograde intramedullary nailing and Left femoral shaft prophylactic plating on 12/29/23  :: mechanical fall from standing   - Orthopedics consulted. Appreciate recs  ::: WBAT LLE  - Pain control:    - Scheduled Tylenol 975 mg q8h   - Continue Oxy 5 mg q4h  PRN for severe pain  - Discontinued Oxy 10 mg for severe pain   - OT rec mod intensity     Fever - resolved   - No overnight fevers recorded   - UA culture negative  - Discontinued ceftriaxone     Delirium at night  -Baseline delirium per   -Olanzapine 2.5 mg q6hrs PRN agitation     HTN   - Started losartan 25 mg daily   - Holding home losartan 100 mg every day      History of DMII  :: HbA1c of 5.7 on 2/2023  :: not on DM meds      HLD   - Continue home atorvastatin 20 mg      MDD  Anxiety   - Continue home meds: Cymbalta 60 mg and buspar 10 mg TID  - Started Seroquel 12.5. mg BID PRN anxiety yesterday   - Off home citalopram 40 mg as worry Serotonin Syndrome with SSRI and SNRI     Dispo  - Pending [facility]- pending acceptance      F: none  E: replete PRN   N: regular  diet  GI ppx: not indicated   DVT ppx: SCDs; lovenox 40 mg daily       Patient dicussed with attending, Dr. Lowe.      Plan preliminary until cosigned by attending physician.      Nancie Jeffrey MD   PGY1, Family Medicine   [facility]  Available by [employer]

## 2024-01-02 NOTE — CARE PLAN
The patient's goals for the shift include  getting out of here.    The clinical goals for the shift include Pt. will remain hemodynamically stable for duration of shift.    Over the shift, the patient made progress towards all goals.

## 2024-01-03 LAB
ALBUMIN SERPL BCP-MCNC: 3.2 G/DL (ref 3.4–5)
ANION GAP SERPL CALC-SCNC: 16 MMOL/L (ref 10–20)
BUN SERPL-MCNC: 28 MG/DL (ref 6–23)
CALCIUM SERPL-MCNC: 9 MG/DL (ref 8.6–10.6)
CHLORIDE SERPL-SCNC: 103 MMOL/L (ref 98–107)
CO2 SERPL-SCNC: 24 MMOL/L (ref 21–32)
CREAT SERPL-MCNC: 1.03 MG/DL (ref 0.5–1.05)
ERYTHROCYTE [DISTWIDTH] IN BLOOD BY AUTOMATED COUNT: 15 % (ref 11.5–14.5)
GFR SERPL CREATININE-BSD FRML MDRD: 55 ML/MIN/1.73M*2
GLUCOSE SERPL-MCNC: 122 MG/DL (ref 74–99)
HCT VFR BLD AUTO: 27.8 % (ref 36–46)
HGB BLD-MCNC: 8.5 G/DL (ref 12–16)
MAGNESIUM SERPL-MCNC: 1.88 MG/DL (ref 1.6–2.4)
MCH RBC QN AUTO: 29.2 PG (ref 26–34)
MCHC RBC AUTO-ENTMCNC: 30.6 G/DL (ref 32–36)
MCV RBC AUTO: 96 FL (ref 80–100)
NRBC BLD-RTO: 0 /100 WBCS (ref 0–0)
PHOSPHATE SERPL-MCNC: 3.6 MG/DL (ref 2.5–4.9)
PLATELET # BLD AUTO: 149 X10*3/UL (ref 150–450)
POTASSIUM SERPL-SCNC: 3.8 MMOL/L (ref 3.5–5.3)
RBC # BLD AUTO: 2.91 X10*6/UL (ref 4–5.2)
SODIUM SERPL-SCNC: 139 MMOL/L (ref 136–145)
WBC # BLD AUTO: 5.4 X10*3/UL (ref 4.4–11.3)

## 2024-01-03 PROCEDURE — 97110 THERAPEUTIC EXERCISES: CPT | Mod: GP,CQ

## 2024-01-03 PROCEDURE — 2500000004 HC RX 250 GENERAL PHARMACY W/ HCPCS (ALT 636 FOR OP/ED)

## 2024-01-03 PROCEDURE — 2500000001 HC RX 250 WO HCPCS SELF ADMINISTERED DRUGS (ALT 637 FOR MEDICARE OP)

## 2024-01-03 PROCEDURE — 80069 RENAL FUNCTION PANEL: CPT

## 2024-01-03 PROCEDURE — 97530 THERAPEUTIC ACTIVITIES: CPT | Mod: GP,CQ

## 2024-01-03 PROCEDURE — 2500000004 HC RX 250 GENERAL PHARMACY W/ HCPCS (ALT 636 FOR OP/ED): Performed by: INTERNAL MEDICINE

## 2024-01-03 PROCEDURE — 2500000001 HC RX 250 WO HCPCS SELF ADMINISTERED DRUGS (ALT 637 FOR MEDICARE OP): Performed by: PHYSICAL THERAPIST

## 2024-01-03 PROCEDURE — 2500000001 HC RX 250 WO HCPCS SELF ADMINISTERED DRUGS (ALT 637 FOR MEDICARE OP): Performed by: PODIATRIST

## 2024-01-03 PROCEDURE — 96372 THER/PROPH/DIAG INJ SC/IM: CPT

## 2024-01-03 PROCEDURE — 83735 ASSAY OF MAGNESIUM: CPT

## 2024-01-03 PROCEDURE — 1200000002 HC GENERAL ROOM WITH TELEMETRY DAILY

## 2024-01-03 PROCEDURE — 85027 COMPLETE CBC AUTOMATED: CPT

## 2024-01-03 PROCEDURE — 99232 SBSQ HOSP IP/OBS MODERATE 35: CPT | Performed by: PODIATRIST

## 2024-01-03 PROCEDURE — 2500000002 HC RX 250 W HCPCS SELF ADMINISTERED DRUGS (ALT 637 FOR MEDICARE OP, ALT 636 FOR OP/ED)

## 2024-01-03 PROCEDURE — 36415 COLL VENOUS BLD VENIPUNCTURE: CPT

## 2024-01-03 RX ADMIN — Medication 1 TABLET: at 20:10

## 2024-01-03 RX ADMIN — ACETAMINOPHEN 975 MG: 325 TABLET ORAL at 05:15

## 2024-01-03 RX ADMIN — DULOXETINE HYDROCHLORIDE 60 MG: 60 CAPSULE, DELAYED RELEASE ORAL at 09:06

## 2024-01-03 RX ADMIN — BUSPIRONE HYDROCHLORIDE 10 MG: 10 TABLET ORAL at 09:06

## 2024-01-03 RX ADMIN — BUSPIRONE HYDROCHLORIDE 10 MG: 10 TABLET ORAL at 20:10

## 2024-01-03 RX ADMIN — BUSPIRONE HYDROCHLORIDE 10 MG: 10 TABLET ORAL at 16:43

## 2024-01-03 RX ADMIN — ENOXAPARIN SODIUM 40 MG: 100 INJECTION SUBCUTANEOUS at 09:06

## 2024-01-03 RX ADMIN — ATORVASTATIN CALCIUM 20 MG: 20 TABLET, FILM COATED ORAL at 20:10

## 2024-01-03 RX ADMIN — LOSARTAN POTASSIUM 25 MG: 25 TABLET, FILM COATED ORAL at 09:06

## 2024-01-03 RX ADMIN — ACETAMINOPHEN 975 MG: 325 TABLET ORAL at 13:52

## 2024-01-03 RX ADMIN — Medication 1 TABLET: at 09:05

## 2024-01-03 RX ADMIN — POLYETHYLENE GLYCOL 3350 17 G: 17 POWDER, FOR SOLUTION ORAL at 09:06

## 2024-01-03 RX ADMIN — Medication 5 MG: at 20:10

## 2024-01-03 RX ADMIN — OXYCODONE HYDROCHLORIDE 5 MG: 5 TABLET ORAL at 09:05

## 2024-01-03 ASSESSMENT — PAIN SCALES - GENERAL
PAINLEVEL_OUTOF10: 10 - WORST POSSIBLE PAIN
PAINLEVEL_OUTOF10: 0 - NO PAIN
PAINLEVEL_OUTOF10: 10 - WORST POSSIBLE PAIN
PAINLEVEL_OUTOF10: 7
PAINLEVEL_OUTOF10: 2
PAINLEVEL_OUTOF10: 0 - NO PAIN

## 2024-01-03 ASSESSMENT — COGNITIVE AND FUNCTIONAL STATUS - GENERAL
CLIMB 3 TO 5 STEPS WITH RAILING: TOTAL
EATING MEALS: A LITTLE
HELP NEEDED FOR BATHING: A LOT
MOBILITY SCORE: 9
TURNING FROM BACK TO SIDE WHILE IN FLAT BAD: A LOT
DRESSING REGULAR UPPER BODY CLOTHING: A LOT
MOVING TO AND FROM BED TO CHAIR: TOTAL
CLIMB 3 TO 5 STEPS WITH RAILING: TOTAL
MOBILITY SCORE: 9
DAILY ACTIVITIY SCORE: 13
MOVING FROM LYING ON BACK TO SITTING ON SIDE OF FLAT BED WITH BEDRAILS: A LOT
WALKING IN HOSPITAL ROOM: TOTAL
MOVING TO AND FROM BED TO CHAIR: A LOT
STANDING UP FROM CHAIR USING ARMS: TOTAL
WALKING IN HOSPITAL ROOM: TOTAL
STANDING UP FROM CHAIR USING ARMS: A LOT
DRESSING REGULAR LOWER BODY CLOTHING: A LOT
MOVING FROM LYING ON BACK TO SITTING ON SIDE OF FLAT BED WITH BEDRAILS: A LOT
TURNING FROM BACK TO SIDE WHILE IN FLAT BAD: A LOT
PERSONAL GROOMING: A LOT
TOILETING: A LOT

## 2024-01-03 ASSESSMENT — PAIN - FUNCTIONAL ASSESSMENT
PAIN_FUNCTIONAL_ASSESSMENT: 0-10

## 2024-01-03 ASSESSMENT — PAIN DESCRIPTION - ORIENTATION
ORIENTATION: LEFT
ORIENTATION: LEFT

## 2024-01-03 ASSESSMENT — PAIN DESCRIPTION - LOCATION
LOCATION: LEG
LOCATION: LEG

## 2024-01-03 NOTE — SIGNIFICANT EVENT
Rapid Response RN Note     01/03/24 0002   Onset Documentation   Rapid Response Initiated By Radar auto page   Location/Room [facility]  (LK [id number])   Pager Time [phone]   Arrival Time 0002   Event End Time 0010   Primary Reason for Call Radar auto page  (RADAR 6)     Rapid response RN at bedside for RADAR score 6 due to the following VS: T 36.3 °Celsius; HR 78 ; RR 16; BP 82/45; SPO2 91% on room air.     Reviewed above VS with bedside RN.  Patient awake and confused.  VS at 0004: T 36.4 °Celsius; HR 82 ; /51 (69); SPO2 93% on room air.  Patient denied pain, shortness of breath, dizziness or lightheadedness.  No interventions by rapid response team indicated at this time.      Staff to page rapid response for any concerns or acute change in condition/VS.

## 2024-01-03 NOTE — PROGRESS NOTES
Physical Therapy    Physical Therapy Treatment    Patient Name: Adriana Duran  MRN: [id number]  Today's Date: 1/3/2024  Time Calculation  Start Time: 1353  Stop Time: 1421  Time Calculation (min): 28 min       Assessment/Plan   PT Assessment  PT Assessment Results: Decreased strength, Decreased endurance, Impaired balance, Decreased mobility, Decreased cognition  End of Session Communication: Bedside nurse  Assessment Comment: Will benefit from continued skilled PT to address all mobility deficits.  End of Session Patient Position: Bed, 3 rail up, Alarm on     PT Plan  Treatment/Interventions: Bed mobility, Transfer training, Gait training, Balance training, Strengthening, Therapeutic exercise  PT Plan: Skilled PT  PT Frequency: 5 times per week  PT Discharge Recommendations: Moderate intensity level of continued care  PT Recommended Transfer Status: Assist x2  PT - OK to Discharge: Yes (POC/goals/discharge rec created)      General Visit Information:   PT  Visit  PT Received On: 01/03/24  General  Prior to Session Communication: Bedside nurse  Patient Position Received: Bed, 3 rail up, Alarm off, not on at start of session  General Comment: Pt supine in bed upon arrival. RN in room administered pain medication. Pt pleasantly confused, agreeable to therapy. Pt demos confusion during session though easily redirected to task at hand.    Subjective   Precautions:  Precautions  LE Weight Bearing Status: Weight Bearing as Tolerated  Medical Precautions: Fall precautions  Vital Signs:  Vital Signs  Heart Rate: 92  Heart Rate Source: Monitor  BP: 108/64  BP Location: Left arm  BP Method: Automatic  Patient Position: Sitting    Objective   Pain:  Pain Assessment  Pain Assessment:  (Pt did not rate)  Pain Type: Acute pain  Pain Location: Leg  Pain Orientation: Left  Pain Interventions: Medication (See MAR) (RN administered pain medication prior to treatment.)  Cognition:       Activity Tolerance:  Activity  Tolerance  Endurance: Tolerates 10 - 20 min exercise with multiple rests  Treatments:  Therapeutic Exercise  Therapeutic Exercise Performed: Yes  Therapeutic Exercise Activity 1: seated B LE AROM hip flexion, DF, heel raises, hip adduction x10  Therapeutic Exercise Activity 2: seated R LE AROM LAQ x10, unable with L LE due to increased pain.    Therapeutic Activity  Therapeutic Activity Performed: Yes  Therapeutic Activity 1: Pt sat EOB statically for approx. 10 min using B UE support with SBA and no LOB.  Therapeutic Activity 2: Pt stood statically for approx. 5-10 seconds x3 with FWW and Max A.    Bed Mobility  Bed Mobility: Yes  Bed Mobility 1  Bed Mobility 1: Supine to sitting  Level of Assistance 1: Minimum assistance  Bed Mobility Comments 1: HOB raised, use of bed rails.  Bed Mobility 2  Bed Mobility  2: Sitting to supine  Level of Assistance 2: Minimum assistance  Bed Mobility 3  Bed Mobility 3: Scooting  Level of Assistance 3: Close supervision  Bed Mobility Comments 3: laterally toward HOB.    Ambulation/Gait Training  Ambulation/Gait Training Performed: No (Pt unable to due to increased pain in L LE and instability with standing.)  Transfers  Transfer: Yes  Transfer 1  Transfer From 1: Sit to  Transfer to 1: Stand  Technique 1: Sit to stand  Transfer Device 1: Walker  Transfer Level of Assistance 1: Maximum assistance  Trials/Comments 1: x3 cues for trunk positioning and proper B LE placement.  Transfers 2  Transfer From 2: Stand to  Transfer to 2: Sit  Technique 2: Stand to sit  Transfer Device 2: Walker  Transfer Level of Assistance 2: Maximum assistance    Outcome Measures:  [facility] Basic Mobility  Turning from your back to your side while in a flat bed without using bedrails: A lot  Moving from lying on your back to sitting on the side of a flat bed without using bedrails: A lot  Moving to and from bed to chair (including a wheelchair): Total  Standing up from a chair using your arms (e.g. wheelchair or  bedside chair): A lot  To walk in hospital room: Total  Climbing 3-5 steps with railing: Total  Basic Mobility - Total Score: 9    Education Documentation  Mobility Training, taught by Velvet Wallace PTA at 1/3/2024  3:09 PM.  Learner: Patient  Readiness: Acceptance  Method: Explanation  Response: Verbalizes Understanding, Needs Reinforcement    Education Comments  No comments found.        OP EDUCATION:       Encounter Problems       Encounter Problems (Active)       Balance       STG - Maintains dynamic standing balance with upper extremity support with CGA and whw.  (Progressing)       Start:  01/01/24    Expected End:  01/15/24       INTERVENTIONS:  1. Practice standing with minimal support.  2. Educate patient about standing tolerance.  3. Educate patient about independence with gait, transfers, and ADL's.  4. Educate patient about use of assistive device.  5. Educate patient about self-directed care.            Mobility       STG - Patient will ambulate with CGA and whw 25 ft x2.  (Progressing)       Start:  01/01/24    Expected End:  01/15/24            As feasibly safe to attempt to simulate home setup, navigate 4 steps with mod Ax1 and 1 rail.  (Progressing)       Start:  01/01/24    Expected End:  01/15/24               Pain - Adult          Strength       Complete there ex to improve strength and mobility abilities. (Progressing)       Start:  01/01/24    Expected End:  01/15/24               Transfers       STG - Patient will perform bed mobility CGA.  (Progressing)       Start:  01/01/24    Expected End:  01/15/24            STG - Patient will transfer sit to and from stand CGA.  (Progressing)       Start:  01/01/24    Expected End:  01/15/24

## 2024-01-03 NOTE — CARE PLAN
Problem: Pain - Adult  Goal: Verbalizes/displays adequate comfort level or baseline comfort level  Outcome: Progressing     Problem: Skin  Goal: Decreased wound size/increased tissue granulation at next dressing change  Outcome: Progressing  Goal: Promote skin healing  Outcome: Progressing  Flowsheets (Taken 1/3/2024 1227)  Promote skin healing:   Assess skin/pad under line(s)/device(s)   Turn/reposition every 2 hours/use positioning/transfer devices   Rotate device position/do not position patient on device   The patient's goals for the shift include      The clinical goals for the shift include Patients pain will be well controlled during this shift

## 2024-01-03 NOTE — PROGRESS NOTES
"Adriana Duran is a 78 y.o. female on day 5 of admission presenting with Cari-prosthetic supracondylar fracture of femur.    Subjective   Patient is doing well. Has some hip pain. Denies any other complaints.        Objective     Physical Exam  Constitutional:       Appearance: Normal appearance.   HENT:      Head: Normocephalic and atraumatic.   Cardiovascular:      Rate and Rhythm: Normal rate and regular rhythm.      Heart sounds: Normal heart sounds.   Pulmonary:      Effort: Pulmonary effort is normal. No respiratory distress.      Breath sounds: Normal breath sounds. No wheezing.   Abdominal:      General: Bowel sounds are normal. There is no distension.      Palpations: Abdomen is soft.      Tenderness: There is no abdominal tenderness.   Musculoskeletal:      Right lower leg: No edema.      Left lower leg: No edema.      Comments: Surgical dressing intact to Left thigh.    Skin:     General: Skin is warm and dry.   Neurological:      General: No focal deficit present.      Mental Status: She is alert.   Psychiatric:         Mood and Affect: Mood normal.         Behavior: Behavior normal.         Last Recorded Vitals  Blood pressure 108/64, pulse 92, temperature 36.2 °C (97.2 °F), resp. rate 16, height 1.6 m (5' 2.99\"), weight 78.9 kg (174 lb), SpO2 95 %.  Intake/Output last 3 Shifts:  I/O last 3 completed shifts:  In: - (0 mL/kg)   Out: 500 (6.3 mL/kg) [Urine:500 (0.2 mL/kg/hr)]  Weight: 78.9 kg     Relevant Results    Scheduled medications  acetaminophen, 975 mg, oral, q8h  atorvastatin, 20 mg, oral, Nightly  busPIRone, 10 mg, oral, TID  calcium carbonate-vitamin D3, 1 tablet, oral, BID  DULoxetine, 60 mg, oral, Daily  enoxaparin, 40 mg, subcutaneous, Daily  [Held by provider] losartan, 100 mg, oral, Daily  losartan, 25 mg, oral, Daily  melatonin, 5 mg, oral, Nightly  polyethylene glycol, 17 g, oral, Daily      Continuous medications  [Held by provider] D5 % and 0.9 % sodium chloride, 125 mL/hr      PRN " medications  PRN medications: OLANZapine, oxyCODONE, QUEtiapine       Assessment/Plan   Principal Problem:    Cari-prosthetic supracondylar fracture of femur  Active Problems:    Periprosthetic supracondylar fracture of femur, initial encounter    Closed fracture of left femur (CMS/HCC)        Adriana Duran is a 78 year old F with a PMHx of HTN, DM 2 (not on meds), HLD, anxiety and depression, previous L TKA and ORIF to left hip who presented to the ED as a transfer from OSH for definitive treatment of a displaced left femoral diaphysis fracture.  Orthopedics took patient to the OR and patient underwent surgical intervention on 12/29/23 for L femoral shaft fracture percutaneous assisted reduction and retrograde intramedullary nailing. Patient admitted to Hospitalist Team D for further medical management. Pending dispo to SNF.      Interprosthetic left femur diaphysis fracture   S/p Left femoral shaft fracture percutaneous assisted reduction and retrograde intramedullary nailing and Left femoral shaft prophylactic plating on 12/29/23  :: mechanical fall from standing   - Orthopedics consulted. Appreciate recs  ::: WBAT LLE  - Pain control:    - Tylenol 975 mg q8h  sched  - Continue Oxy 5 mg q4h  PRN for severe pain  - Discontinued Oxy 10 mg for severe pain   - OT rec mod intensity      Delirium at night  -Baseline delirium per   -Olanzapine 2.5 mg q6hrs PRN agitation     HTN   - Started losartan 25 mg daily   - Holding home losartan 100 mg daily      History of DMII  :: HbA1c of 5.7 on 2/2023  :: not on DM meds      HLD   - Continue home atorvastatin 20 mg daily      MDD  Anxiety   - Continue home meds: Cymbalta 60 mg and buspar 10 mg TID  - Started Seroquel 12.5. mg BID PRN anxiety yesterday   - Off home citalopram 40 mg as worry Serotonin Syndrome with SSRI and SNRI     Dispo  - Pending SNF- previous facility unable to take patient.      F: none  E: replete PRN   N: regular diet  GI ppx: not indicated    DVT ppx: SCDs; lovenox 40 mg daily       Patient dicussed with attending, Dr. Lowe.      Plan preliminary until cosigned by attending physician.      Nancie Jeffrey MD   PGY1, Family Medicine   [facility]  Available by [employer]

## 2024-01-04 VITALS
SYSTOLIC BLOOD PRESSURE: 97 MMHG | WEIGHT: 174 LBS | HEIGHT: 63 IN | BODY MASS INDEX: 30.83 KG/M2 | DIASTOLIC BLOOD PRESSURE: 60 MMHG | RESPIRATION RATE: 18 BRPM | TEMPERATURE: 97.5 F | OXYGEN SATURATION: 98 % | HEART RATE: 79 BPM

## 2024-01-04 PROBLEM — Z96.659 PERI-PROSTHETIC SUPRACONDYLAR FRACTURE OF FEMUR: Status: RESOLVED | Noted: 2023-12-28 | Resolved: 2024-01-04

## 2024-01-04 PROBLEM — M97.8XXA PERI-PROSTHETIC SUPRACONDYLAR FRACTURE OF FEMUR: Status: RESOLVED | Noted: 2023-12-28 | Resolved: 2024-01-04

## 2024-01-04 PROBLEM — M97.8XXA: Status: RESOLVED | Noted: 2023-12-29 | Resolved: 2024-01-04

## 2024-01-04 PROBLEM — Z96.659: Status: RESOLVED | Noted: 2023-12-29 | Resolved: 2024-01-04

## 2024-01-04 PROBLEM — S72.92XA CLOSED FRACTURE OF LEFT FEMUR (MULTI): Status: RESOLVED | Noted: 2024-01-02 | Resolved: 2024-01-04

## 2024-01-04 LAB — GLUCOSE BLD MANUAL STRIP-MCNC: 112 MG/DL (ref 74–99)

## 2024-01-04 PROCEDURE — 96372 THER/PROPH/DIAG INJ SC/IM: CPT

## 2024-01-04 PROCEDURE — 2500000004 HC RX 250 GENERAL PHARMACY W/ HCPCS (ALT 636 FOR OP/ED)

## 2024-01-04 PROCEDURE — 2500000002 HC RX 250 W HCPCS SELF ADMINISTERED DRUGS (ALT 637 FOR MEDICARE OP, ALT 636 FOR OP/ED)

## 2024-01-04 PROCEDURE — 2500000001 HC RX 250 WO HCPCS SELF ADMINISTERED DRUGS (ALT 637 FOR MEDICARE OP)

## 2024-01-04 PROCEDURE — 2500000001 HC RX 250 WO HCPCS SELF ADMINISTERED DRUGS (ALT 637 FOR MEDICARE OP): Performed by: PHYSICAL THERAPIST

## 2024-01-04 PROCEDURE — 97116 GAIT TRAINING THERAPY: CPT | Mod: GP,CQ

## 2024-01-04 PROCEDURE — 2500000001 HC RX 250 WO HCPCS SELF ADMINISTERED DRUGS (ALT 637 FOR MEDICARE OP): Performed by: PODIATRIST

## 2024-01-04 PROCEDURE — 99239 HOSP IP/OBS DSCHRG MGMT >30: CPT | Performed by: PODIATRIST

## 2024-01-04 PROCEDURE — 82947 ASSAY GLUCOSE BLOOD QUANT: CPT

## 2024-01-04 PROCEDURE — 97110 THERAPEUTIC EXERCISES: CPT | Mod: GP,CQ

## 2024-01-04 RX ORDER — QUETIAPINE FUMARATE 25 MG/1
25 TABLET, FILM COATED ORAL 2 TIMES DAILY PRN
Start: 2024-01-04 | End: 2024-05-01 | Stop reason: WASHOUT

## 2024-01-04 RX ORDER — ASPIRIN 81 MG/1
81 TABLET ORAL 2 TIMES DAILY
Qty: 84 TABLET | Refills: 0
Start: 2024-01-04 | End: 2024-02-15

## 2024-01-04 RX ORDER — OXYCODONE HYDROCHLORIDE 5 MG/1
5 TABLET ORAL EVERY 4 HOURS PRN
Qty: 15 TABLET | Refills: 0 | Status: SHIPPED | OUTPATIENT
Start: 2024-01-04 | End: 2024-01-07

## 2024-01-04 RX ORDER — FERROUS SULFATE, DRIED 160(50) MG
1 TABLET, EXTENDED RELEASE ORAL 2 TIMES DAILY
Start: 2024-01-04 | End: 2024-01-04 | Stop reason: HOSPADM

## 2024-01-04 RX ORDER — QUETIAPINE FUMARATE 25 MG/1
25 TABLET, FILM COATED ORAL 2 TIMES DAILY
Qty: 60 TABLET | Refills: 1
Start: 2024-01-04 | End: 2024-01-04 | Stop reason: SDUPTHER

## 2024-01-04 RX ADMIN — Medication 1 TABLET: at 08:32

## 2024-01-04 RX ADMIN — LOSARTAN POTASSIUM 25 MG: 25 TABLET, FILM COATED ORAL at 08:35

## 2024-01-04 RX ADMIN — ENOXAPARIN SODIUM 40 MG: 100 INJECTION SUBCUTANEOUS at 08:32

## 2024-01-04 RX ADMIN — BUSPIRONE HYDROCHLORIDE 10 MG: 10 TABLET ORAL at 15:20

## 2024-01-04 RX ADMIN — DULOXETINE HYDROCHLORIDE 60 MG: 60 CAPSULE, DELAYED RELEASE ORAL at 08:32

## 2024-01-04 RX ADMIN — ACETAMINOPHEN 975 MG: 325 TABLET ORAL at 12:45

## 2024-01-04 RX ADMIN — BUSPIRONE HYDROCHLORIDE 10 MG: 10 TABLET ORAL at 08:32

## 2024-01-04 RX ADMIN — POLYETHYLENE GLYCOL 3350 17 G: 17 POWDER, FOR SOLUTION ORAL at 08:32

## 2024-01-04 ASSESSMENT — COGNITIVE AND FUNCTIONAL STATUS - GENERAL
MOBILITY SCORE: 10
HELP NEEDED FOR BATHING: A LOT
TOILETING: A LOT
WALKING IN HOSPITAL ROOM: TOTAL
STANDING UP FROM CHAIR USING ARMS: A LOT
PERSONAL GROOMING: A LOT
DAILY ACTIVITIY SCORE: 13
MOBILITY SCORE: 9
MOVING FROM LYING ON BACK TO SITTING ON SIDE OF FLAT BED WITH BEDRAILS: A LOT
DRESSING REGULAR LOWER BODY CLOTHING: A LOT
MOVING TO AND FROM BED TO CHAIR: A LOT
CLIMB 3 TO 5 STEPS WITH RAILING: TOTAL
STANDING UP FROM CHAIR USING ARMS: TOTAL
TURNING FROM BACK TO SIDE WHILE IN FLAT BAD: A LOT
WALKING IN HOSPITAL ROOM: TOTAL
MOVING FROM LYING ON BACK TO SITTING ON SIDE OF FLAT BED WITH BEDRAILS: A LOT
CLIMB 3 TO 5 STEPS WITH RAILING: TOTAL
EATING MEALS: A LITTLE
DRESSING REGULAR UPPER BODY CLOTHING: A LOT
TURNING FROM BACK TO SIDE WHILE IN FLAT BAD: A LOT
MOVING TO AND FROM BED TO CHAIR: A LOT

## 2024-01-04 ASSESSMENT — PAIN - FUNCTIONAL ASSESSMENT
PAIN_FUNCTIONAL_ASSESSMENT: 0-10

## 2024-01-04 ASSESSMENT — PAIN SCALES - GENERAL
PAINLEVEL_OUTOF10: 0 - NO PAIN
PAINLEVEL_OUTOF10: 2
PAINLEVEL_OUTOF10: 0 - NO PAIN

## 2024-01-04 ASSESSMENT — PAIN DESCRIPTION - LOCATION: LOCATION: LEG

## 2024-01-04 ASSESSMENT — PAIN DESCRIPTION - ORIENTATION: ORIENTATION: LEFT

## 2024-01-04 NOTE — PROGRESS NOTES
"Adriana Duran is a 78 y.o. female on day 6 of admission presenting with Cari-prosthetic supracondylar fracture of femur.    Subjective   Patient says she is doing well. She denies any pain. She denies fever, chills, nausea and vomiting.        Objective     Physical Exam  Constitutional:       Appearance: Normal appearance.   HENT:      Head: Normocephalic and atraumatic.   Cardiovascular:      Rate and Rhythm: Normal rate and regular rhythm.      Pulses: Normal pulses.      Heart sounds: Normal heart sounds.   Pulmonary:      Effort: Pulmonary effort is normal. No respiratory distress.      Breath sounds: Normal breath sounds.   Abdominal:      General: Bowel sounds are normal.      Palpations: Abdomen is soft.      Tenderness: There is no abdominal tenderness. There is no guarding.   Musculoskeletal:      Right lower leg: No edema.      Left lower leg: No edema.   Skin:     General: Skin is warm.   Neurological:      General: No focal deficit present.      Mental Status: She is alert. Mental status is at baseline.   Psychiatric:         Mood and Affect: Mood normal.         Behavior: Behavior normal.         Last Recorded Vitals  Blood pressure 120/64, pulse 80, temperature 36 °C (96.8 °F), resp. rate 18, height 1.6 m (5' 2.99\"), weight 78.9 kg (174 lb), SpO2 96 %.  Intake/Output last 3 Shifts:  I/O last 3 completed shifts:  In: - (0 mL/kg)   Out: 300 (3.8 mL/kg) [Urine:300 (0.1 mL/kg/hr)]  Weight: 78.9 kg     Relevant Results    Scheduled medications  acetaminophen, 975 mg, oral, q8h  atorvastatin, 20 mg, oral, Nightly  busPIRone, 10 mg, oral, TID  calcium carbonate-vitamin D3, 1 tablet, oral, BID  DULoxetine, 60 mg, oral, Daily  enoxaparin, 40 mg, subcutaneous, Daily  [Held by provider] losartan, 100 mg, oral, Daily  losartan, 25 mg, oral, Daily  melatonin, 5 mg, oral, Nightly  polyethylene glycol, 17 g, oral, Daily      Continuous medications  [Held by provider] D5 % and 0.9 % sodium chloride, 125 " mL/hr      PRN medications  PRN medications: OLANZapine, oxyCODONE, QUEtiapine       Assessment/Plan   Principal Problem:    Cari-prosthetic supracondylar fracture of femur  Active Problems:    Periprosthetic supracondylar fracture of femur, initial encounter    Closed fracture of left femur (CMS/HCC)    Adriana Duran is a 78 year old F with a PMHx of HTN, DM 2 (not on meds), HLD, anxiety and depression, previous L TKA and ORIF to left hip who presented to the ED as a transfer from OSH for definitive treatment of a displaced left femoral diaphysis fracture.  Orthopedics took patient to the OR and patient underwent surgical intervention on 12/29/23 for L femoral shaft fracture percutaneous assisted reduction and retrograde intramedullary nailing. Patient admitted to Hospitalist Team D for further medical management. Pending dispo to SNF.      Interprosthetic left femur diaphysis fracture   S/p Left femoral shaft fracture percutaneous assisted reduction and retrograde intramedullary nailing and Left femoral shaft prophylactic plating on 12/29/23  :: mechanical fall from standing   - Orthopedics consulted. Appreciate recs  ::: WBAT LLE  - Pain control:    - Tylenol 975 mg q8h  sched  - Continue Oxy 5 mg q4h  PRN for severe pain  - OT/PT rec mod intensity      Delirium at night  -Baseline delirium per   -Olanzapine 2.5 mg q6hrs PRN agitation     HTN   - Continue losartan 25 mg daily   - Holding home losartan 100 mg daily      History of DMII  :: HbA1c of 5.7 on 2/2023  :: not on DM meds      HLD   - Continue home atorvastatin 20 mg daily      MDD  Anxiety   - Continue home meds: Cymbalta 60 mg and buspar 10 mg TID  - Started Seroquel 12.5. mg BID PRN anxiety yesterday   - Off home citalopram 40 mg as worry Serotonin Syndrome with SSRI and SNRI     Dispo  - Pending SNF- previous facility unable to take patient.      F: none  E: replete PRN   N: regular diet  GI ppx: not indicated   DVT ppx: SCDs; lovenox 40 mg  daily       Patient dicussed with attending, Dr. Lowe.      Plan preliminary until cosigned by attending physician.      Nancie Jeffrey MD   PGY1, Family Medicine   [facility]  Available by [employer]

## 2024-01-04 NOTE — DOCUMENTATION CLARIFICATION NOTE
"    PATIENT:               LYNN GOMEZ  ACCT #:                  [id number]  MRN:                       [id number]  :                       1945  ADMIT DATE:       2023 9:02 PM  DISCH DATE:  RESPONDING PROVIDER #:        [id number]          PROVIDER RESPONSE TEXT:    Acute blood loss anemia    CDI QUERY TEXT:    [facility]_Anemia Specificity    Instruction:    Based on your assessment of the patient and the clinical information, please provide the requested documentation by clicking on the appropriate radio button and enter any additional information if prompted.    Question: Please further clarify the diagnosis of anemia as    When answering this query, please exercise your independent professional judgment. The fact that a question is being asked, does not imply that any particular answer is desired or expected.    The patient's clinical indicators include:  Clinical Information:  Patient is a 78 year old female who presented to the ED as a transfer from an OSH for treatment of a displaced left femoral diaphysis fracture.    Clinical Indicators:   at 1538, Hgb 10.7 and Hct 34.7   at 0833, Hgb   5.4 and Hct 17.6   at 1152, Hgb   5.4 and Hct 17.3   at 2129, Hgb   6.7 and Hct 21.0   at 1058, Hgb   9.2 and Hct 28.9   at 0600, Hgb   8.3 and Hct 26.8    Surgical EBL, 150 cc    From the Significant Event Note on , \" Called to patient's bedside due to Hgb of 5.4 with concern for surgical incision bleed. On exam, there is no visible bleeding through the ACE wrap and underlying mepilex dressings are CDI. No concern for significant  post-op incision site bleed at this time. Likely due to intra-op blood loss and should improve with time, transfuse as needed. \"    From the Progress Note on , \" Acute Anemia (Likely 2/2 surgical intervention) Patient received 2 unit pRBCs yesterday. Hb today at 8.1 \"    Treatment:  Frequent serum CBCs, blood transfusion, monitoring for signs or " symptoms of bleeding, frequent vital signs, monitoring surgical drain output    Risk Factors:  Fractured left femur, ORIF of left femur with IM nailing, need for blood transfusion, low Hgb and Hct, CKD, hypotension  Options provided:  -- Acute blood loss anemia  -- Anemia due to chronic disease, Please specify chronic disease below  -- Other - I will add my own diagnosis  -- Refer to Clinical Documentation Reviewer    Query created by: Shanika Vidal on 1/3/2024 12:25 PM      Electronically signed by:  TONA GABRIEL MD 1/4/2024 2:36 PM

## 2024-01-04 NOTE — PROGRESS NOTES
Adriana Duran is a 78 y.o. female on day 6 of admission presenting with Cari-prosthetic supracondylar fracture of femur.    Transitional Care Coordination Progress Note:  Patient discussed during interdisciplinary rounds.   Team members present: MD and TCC  Plan per Medical/Surgical team: Patient is medically ready will discharge to [facility] today.  Payor: Medicare  Discharge disposition: [facility]  Potential Barriers: None  ADOD: 01/04/23    BABATUNDE ESPARZA

## 2024-01-04 NOTE — CARE PLAN
Problem: Pain - Adult  Goal: Verbalizes/displays adequate comfort level or baseline comfort level  Outcome: Progressing     Problem: Discharge Planning  Goal: Discharge to home or other facility with appropriate resources  Outcome: Progressing     Problem: Chronic Conditions and Co-morbidities  Goal: Patient's chronic conditions and co-morbidity symptoms are monitored and maintained or improved  Outcome: Progressing     Problem: Diabetes  Goal: Achieve decreasing blood glucose levels by end of shift  Outcome: Progressing  Goal: Increase stability of blood glucose readings by end of shift  Outcome: Progressing  Goal: Decrease in ketones present in urine by end of shift  Outcome: Progressing  Goal: Maintain electrolyte levels within acceptable range throughout shift  Outcome: Progressing  Goal: Maintain glucose levels >70mg/dl to <250mg/dl throughout shift  Outcome: Progressing  Goal: No changes in neurological exam by end of shift  Outcome: Progressing  Goal: Learn about and adhere to nutrition recommendations by end of shift  Outcome: Progressing  Goal: Vital signs within normal range for age by end of shift  Outcome: Progressing  Goal: Increase self care and/or family involovement by end of shift  Outcome: Progressing  Goal: Receive DSME education by end of shift  Outcome: Progressing     Problem: Skin  Goal: Decreased wound size/increased tissue granulation at next dressing change  Outcome: Progressing  Goal: Participates in plan/prevention/treatment measures  Outcome: Progressing  Goal: Prevent/manage excess moisture  Outcome: Progressing  Goal: Prevent/minimize sheer/friction injuries  Outcome: Progressing  Goal: Promote/optimize nutrition  Outcome: Progressing  Flowsheets (Taken 1/4/2024 2566)  Promote/optimize nutrition:   Monitor/record intake including meals   Consume > 50% meals/supplements   Offer water/supplements/favorite foods  Goal: Promote skin healing  Outcome: Progressing  Flowsheets (Taken  1/4/2024 3265)  Promote skin healing:   Turn/reposition every 2 hours/use positioning/transfer devices   Assess skin/pad under line(s)/device(s)   The patient's goals for the shift include      The clinical goals for the shift include Patient will have no complaints of pain during this shift

## 2024-01-04 NOTE — DISCHARGE SUMMARY
Discharge Diagnosis  Cari-prosthetic supracondylar fracture of femur    Issues Requiring Follow-Up  [ ] Orthopedics- Dr. Torres for suture removal in 2 weeks; post op visit  [ ] Primary Care- Monitor blood pressure and can resume Losartan      Test Results Pending At Discharge  Pending Labs       No current pending labs.            Hospital Course  Adriana Duran is a 78 year old F with a PMHx of HTN, DM 2 (not on meds), HLD, anxiety and depression, previous L TKA and ORIF to left hip who presented to the ED as a transfer from [facility] for definitive treatment of a displaced left femoral diaphysis fracture. Orthopedics took patient to the OR and patient underwent surgical intervention on 12/29/23 for L femoral shaft fracture percutaneous assisted reduction and retrograde intramedullary nailing. Patient admitted to Hospitalist Team D for further medical management. Patient's post-operative Hb was 5.4 and she received 1U pRBC. The Hb incremented to 6.7 and she received another unit. Patient's Hb then incremented appropriately at 8.1.  Low Hb likely resultant of surgery as no obvious bleeding on exam. Patient experienced some agitation at night and received Zyprexa 2.5 mg q6h PRN agitation. She also had a sitter. PT/OT saw patient and recommended mod intensity rehab. Patient agreeable to SNF. A sitter was discontinued. Patient's home citalopram 40 mg was discontinued as worry Serotonin Syndrome. Continued home Cymbalta 60 mg. Started seroquel 12.5. mg BID PRN for anxiety. A fever of 100.9 was recorded and a urine culture was collected. Ceftriaxone 1g daily was started empirically. Patient had recording of low BP of 79/51 and a 250 ml bolus of LR was given. Home losartan of 100 mg was held in setting of hypotension. Patient did not spike any more fevers and UA came back negative. Ceftriaxone was discontinued. Losartan 25 mg daily was started and then held in the setting of hypotension. For patient's pain regimen  Tylenol 975 mg 8hr scheduled and oxycodone 5mg q4h  for severe pain. Patient was accepted at [facility] for rehab. On the day of discharge, the patient was seen and evaluated by the hospitalist team and deemed suitable for discharge to SNF.  There were no significant events overnight.  Vitals were reviewed and within normal  limits. Labs were stable at discharge.     Medications discontinued:  -citalopram 40 mg     Medications Started:   -ASA 81 mg BID until 2/15/24  -Oxycodone 5 mg q4h PRN pain  -Seroquel 25 mg BID PRN for anxiety  -Calcium carbonate-Vit D daily     Medications HELD:  Losartan 100mg daily       Pertinent Physical Exam At Time of Discharge  Physical Exam  Constitutional:       Appearance: Normal appearance.   HENT:      Head: Normocephalic and atraumatic.   Cardiovascular:      Rate and Rhythm: Normal rate and regular rhythm.      Pulses: Normal pulses.      Heart sounds: Normal heart sounds.   Pulmonary:      Effort: Pulmonary effort is normal. No respiratory distress.      Breath sounds: Normal breath sounds. No wheezing.   Abdominal:      General: Bowel sounds are normal. There is no distension.      Palpations: Abdomen is soft.      Tenderness: There is no abdominal tenderness. There is no guarding.   Musculoskeletal:      Comments: Dressing intact to L thigh with no strike-through   Skin:     General: Skin is warm and dry.   Neurological:      Mental Status: She is alert. Mental status is at baseline.   Psychiatric:         Mood and Affect: Mood normal.         Behavior: Behavior normal.         Thought Content: Thought content normal.         Home Medications     Medication List      START taking these medications     aspirin 81 mg EC tablet; Take 1 tablet (81 mg) by mouth 2 times a day.   For post surgical blood clot prevention   calcium carbonate-vitamin D3 500 mg-5 mcg (200 unit) tablet; Take 1   tablet by mouth 2 times a day.   oxyCODONE 5 mg immediate release tablet; Commonly known as:  Roxicodone;   Take 1 tablet (5 mg) by mouth every 4 hours if needed for severe pain (7 -   10) for up to 3 days.   QUEtiapine 25 mg tablet; Commonly known as: SEROquel; Take 1 tablet (25   mg) by mouth 2 times a day as needed (Anxiety).     CONTINUE taking these medications     acetaminophen 325 mg tablet; Commonly known as: Tylenol   atorvastatin 20 mg tablet; Commonly known as: Lipitor; Take 1 tablet (20   mg) by mouth once daily.   busPIRone 10 mg tablet; Commonly known as: Buspar; Take 1 tablet (10 mg)   by mouth 3 times a day.   citalopram 40 mg tablet; Commonly known as: CeleXA   docusate sodium 100 mg capsule; Commonly known as: Colace   DULoxetine 60 mg DR capsule; Commonly known as: Cymbalta   losartan 100 mg tablet; Commonly known as: Cozaar; Take 1 tablet (100   mg) by mouth once daily.   multivitamin tablet   sodium bicarbonate 650 mg tablet   Vitamin B-12 1,000 mcg tablet; Generic drug: cyanocobalamin       Outpatient Follow-Up  Future Appointments   Date Time Provider Department Center   1/17/2024 11:00 AM Tawanda Joel PA-C [id number] [facility]   1/17/2024  1:15 PM Russell Torres MD [id number] [facility]   2/6/2024  3:15 PM Fernando Akins MD [id number] [facility]       Nancie Jeffrey MD

## 2024-01-04 NOTE — PROGRESS NOTES
Transitional Care Coordinator Progress Note:   [facility] is not able to accept pt, so new SNF list provided to pt/ for additional SNF preferences.  Pt's  will provide additional preferences this afternoon.  Care coordinator will continue to follow for discharge planning needs.       Addendum 1600:  Received a call from pt's  with the following SNF preferences: [facility], [facility] and [facility].  TCC Marleny will send preferences via [facility].    Kimberly Guthrie MSN, RN-BC  Transitional Care Coordinator (TCC)  [phone]

## 2024-01-04 NOTE — PROGRESS NOTES
Physical Therapy    Physical Therapy Treatment    Patient Name: Adriana Duran  MRN: [id number]  Today's Date: 1/4/2024  Time Calculation  Start Time: 1025  Stop Time: 1050  Time Calculation (min): 25 min       Assessment/Plan   PT Assessment  PT Assessment Results: Decreased strength, Decreased endurance, Impaired balance, Decreased mobility, Decreased cognition (agitated)  Rehab Prognosis: Good  End of Session Communication: Bedside nurse  Assessment Comment: Patient required assist x2 for all transfers/mobility-demonstrating increased weakness. Patient had outbursts of agitation throughout session-RN aware. Patient remains appropriate for continued therapy upon discharge at a moderate intensity level to focus on improved functional mobility, balance and activity tolerance.  End of Session Patient Position: Up in chair, Alarm off, not on at start of session  PT Plan  Inpatient/Swing Bed or Outpatient: Inpatient  PT Plan  Treatment/Interventions: Bed mobility, Transfer training, Gait training, Balance training, Strengthening, Therapeutic exercise  PT Plan: Skilled PT  PT Frequency: 5 times per week  PT Discharge Recommendations: Moderate intensity level of continued care  PT Recommended Transfer Status: Assist x2  PT - OK to Discharge: Yes (POC/goals/discharge rec created)      General Visit Information:   PT  Visit  PT Received On: 01/04/24  Response to Previous Treatment: Patient with no complaints from previous session.  General  Reason for Referral: fall with (L) periprosthetic femur fx s/p rIMN with plating 12/29  Past Medical History Relevant to Rehab: CKD, DM, (L) hip sx, (L) TKA, (R) knee surgery, (L) shoulder surgery, neuropathy.  Per EMR, has demo'ed sundowning during hospitalization.  Prior to Session Communication: Bedside nurse  Patient Position Received: Bed, 3 rail up, Alarm off, not on at start of session  Preferred Learning Style: auditory, verbal  General Comment:  (RN cleared patient to work  with therapy. Patient required increased encouragement to participate in therapy session.)    Subjective   Precautions:  Precautions  LE Weight Bearing Status: Weight Bearing as Tolerated (L LE)  Medical Precautions: Fall precautions    Objective   Pain:  Pain Assessment  Pain Assessment: 0-10  Pain Score: 0 - No pain  Cognition:  Cognition  Overall Cognitive Status: Impaired at baseline  Orientation Level: Disoriented to place, Disoriented to time    Activity Tolerance:  Activity Tolerance  Endurance: Tolerates 10 - 20 min exercise with multiple rests  Treatments:  Therapeutic Exercise  Therapeutic Exercise Performed: Yes  Therapeutic Exercise Activity 1:  (Seated Bilateral LE: ankle pumps, LAQ, marching, HS, GS x10 each)    Bed Mobility  Bed Mobility: Yes  Bed Mobility 1  Bed Mobility 1: Supine to sitting  Level of Assistance 1: Moderate assistance, Maximum tactile cues, Maximum verbal cues (x2)  Bed Mobility Comments 1:  (Patient required assistance with trunk and LE's during transfer)    Ambulation/Gait Training  Ambulation/Gait Training Performed: No  Transfers  Transfer: Yes  Transfer 1  Transfer From 1: Bed to  Transfer to 1: Stand  Transfer Device 1: Walker  Transfer Level of Assistance 1: Moderate assistance, +2, Maximum tactile cues, Maximum verbal cues  Trials/Comments 1:  (required boost to stand-Patient stood for 15 seconds then apruptly plopped down onto bed.)  Transfers 2  Transfer From 2: Stand to  Transfer to 2: Bed  Transfer Device 2: Walker  Transfer Level of Assistance 2: Moderate assistance, +2, Maximum tactile cues, Maximum verbal cues  Transfers 3  Transfer From 3: Bed to  Transfer to 3: Chair with arms  Technique 3: Stand pivot  Transfer Device 3: Walker  Transfer Level of Assistance 3: Moderate assistance, +2, Maximum tactile cues, Maximum verbal cues  Trials/Comments 3:  (Required increased assist with moving FWW. Patient became agitated during transfer.)    Outcome Measures:  [facility] Basic  Mobility  Turning from your back to your side while in a flat bed without using bedrails: A lot  Moving from lying on your back to sitting on the side of a flat bed without using bedrails: A lot  Moving to and from bed to chair (including a wheelchair): A lot  Standing up from a chair using your arms (e.g. wheelchair or bedside chair): A lot  To walk in hospital room: Total  Climbing 3-5 steps with railing: Total  Basic Mobility - Total Score: 10      Encounter Problems       Encounter Problems (Active)       Balance       STG - Maintains dynamic standing balance with upper extremity support with CGA and whw.  (Progressing)       Start:  01/01/24    Expected End:  01/15/24       INTERVENTIONS:  1. Practice standing with minimal support.  2. Educate patient about standing tolerance.  3. Educate patient about independence with gait, transfers, and ADL's.  4. Educate patient about use of assistive device.  5. Educate patient about self-directed care.            Mobility       STG - Patient will ambulate with CGA and whw 25 ft x2.  (Progressing)       Start:  01/01/24    Expected End:  01/15/24            As feasibly safe to attempt to simulate home setup, navigate 4 steps with mod Ax1 and 1 rail.  (Progressing)       Start:  01/01/24    Expected End:  01/15/24               Pain - Adult          Strength       Complete there ex to improve strength and mobility abilities. (Progressing)       Start:  01/01/24    Expected End:  01/15/24               Transfers       STG - Patient will perform bed mobility CGA.  (Progressing)       Start:  01/01/24    Expected End:  01/15/24            STG - Patient will transfer sit to and from stand CGA.  (Progressing)       Start:  01/01/24    Expected End:  01/15/24

## 2024-01-05 NOTE — NURSING NOTE
Discharge note:  This RN tried calling report to [facility] RN at 17:00, 17:20, 18:03 with no answer. There was no VM to leave a message. Patient's  Yung notified by this RN that patient was picked up at 18:00 by [facility] ambulance and was on the way to [facility]. IV taken out intact. Patient has all belongings including dentures, eyeglasses, and eyeglass case, Paper prescription for oxycodone in paperwork packet going to [facility] with patient. Lo Calloway RN

## 2024-01-09 LAB
ATRIAL RATE: 92 BPM
P AXIS: 92 DEGREES
P OFFSET: 174 MS
P ONSET: 139 MS
PR INTERVAL: 152 MS
Q ONSET: 215 MS
QRS COUNT: 16 BEATS
QRS DURATION: 84 MS
QT INTERVAL: 354 MS
QTC CALCULATION(BAZETT): 437 MS
QTC FREDERICIA: 408 MS
R AXIS: 201 DEGREES
T AXIS: 174 DEGREES
T OFFSET: 392 MS
VENTRICULAR RATE: 92 BPM

## 2024-01-17 ENCOUNTER — APPOINTMENT (OUTPATIENT)
Dept: ORTHOPEDIC SURGERY | Facility: HOSPITAL | Age: 79
End: 2024-01-17
Payer: MEDICARE

## 2024-01-24 PROBLEM — Z96.649 PERI-PROSTHETIC FRACTURE OF FEMUR FOLLOWING TOTAL HIP ARTHROPLASTY: Status: ACTIVE | Noted: 2023-12-29

## 2024-02-07 ENCOUNTER — TELEPHONE (OUTPATIENT)
Dept: PRIMARY CARE | Facility: CLINIC | Age: 79
End: 2024-02-07
Payer: MEDICARE

## 2024-02-07 DIAGNOSIS — L03.119 CELLULITIS OF LOWER EXTREMITY, UNSPECIFIED LATERALITY: Primary | ICD-10-CM

## 2024-02-07 RX ORDER — CEPHALEXIN 500 MG/1
500 CAPSULE ORAL 2 TIMES DAILY
Qty: 14 CAPSULE | Refills: 0 | Status: SHIPPED | OUTPATIENT
Start: 2024-02-07 | End: 2024-02-14

## 2024-02-07 NOTE — TELEPHONE ENCOUNTER
Patient's  calling to say wife recently fell and broke her leg and had to have it surgically repaired but he has noticed that she seems to have an infection in one of her toes which he thought had been cleared up but he is wondering if and antibiotic can be called in to [facility] pharmacy in [place].  Please advise.  She cannot walk well enough to come in to the office.

## 2024-02-07 NOTE — TELEPHONE ENCOUNTER
notified.    Altered mental status DM type 2, not at goal Tachypnea DM type 2, not at goal DM type 2, not at goal DM type 2, not at goal Tachypnea Altered mental status Altered mental status Tachypnea Tachypnea Tachypnea Tachypnea Tachypnea

## 2024-02-16 DIAGNOSIS — N18.4 CHRONIC KIDNEY DISEASE, STAGE 4 (SEVERE) (MULTI): Primary | ICD-10-CM

## 2024-02-22 ENCOUNTER — HOSPITAL ENCOUNTER (OUTPATIENT)
Dept: RADIOLOGY | Facility: HOSPITAL | Age: 79
Discharge: HOME | End: 2024-02-22
Payer: MEDICARE

## 2024-02-22 ENCOUNTER — OFFICE VISIT (OUTPATIENT)
Dept: WOUND CARE | Facility: HOSPITAL | Age: 79
End: 2024-02-22
Payer: MEDICARE

## 2024-02-22 DIAGNOSIS — Z96.649 PERIPROSTHETIC FRACTURE OF FEMUR FOLLOWING TOTAL REPLACEMENT OF HIP, SUBSEQUENT ENCOUNTER: ICD-10-CM

## 2024-02-22 DIAGNOSIS — L97.529 CHRONIC ULCER OF GREAT TOE OF LEFT FOOT, UNSPECIFIED ULCER STAGE (MULTI): ICD-10-CM

## 2024-02-22 DIAGNOSIS — M86.171 ACUTE OSTEOMYELITIS OF RIGHT ANKLE OR FOOT (MULTI): Primary | ICD-10-CM

## 2024-02-22 DIAGNOSIS — L97.529 CHRONIC ULCER OF GREAT TOE OF LEFT FOOT, UNSPECIFIED ULCER STAGE (MULTI): Primary | ICD-10-CM

## 2024-02-22 DIAGNOSIS — M97.8XXD PERIPROSTHETIC FRACTURE OF FEMUR FOLLOWING TOTAL REPLACEMENT OF HIP, SUBSEQUENT ENCOUNTER: ICD-10-CM

## 2024-02-22 PROCEDURE — 88305 TISSUE EXAM BY PATHOLOGIST: CPT | Performed by: PATHOLOGY

## 2024-02-22 PROCEDURE — 11044 DBRDMT BONE 1ST 20 SQ CM/<: CPT

## 2024-02-22 PROCEDURE — 73630 X-RAY EXAM OF FOOT: CPT | Mod: LT

## 2024-02-22 PROCEDURE — 73552 X-RAY EXAM OF FEMUR 2/>: CPT | Mod: LT

## 2024-02-22 PROCEDURE — 88305 TISSUE EXAM BY PATHOLOGIST: CPT | Mod: TC | Performed by: PODIATRIST

## 2024-02-22 PROCEDURE — 88311 DECALCIFY TISSUE: CPT | Performed by: PATHOLOGY

## 2024-02-22 PROCEDURE — 99214 OFFICE O/P EST MOD 30 MIN: CPT | Mod: 25

## 2024-02-27 LAB
LABORATORY COMMENT REPORT: NORMAL
PATH REPORT.FINAL DX SPEC: NORMAL
PATH REPORT.GROSS SPEC: NORMAL
PATH REPORT.RELEVANT HX SPEC: NORMAL
PATH REPORT.TOTAL CANCER: NORMAL

## 2024-02-29 ENCOUNTER — PREP FOR PROCEDURE (OUTPATIENT)
Dept: OPERATING ROOM | Facility: HOSPITAL | Age: 79
End: 2024-02-29

## 2024-02-29 ENCOUNTER — LAB (OUTPATIENT)
Dept: LAB | Facility: LAB | Age: 79
End: 2024-02-29
Payer: MEDICARE

## 2024-02-29 ENCOUNTER — OFFICE VISIT (OUTPATIENT)
Dept: WOUND CARE | Facility: HOSPITAL | Age: 79
End: 2024-02-29
Payer: MEDICARE

## 2024-02-29 DIAGNOSIS — L97.524 NON-PRESSURE CHRONIC ULCER OF OTHER PART OF LEFT FOOT WITH NECROSIS OF BONE (MULTI): ICD-10-CM

## 2024-02-29 DIAGNOSIS — M86.9 OSTEOMYELITIS OF GREAT TOE OF LEFT FOOT (MULTI): Primary | ICD-10-CM

## 2024-02-29 DIAGNOSIS — N18.4 CHRONIC KIDNEY DISEASE, STAGE 4 (SEVERE) (MULTI): ICD-10-CM

## 2024-02-29 LAB
ALBUMIN SERPL-MCNC: 3.4 G/DL (ref 3.5–5)
ANION GAP SERPL CALC-SCNC: 11 MMOL/L
BUN SERPL-MCNC: 16 MG/DL (ref 8–25)
CALCIUM SERPL-MCNC: 8.8 MG/DL (ref 8.5–10.4)
CHLORIDE SERPL-SCNC: 106 MMOL/L (ref 97–107)
CO2 SERPL-SCNC: 25 MMOL/L (ref 24–31)
CREAT SERPL-MCNC: 1 MG/DL (ref 0.4–1.6)
EGFRCR SERPLBLD CKD-EPI 2021: 57 ML/MIN/1.73M*2
ERYTHROCYTE [DISTWIDTH] IN BLOOD BY AUTOMATED COUNT: 16.6 % (ref 11.5–14.5)
FERRITIN SERPL-MCNC: 170 NG/ML (ref 13–150)
GLUCOSE SERPL-MCNC: 112 MG/DL (ref 65–99)
HCT VFR BLD AUTO: 32 % (ref 36–46)
HGB BLD-MCNC: 9.6 G/DL (ref 12–16)
IRON SATN MFR SERPL: 22 % (ref 12–50)
IRON SERPL-MCNC: 48 UG/DL (ref 30–160)
MCH RBC QN AUTO: 29.4 PG (ref 26–34)
MCHC RBC AUTO-ENTMCNC: 30 G/DL (ref 32–36)
MCV RBC AUTO: 98 FL (ref 80–100)
NRBC BLD-RTO: 0 /100 WBCS (ref 0–0)
PHOSPHATE SERPL-MCNC: 3.3 MG/DL (ref 2.5–4.5)
PLATELET # BLD AUTO: 181 X10*3/UL (ref 150–450)
POTASSIUM SERPL-SCNC: 3.9 MMOL/L (ref 3.4–5.1)
PTH-INTACT SERPL-MCNC: 48.5 PG/ML (ref 18.5–88)
RBC # BLD AUTO: 3.26 X10*6/UL (ref 4–5.2)
SODIUM SERPL-SCNC: 142 MMOL/L (ref 133–145)
TIBC SERPL-MCNC: 221 UG/DL (ref 228–428)
TRANSFERRIN SERPL-MCNC: 187 MG/DL (ref 200–360)
UIBC SERPL-MCNC: 173 UG/DL (ref 110–370)
WBC # BLD AUTO: 5.3 X10*3/UL (ref 4.4–11.3)

## 2024-02-29 PROCEDURE — 83970 ASSAY OF PARATHORMONE: CPT

## 2024-02-29 PROCEDURE — 36415 COLL VENOUS BLD VENIPUNCTURE: CPT

## 2024-02-29 PROCEDURE — 83540 ASSAY OF IRON: CPT

## 2024-02-29 PROCEDURE — 85027 COMPLETE CBC AUTOMATED: CPT

## 2024-02-29 PROCEDURE — 84466 ASSAY OF TRANSFERRIN: CPT

## 2024-02-29 PROCEDURE — 82728 ASSAY OF FERRITIN: CPT

## 2024-02-29 PROCEDURE — 11044 DBRDMT BONE 1ST 20 SQ CM/<: CPT

## 2024-02-29 PROCEDURE — 83550 IRON BINDING TEST: CPT

## 2024-02-29 PROCEDURE — 80069 RENAL FUNCTION PANEL: CPT

## 2024-03-07 ENCOUNTER — OFFICE VISIT (OUTPATIENT)
Dept: WOUND CARE | Facility: HOSPITAL | Age: 79
End: 2024-03-07
Payer: MEDICARE

## 2024-03-07 PROCEDURE — 11044 DBRDMT BONE 1ST 20 SQ CM/<: CPT

## 2024-03-08 ENCOUNTER — HOSPITAL ENCOUNTER (OUTPATIENT)
Facility: HOSPITAL | Age: 79
Setting detail: OUTPATIENT SURGERY
Discharge: HOME | End: 2024-03-08
Attending: PODIATRIST | Admitting: PODIATRIST
Payer: MEDICARE

## 2024-03-08 ENCOUNTER — PHARMACY VISIT (OUTPATIENT)
Dept: PHARMACY | Facility: CLINIC | Age: 79
End: 2024-03-08
Payer: COMMERCIAL

## 2024-03-08 ENCOUNTER — ANESTHESIA EVENT (OUTPATIENT)
Dept: OPERATING ROOM | Facility: HOSPITAL | Age: 79
End: 2024-03-08
Payer: MEDICARE

## 2024-03-08 ENCOUNTER — ANESTHESIA (OUTPATIENT)
Dept: OPERATING ROOM | Facility: HOSPITAL | Age: 79
End: 2024-03-08
Payer: MEDICARE

## 2024-03-08 VITALS
HEART RATE: 77 BPM | TEMPERATURE: 97.2 F | OXYGEN SATURATION: 98 % | DIASTOLIC BLOOD PRESSURE: 58 MMHG | SYSTOLIC BLOOD PRESSURE: 130 MMHG | RESPIRATION RATE: 16 BRPM

## 2024-03-08 DIAGNOSIS — M86.9 OSTEOMYELITIS OF GREAT TOE OF LEFT FOOT (MULTI): ICD-10-CM

## 2024-03-08 DIAGNOSIS — G89.18 POST-OPERATIVE PAIN: Primary | ICD-10-CM

## 2024-03-08 DIAGNOSIS — L97.524 NON-PRESSURE CHRONIC ULCER OF OTHER PART OF LEFT FOOT WITH NECROSIS OF BONE (MULTI): ICD-10-CM

## 2024-03-08 LAB
GLUCOSE BLD MANUAL STRIP-MCNC: 72 MG/DL (ref 74–99)
GLUCOSE BLD MANUAL STRIP-MCNC: 78 MG/DL (ref 74–99)

## 2024-03-08 PROCEDURE — 2720000007 HC OR 272 NO HCPCS: Performed by: PODIATRIST

## 2024-03-08 PROCEDURE — 3700000002 HC GENERAL ANESTHESIA TIME - EACH INCREMENTAL 1 MINUTE: Performed by: PODIATRIST

## 2024-03-08 PROCEDURE — 3700000001 HC GENERAL ANESTHESIA TIME - INITIAL BASE CHARGE: Performed by: PODIATRIST

## 2024-03-08 PROCEDURE — 2500000005 HC RX 250 GENERAL PHARMACY W/O HCPCS: Performed by: PODIATRIST

## 2024-03-08 PROCEDURE — 3600000008 HC OR TIME - EACH INCREMENTAL 1 MINUTE - PROCEDURE LEVEL THREE: Performed by: PODIATRIST

## 2024-03-08 PROCEDURE — 88305 TISSUE EXAM BY PATHOLOGIST: CPT | Performed by: PATHOLOGY

## 2024-03-08 PROCEDURE — 88304 TISSUE EXAM BY PATHOLOGIST: CPT | Performed by: PATHOLOGY

## 2024-03-08 PROCEDURE — 3600000003 HC OR TIME - INITIAL BASE CHARGE - PROCEDURE LEVEL THREE: Performed by: PODIATRIST

## 2024-03-08 PROCEDURE — A28820 PR AMPUTATION TOE,MT-P JT: Performed by: STUDENT IN AN ORGANIZED HEALTH CARE EDUCATION/TRAINING PROGRAM

## 2024-03-08 PROCEDURE — 82947 ASSAY GLUCOSE BLOOD QUANT: CPT

## 2024-03-08 PROCEDURE — RXMED WILLOW AMBULATORY MEDICATION CHARGE

## 2024-03-08 PROCEDURE — A28820 PR AMPUTATION TOE,MT-P JT: Performed by: NURSE ANESTHETIST, CERTIFIED REGISTERED

## 2024-03-08 PROCEDURE — A4217 STERILE WATER/SALINE, 500 ML: HCPCS | Performed by: PODIATRIST

## 2024-03-08 PROCEDURE — 2500000005 HC RX 250 GENERAL PHARMACY W/O HCPCS: Performed by: NURSE ANESTHETIST, CERTIFIED REGISTERED

## 2024-03-08 PROCEDURE — 7100000001 HC RECOVERY ROOM TIME - INITIAL BASE CHARGE: Performed by: PODIATRIST

## 2024-03-08 PROCEDURE — 7100000002 HC RECOVERY ROOM TIME - EACH INCREMENTAL 1 MINUTE: Performed by: PODIATRIST

## 2024-03-08 PROCEDURE — 2500000004 HC RX 250 GENERAL PHARMACY W/ HCPCS (ALT 636 FOR OP/ED): Performed by: PODIATRIST

## 2024-03-08 PROCEDURE — 99221 1ST HOSP IP/OBS SF/LOW 40: CPT

## 2024-03-08 PROCEDURE — 2500000004 HC RX 250 GENERAL PHARMACY W/ HCPCS (ALT 636 FOR OP/ED): Performed by: NURSE ANESTHETIST, CERTIFIED REGISTERED

## 2024-03-08 PROCEDURE — 88305 TISSUE EXAM BY PATHOLOGIST: CPT | Mod: TC | Performed by: PODIATRIST

## 2024-03-08 PROCEDURE — 99100 ANES PT EXTEME AGE<1 YR&>70: CPT | Performed by: STUDENT IN AN ORGANIZED HEALTH CARE EDUCATION/TRAINING PROGRAM

## 2024-03-08 PROCEDURE — 88311 DECALCIFY TISSUE: CPT | Performed by: PATHOLOGY

## 2024-03-08 PROCEDURE — 7100000010 HC PHASE TWO TIME - EACH INCREMENTAL 1 MINUTE: Performed by: PODIATRIST

## 2024-03-08 PROCEDURE — 7100000009 HC PHASE TWO TIME - INITIAL BASE CHARGE: Performed by: PODIATRIST

## 2024-03-08 RX ORDER — IPRATROPIUM BROMIDE 0.5 MG/2.5ML
500 SOLUTION RESPIRATORY (INHALATION) EVERY 30 MIN PRN
Status: DISCONTINUED | OUTPATIENT
Start: 2024-03-08 | End: 2024-03-08 | Stop reason: HOSPADM

## 2024-03-08 RX ORDER — BISMUTH SUBSALICYLATE 262 MG
1 TABLET,CHEWABLE ORAL DAILY
Status: CANCELLED | OUTPATIENT
Start: 2024-03-08

## 2024-03-08 RX ORDER — FENTANYL CITRATE 50 UG/ML
50 INJECTION, SOLUTION INTRAMUSCULAR; INTRAVENOUS EVERY 5 MIN PRN
Status: DISCONTINUED | OUTPATIENT
Start: 2024-03-08 | End: 2024-03-08 | Stop reason: HOSPADM

## 2024-03-08 RX ORDER — OXYCODONE AND ACETAMINOPHEN 5; 325 MG/1; MG/1
1 TABLET ORAL EVERY 6 HOURS PRN
Qty: 28 TABLET | Refills: 0 | Status: SHIPPED | OUTPATIENT
Start: 2024-03-08 | End: 2024-03-15

## 2024-03-08 RX ORDER — DOCUSATE SODIUM 100 MG/1
100 CAPSULE, LIQUID FILLED ORAL 2 TIMES DAILY PRN
Status: CANCELLED | OUTPATIENT
Start: 2024-03-08

## 2024-03-08 RX ORDER — HYDROMORPHONE HYDROCHLORIDE 0.2 MG/ML
0.2 INJECTION INTRAMUSCULAR; INTRAVENOUS; SUBCUTANEOUS EVERY 5 MIN PRN
Status: DISCONTINUED | OUTPATIENT
Start: 2024-03-08 | End: 2024-03-08 | Stop reason: HOSPADM

## 2024-03-08 RX ORDER — ATORVASTATIN CALCIUM 20 MG/1
20 TABLET, FILM COATED ORAL DAILY
Status: CANCELLED | OUTPATIENT
Start: 2024-03-08

## 2024-03-08 RX ORDER — QUETIAPINE FUMARATE 25 MG/1
25 TABLET, FILM COATED ORAL 2 TIMES DAILY PRN
Status: CANCELLED | OUTPATIENT
Start: 2024-03-08

## 2024-03-08 RX ORDER — PNV NO.95/FERROUS FUM/FOLIC AC 28MG-0.8MG
100 TABLET ORAL DAILY
Status: CANCELLED | OUTPATIENT
Start: 2024-03-08

## 2024-03-08 RX ORDER — ALBUTEROL SULFATE 0.83 MG/ML
2.5 SOLUTION RESPIRATORY (INHALATION) EVERY 30 MIN PRN
Status: DISCONTINUED | OUTPATIENT
Start: 2024-03-08 | End: 2024-03-08 | Stop reason: HOSPADM

## 2024-03-08 RX ORDER — DULOXETIN HYDROCHLORIDE 60 MG/1
60 CAPSULE, DELAYED RELEASE ORAL DAILY
Status: CANCELLED | OUTPATIENT
Start: 2024-03-08

## 2024-03-08 RX ORDER — CEFAZOLIN 1 G/1
INJECTION, POWDER, FOR SOLUTION INTRAVENOUS AS NEEDED
Status: DISCONTINUED | OUTPATIENT
Start: 2024-03-08 | End: 2024-03-08

## 2024-03-08 RX ORDER — ACETAMINOPHEN 325 MG/1
650 TABLET ORAL EVERY 4 HOURS PRN
Status: CANCELLED | OUTPATIENT
Start: 2024-03-08

## 2024-03-08 RX ORDER — BUPIVACAINE HYDROCHLORIDE 5 MG/ML
INJECTION, SOLUTION PERINEURAL AS NEEDED
Status: DISCONTINUED | OUTPATIENT
Start: 2024-03-08 | End: 2024-03-08 | Stop reason: HOSPADM

## 2024-03-08 RX ORDER — CITALOPRAM 40 MG/1
40 TABLET, FILM COATED ORAL DAILY
Status: CANCELLED | OUTPATIENT
Start: 2024-03-08

## 2024-03-08 RX ORDER — OXYCODONE AND ACETAMINOPHEN 5; 325 MG/1; MG/1
1 TABLET ORAL EVERY 6 HOURS PRN
Status: CANCELLED | OUTPATIENT
Start: 2024-03-08

## 2024-03-08 RX ORDER — LABETALOL HYDROCHLORIDE 5 MG/ML
5 INJECTION, SOLUTION INTRAVENOUS EVERY 5 MIN PRN
Status: DISCONTINUED | OUTPATIENT
Start: 2024-03-08 | End: 2024-03-08 | Stop reason: HOSPADM

## 2024-03-08 RX ORDER — PROPOFOL 10 MG/ML
INJECTION, EMULSION INTRAVENOUS AS NEEDED
Status: DISCONTINUED | OUTPATIENT
Start: 2024-03-08 | End: 2024-03-08

## 2024-03-08 RX ORDER — LIDOCAINE HYDROCHLORIDE 10 MG/ML
INJECTION INFILTRATION; PERINEURAL AS NEEDED
Status: DISCONTINUED | OUTPATIENT
Start: 2024-03-08 | End: 2024-03-08 | Stop reason: HOSPADM

## 2024-03-08 RX ORDER — MIDAZOLAM HYDROCHLORIDE 1 MG/ML
INJECTION, SOLUTION INTRAMUSCULAR; INTRAVENOUS AS NEEDED
Status: DISCONTINUED | OUTPATIENT
Start: 2024-03-08 | End: 2024-03-08

## 2024-03-08 RX ORDER — LOSARTAN POTASSIUM 100 MG/1
100 TABLET ORAL DAILY
Status: CANCELLED | OUTPATIENT
Start: 2024-03-08

## 2024-03-08 RX ORDER — LIDOCAINE HYDROCHLORIDE 10 MG/ML
INJECTION INFILTRATION; PERINEURAL AS NEEDED
Status: DISCONTINUED | OUTPATIENT
Start: 2024-03-08 | End: 2024-03-08

## 2024-03-08 RX ORDER — SODIUM CHLORIDE, SODIUM LACTATE, POTASSIUM CHLORIDE, CALCIUM CHLORIDE 600; 310; 30; 20 MG/100ML; MG/100ML; MG/100ML; MG/100ML
40 INJECTION, SOLUTION INTRAVENOUS CONTINUOUS
Status: DISCONTINUED | OUTPATIENT
Start: 2024-03-08 | End: 2024-03-08 | Stop reason: HOSPADM

## 2024-03-08 RX ORDER — FENTANYL CITRATE 50 UG/ML
INJECTION, SOLUTION INTRAMUSCULAR; INTRAVENOUS AS NEEDED
Status: DISCONTINUED | OUTPATIENT
Start: 2024-03-08 | End: 2024-03-08

## 2024-03-08 RX ORDER — ONDANSETRON HYDROCHLORIDE 2 MG/ML
4 INJECTION, SOLUTION INTRAVENOUS ONCE AS NEEDED
Status: DISCONTINUED | OUTPATIENT
Start: 2024-03-08 | End: 2024-03-08 | Stop reason: HOSPADM

## 2024-03-08 RX ORDER — SODIUM CHLORIDE, SODIUM LACTATE, POTASSIUM CHLORIDE, CALCIUM CHLORIDE 600; 310; 30; 20 MG/100ML; MG/100ML; MG/100ML; MG/100ML
50 INJECTION, SOLUTION INTRAVENOUS CONTINUOUS
Status: DISCONTINUED | OUTPATIENT
Start: 2024-03-08 | End: 2024-03-08 | Stop reason: HOSPADM

## 2024-03-08 RX ORDER — SODIUM BICARBONATE 650 MG/1
650 TABLET ORAL 3 TIMES DAILY
Status: CANCELLED | OUTPATIENT
Start: 2024-03-08

## 2024-03-08 RX ORDER — ONDANSETRON 4 MG/1
4 TABLET, FILM COATED ORAL EVERY 8 HOURS PRN
Qty: 20 TABLET | Refills: 0 | Status: SHIPPED | OUTPATIENT
Start: 2024-03-08

## 2024-03-08 RX ORDER — FERROUS SULFATE, DRIED 160(50) MG
1 TABLET, EXTENDED RELEASE ORAL 2 TIMES DAILY
Status: CANCELLED | OUTPATIENT
Start: 2024-03-08

## 2024-03-08 RX ORDER — BUSPIRONE HYDROCHLORIDE 10 MG/1
10 TABLET ORAL 3 TIMES DAILY
Status: CANCELLED | OUTPATIENT
Start: 2024-03-08

## 2024-03-08 RX ORDER — SODIUM CHLORIDE 0.9 G/100ML
IRRIGANT IRRIGATION AS NEEDED
Status: DISCONTINUED | OUTPATIENT
Start: 2024-03-08 | End: 2024-03-08 | Stop reason: HOSPADM

## 2024-03-08 RX ADMIN — PROPOFOL 50 MG: 10 INJECTION, EMULSION INTRAVENOUS at 14:57

## 2024-03-08 RX ADMIN — FENTANYL CITRATE 50 MCG: 50 INJECTION, SOLUTION INTRAMUSCULAR; INTRAVENOUS at 14:57

## 2024-03-08 RX ADMIN — CEFAZOLIN 2 G: 330 INJECTION, POWDER, FOR SOLUTION INTRAMUSCULAR; INTRAVENOUS at 15:00

## 2024-03-08 RX ADMIN — PROPOFOL 30 MG: 10 INJECTION, EMULSION INTRAVENOUS at 15:00

## 2024-03-08 RX ADMIN — SODIUM CHLORIDE, SODIUM LACTATE, POTASSIUM CHLORIDE, AND CALCIUM CHLORIDE: 600; 310; 30; 20 INJECTION, SOLUTION INTRAVENOUS at 14:50

## 2024-03-08 RX ADMIN — FENTANYL CITRATE 50 MCG: 50 INJECTION, SOLUTION INTRAMUSCULAR; INTRAVENOUS at 15:04

## 2024-03-08 RX ADMIN — PROPOFOL 50 MG: 10 INJECTION, EMULSION INTRAVENOUS at 14:58

## 2024-03-08 RX ADMIN — MIDAZOLAM 1 MG: 1 INJECTION INTRAMUSCULAR; INTRAVENOUS at 14:50

## 2024-03-08 RX ADMIN — PROPOFOL 50 MG: 10 INJECTION, EMULSION INTRAVENOUS at 15:01

## 2024-03-08 RX ADMIN — LIDOCAINE HYDROCHLORIDE 50 MG: 10 INJECTION, SOLUTION INFILTRATION; PERINEURAL at 14:57

## 2024-03-08 RX ADMIN — MIDAZOLAM 1 MG: 1 INJECTION INTRAMUSCULAR; INTRAVENOUS at 14:55

## 2024-03-08 RX ADMIN — SODIUM CHLORIDE, SODIUM LACTATE, POTASSIUM CHLORIDE, AND CALCIUM CHLORIDE 50 ML/HR: 600; 310; 30; 20 INJECTION, SOLUTION INTRAVENOUS at 12:28

## 2024-03-08 RX ADMIN — PROPOFOL 20 MG: 10 INJECTION, EMULSION INTRAVENOUS at 15:02

## 2024-03-08 SDOH — HEALTH STABILITY: MENTAL HEALTH: CURRENT SMOKER: 0

## 2024-03-08 ASSESSMENT — PAIN - FUNCTIONAL ASSESSMENT
PAIN_FUNCTIONAL_ASSESSMENT: 0-10
PAIN_FUNCTIONAL_ASSESSMENT: CPOT (CRITICAL CARE PAIN OBSERVATION TOOL)
PAIN_FUNCTIONAL_ASSESSMENT: 0-10

## 2024-03-08 ASSESSMENT — ENCOUNTER SYMPTOMS
EYE PAIN: 0
DIFFICULTY URINATING: 0
BACK PAIN: 1
POLYDIPSIA: 0
COUGH: 0
SORE THROAT: 0
FREQUENCY: 1
VOMITING: 0
BLOOD IN STOOL: 0
NAUSEA: 0
NECK PAIN: 1
FEVER: 0
WEAKNESS: 1
WOUND: 1
FATIGUE: 1
CHILLS: 0
UNEXPECTED WEIGHT CHANGE: 0
BRUISES/BLEEDS EASILY: 0
SINUS PAIN: 0
HEADACHES: 0
SHORTNESS OF BREATH: 1
ABDOMINAL PAIN: 0
NUMBNESS: 1
HEMATURIA: 0

## 2024-03-08 ASSESSMENT — COLUMBIA-SUICIDE SEVERITY RATING SCALE - C-SSRS
2. HAVE YOU ACTUALLY HAD ANY THOUGHTS OF KILLING YOURSELF?: NO
6. HAVE YOU EVER DONE ANYTHING, STARTED TO DO ANYTHING, OR PREPARED TO DO ANYTHING TO END YOUR LIFE?: NO
1. IN THE PAST MONTH, HAVE YOU WISHED YOU WERE DEAD OR WISHED YOU COULD GO TO SLEEP AND NOT WAKE UP?: NO

## 2024-03-08 ASSESSMENT — PAIN SCALES - GENERAL
PAINLEVEL_OUTOF10: 0 - NO PAIN

## 2024-03-08 NOTE — H&P
History Of Present Illness  Adriana Duran is a 78 y.o. female presenting with a chronic wound on her left big toe that has been around for a few years. She is with her  who helps provide some history. She was seeing wound care, which increased healing, but it has gotten worse over the past few weeks following her recent injury. Pt denies pain in her left big toe and states it does not hurt when her  changes her packing dressing every night. Pt states her foot does hurt after walking for more than 10-15 minutes, and also has extensive back pain that contributes to this. Pt denies fever, chills, pain, and tachycardia. Pt denies history of heart attack or stroke.      Past Medical History  Past Medical History:   Diagnosis Date    Arthritis     Chronic kidney disease     Diabetes mellitus (CMS/HCC)     Foot pain, left     bunion turned to wound    Right knee pain     UTI (urinary tract infection)        Surgical History  Past Surgical History:   Procedure Laterality Date    HIP FRACTURE SURGERY Left     KNEE SURGERY Left     replacement    KNEE SURGERY Right     arthritis removed    SHOULDER SURGERY Left     rotator cuff        Social History  She reports that she has quit smoking. Her smoking use included cigarettes. She has never used smokeless tobacco. She reports that she does not drink alcohol and does not use drugs.    Family History  Family History   Problem Relation Name Age of Onset    Heart disease Father      Cancer Father      Diabetes Paternal Grandfather          Allergies  Strawberry, Ibuprofen, and Metformin    Review of Systems   Constitutional:  Positive for fatigue. Negative for chills, fever and unexpected weight change.   HENT:  Negative for congestion, dental problem, sinus pain and sore throat.    Eyes:  Negative for pain.   Respiratory:  Positive for shortness of breath (walking upstairs and when she argues with ). Negative for cough.    Cardiovascular:  Negative for  chest pain.   Gastrointestinal:  Negative for abdominal pain, blood in stool, nausea and vomiting.   Endocrine: Negative for polydipsia and polyuria.   Genitourinary:  Positive for frequency. Negative for difficulty urinating, hematuria and menstrual problem.   Musculoskeletal:  Positive for back pain, gait problem and neck pain.   Skin:  Positive for wound (left big toe).   Allergic/Immunologic: Negative for immunocompromised state.   Neurological:  Positive for weakness and numbness (left foot). Negative for syncope and headaches.   Hematological:  Does not bruise/bleed easily.   Psychiatric/Behavioral:  Negative for behavioral problems.         Physical Exam  Constitutional:       General: She is not in acute distress.     Appearance: Normal appearance.   HENT:      Head: Normocephalic and atraumatic.      Nose: Nose normal.      Mouth/Throat:      Mouth: Mucous membranes are moist.   Eyes:      Extraocular Movements: Extraocular movements intact.      Conjunctiva/sclera: Conjunctivae normal.   Cardiovascular:      Rate and Rhythm: Normal rate and regular rhythm.      Heart sounds: Normal heart sounds. No murmur heard.  Pulmonary:      Effort: Pulmonary effort is normal.      Breath sounds: Normal breath sounds.   Abdominal:      General: Abdomen is flat.      Palpations: Abdomen is soft.      Tenderness: There is no abdominal tenderness.   Musculoskeletal:         General: No swelling. Normal range of motion.      Cervical back: Normal range of motion.      Comments: Pt's left big toe is wrapped in a dressing. No sign of erythema surrounding bandage    Skin:     General: Skin is warm and dry.   Neurological:      General: No focal deficit present.      Mental Status: She is alert and oriented to person, place, and time.      Sensory: Sensory deficit (feet) present.   Psychiatric:         Mood and Affect: Mood normal.         Behavior: Behavior normal.          Last Recorded Vitals  There were no vitals taken for  this visit.    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 Jha        I spent 30 minutes in the professional and overall care of this patient.      Lucas Quintanilla PA-C

## 2024-03-08 NOTE — DISCHARGE INSTRUCTIONS
PODIATRY DISCHARGE INSTRUCTIONS  Please call to schedule appointment with Dr. Jha for first pot-op visit in 1 week after discharge or sooner if any problems or questions arise.  Please remain partial weight bearing to the heel with surgical shoe in place.  Keep dressing clean and dry until your first follow up appointment.  Do not shower unless using a cast protector to protect dressing.  If dressing gets wet, change or call office immediately as this can lead to increased risk of infection.  Place ice pack behind knee of extremity.  Elevate surgical extremity as much as possible to help with pain and swelling.  Should any problems, questions, or concerns arise, please call the clinic office.

## 2024-03-08 NOTE — ANESTHESIA PREPROCEDURE EVALUATION
Patient: Adriana Duran    Procedure Information       Date/Time: 03/08/24 1500    Procedures:       Amputation Digit Foot (Left) - Left Great Toe Amputation at the [facility]      Biopsy Bone Lower Extremity (Left) - JAMSHIDI bone biopsy 1st metatarsal  with 50244 Soft tisse rearrangement for closure    Location: [facility] OR 10 / [facility]    Surgeons: Radha Jha DPM          Past Medical History:   Diagnosis Date    Arthritis     Chronic kidney disease     Diabetes mellitus (CMS/HCC)     Foot pain, left     bunion turned to wound    Hypertension     Right knee pain     UTI (urinary tract infection)      Past Surgical History:   Procedure Laterality Date    HIP FRACTURE SURGERY Left     KNEE ARTHROPLASTY      KNEE SURGERY Left     replacement    KNEE SURGERY Right     arthritis removed    SHOULDER SURGERY Left     rotator cuff    TOTAL SHOULDER ARTHROPLASTY         Relevant Problems   Anesthesia (within normal limits)      Cardiovascular   (+) HTN (hypertension), benign   (+) Mixed hyperlipidemia      Endocrine   (+) Controlled type 2 diabetes mellitus with complication, without long-term current use of insulin (CMS/HCC)   (+) Diabetic nephropathy with proteinuria (CMS/HCC)      /Renal   (+) Diabetic nephropathy with proteinuria (CMS/HCC)      Neuro/Psych   (+) Anxiety   (+) Recurrent major depressive disorder (CMS/HCC)      Hematology   (+) Anemia   (+) Iron deficiency anemia      Musculoskeletal   (+) Osteoarthritis   (+) Osteoarthritis of knee   (+) Osteoarthritis of left knee   (+) Osteoarthritis of right knee      Infectious Disease   (+) Osteomyelitis of great toe of left foot (CMS/HCC)   (+) Scabies      Other   (+) Osteomyelitis of great toe of left foot (CMS/HCC)       Clinical information reviewed:   Tobacco  Allergies  Meds   Med Hx  Surg Hx  OB Status  Fam Hx  Soc   Hx        NPO Detail:  NPO/Void Status  Carbohydrate Drink Given Prior to Surgery? : N  Date of Last Liquid:  03/08/24  Time of Last Liquid: 0800  Date of Last Solid: 03/07/24  Time of Last Solid: 1630  Last Intake Type: Clear fluids  Time of Last Void: 0830         Physical Exam    Airway  Mallampati: II  TM distance: >3 FB  Neck ROM: full     Cardiovascular    Dental    Pulmonary    Abdominal            Anesthesia Plan    History of general anesthesia?: yes  History of complications of general anesthesia?: no    ASA 3     MAC     The patient is not a current smoker.  Patient was not previously instructed to abstain from smoking on day of procedure.  Patient did not smoke on day of procedure.  Education provided regarding risk of obstructive sleep apnea.  intravenous induction   Postoperative administration of opioids is intended.  Anesthetic plan and risks discussed with patient.  Use of blood products discussed with patient who consented to blood products.    Plan discussed with CRNA and CAA.

## 2024-03-08 NOTE — ANESTHESIA POSTPROCEDURE EVALUATION
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    Anesthesia Post Evaluation    Patient location during evaluation: bedside  Patient participation: complete - patient participated  Level of consciousness: awake and alert  Pain management: adequate  Multimodal analgesia pain management approach  Airway patency: patent  Two or more strategies used to mitigate risk of obstructive sleep apnea  Cardiovascular status: acceptable  Respiratory status: acceptable  Hydration status: acceptable  Postoperative Nausea and Vomiting: none        There were no known notable events for this encounter.

## 2024-03-08 NOTE — PERIOPERATIVE NURSING NOTE
1430 AWAITING OR. FAMILY PRESENT. TO BATHROOM TO VOID. INC. OF A LARGE AMOUNT OF URINE. BRIEF CHANGED. IV INFUSING WELL. MD OUT TO SEE PT FOR CONSENT.

## 2024-03-08 NOTE — OP NOTE
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) [L97.524]     Procedures   Bone Biopsy 1st metatarsal head left foot   40604 Skin plasty left foot   76788 Toe amputation left 1st MTPJ    Surgeons      * Radha Jha - Primary    Resident/Fellow/Other Assistant:  Surgeon(s) and Role: Jerald Montenegro DPM PGY-2    Procedure Summary  Anesthesia: Monitor Anesthesia Care  ASA: III  Anesthesia Staff: Anesthesiologist: Fernando Lai DO  CRNA: CANDIDA Cuenca-CRNA  Estimated Blood Loss: 10 mL  Intra-op Medications:   Administrations occurring from 1500 to 1700 on 24:   Medication Name Total Dose   lidocaine (Xylocaine) 10 mg/mL (1 %) injection 10 mL   BUPivacaine HCl (Marcaine) 0.5 % (5 mg/mL) injection 10 mL   sodium chloride 0.9 % irrigation solution 500 mL   lactated Ringer's infusion Cannot be calculated              Anesthesia Record               Intraprocedure I/O Totals          Intake    lactated Ringer's infusion 500.00 mL    Total Intake 500 mL          Specimen:   ID Type Source Tests Collected by Time   1 : 1st great toe Tissue DIGIT FIRST, LEFT FOOT SURGICAL PATHOLOGY EXAM Radha Jha DPM 3/8/2024 1512   2 : Clearance fragment 1st metatarsal Tissue DIGIT FIRST, LEFT FOOT SURGICAL PATHOLOGY EXAM Radha Jha DPM 3/8/2024 1513        Staff:   Circulator: Sabine Cervatnes RN  Scrub Person: Markell Diaz    Implants:     Findings: see below     Indications:  Adriana Duran is an 78 y.o. 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       Patient was consulted at length regarding the goals, risks, and benefits of surgical intervention and the most common complications including delayed healing, mal position, infection, numbness, swelling, DVT, and residual pain and possible need for revisional surgery. Also discussed were idiosyncratic risks such as loss of limb and/or death associated with adverse reactions to medications, anesthesia, VTE, or infection were discussed at length with the patient.  Expectations and goals of return to weightbearing/work/school time were discussed as tentative and somewhat variable upon patient's symptomatology postoperatively. Patient was encouraged to call or present to the office or my personal cell phone number if there are any unanswered questions.  The patient understands that one prescription for pain medication will be dispensed at the time of surgery only if needed. If they need a second prescription, they will receive a pain management referral at that time, along with the refill, and there will be no further narcotic pain medication dispensed after the one refill.  Patient does wish to proceed with planned surgical intervention at the conclusion of this conversation. Significant time was spent filling out, and orally reiterating all printed preoperative paperwork and fully explaining intended procedure in layman's terms as well as confirming laterality. Patient denied having further questions at this time regarding the intended surgical procedure(s).    Patient was transferred from the pre operative holding area to the OR and placed on the OR table in a secure supine position.  Anesthesia was administered per the anesthesiologist.  The surgical extremity was prepped and draped in  sterile aseptic technique.  An appropriate time out was performed and all in the room were in agreement.      Procedure #1 Left Hallux  amputation to level of MPJ (98321)  Attention was directed to the left hallux. Utilizing a 15 blade, a modified fishmouth incision was performed encirculating the distal phalanx of the left hallux at the level of the DIPJ and a linear incision along the dorsum aspect of the toe working proximally to the level of the MPJ. The incision was approximately 5 cm and was made deep to bone and the site was sharply dissected using a 15 blade and pick-ups. The distal phalanx was deemed nonviable and the bone was resected from its soft tissue attachments with a 15 blade. The surgical site was then explored by lifting the skin flaps from the bone surface of the proximally located phalanxes. All bone and soft tissue attachments were sent to pathology. Both bones were deemed nonviable and were resected from their soft tissue attachments until the metatarsal head was exposed.     Procedure #2 Bone Biopsy left 1st metatarsal head 20245  Due to the severity of the fractures proximal and distal phalanx the decision was made to bone biopsy the 1st metatarsal head.  A sagittal saw was utilized to resect the distal aspect of the 1st metatarsal head and send to pathology.      Procedure #3 Adjacent tissue transfer of foot <10 square cm (97858)  Attention was directed to the open lesion noted to the deficit of the left hallux amputation site. The lesion itself measured 5 cm x 2 cm.  A proposed 3:1 length-to-width ratio fusiform incision encapsulating said lesion was marked using a skin marker. A sharp incision was made utilizing a 15 blade through the dermis and into the subcutaneous tissue along marked border of the excisional fusiform incision. The incision was made deeper through the subcutaneous tissue and fascia until the underlying muscle/bone was exposed. This established the plane for the  remainder of the dissection and the lesion was carefully resected. The surgically induced defect was measured to be 5 cm x 2 cm x 1 cm. All bleeders from the surrounding tissue were ligated with the use of bovie.     The surrounding tissue was then carefully de-roofed utilizing sharp dissection along the same plane of the resected tissue creating flaps for closure of the surgically induced defect. The flap were carefully raised with a combination of sharp dissection and careful retraction to not disturb their vascular integrity. Through this technique, the surrounding tissue flaps were carefully transposed and advanced to cover the surgically induced defect and re-approximated together with a combination 2.0 and 3.0 nylon suture for subcutaneous tissue and 3.0 vicryl suture for skin. The surgically induced defect was fully closed and perfusion was noted to the advancement flaps  The skin edges of the surgical site were incised to healthy bleeding skin tissue and reapproximated with 2-0 prolene suture and staples. The site was then dressed with betadine soaked adaptic, betadine soaked gauze, cast padding, and ACE wrap.       The patient tolerated the procedure and anesthesia well and without complication.  The patient was transferred from the OR to the PACU with all vital signs stable and vascular status unchanged to the surgical extremity.  Digits 1,2,3,4 and 5 were well perfused at the end of the procedure.     Procedure Details:   Complications:  None; patient tolerated the procedure well.    Disposition: PACU - hemodynamically stable.  Condition: stable       Radha Jha  Phone Number: [phone]       No

## 2024-03-11 ASSESSMENT — PAIN SCALES - GENERAL: PAINLEVEL_OUTOF10: 0 - NO PAIN

## 2024-03-14 ENCOUNTER — OFFICE VISIT (OUTPATIENT)
Dept: WOUND CARE | Facility: HOSPITAL | Age: 79
End: 2024-03-14
Payer: MEDICARE

## 2024-03-14 PROCEDURE — 99213 OFFICE O/P EST LOW 20 MIN: CPT

## 2024-03-21 ENCOUNTER — OFFICE VISIT (OUTPATIENT)
Dept: WOUND CARE | Facility: HOSPITAL | Age: 79
End: 2024-03-21
Payer: MEDICARE

## 2024-03-21 PROCEDURE — 99214 OFFICE O/P EST MOD 30 MIN: CPT | Mod: 25

## 2024-03-21 PROCEDURE — 11042 DBRDMT SUBQ TIS 1ST 20SQCM/<: CPT

## 2024-03-28 ENCOUNTER — OFFICE VISIT (OUTPATIENT)
Dept: WOUND CARE | Facility: HOSPITAL | Age: 79
End: 2024-03-28
Payer: MEDICARE

## 2024-03-28 PROCEDURE — 99213 OFFICE O/P EST LOW 20 MIN: CPT

## 2024-04-12 ENCOUNTER — OFFICE VISIT (OUTPATIENT)
Dept: WOUND CARE | Facility: HOSPITAL | Age: 79
End: 2024-04-12
Payer: MEDICARE

## 2024-04-12 PROCEDURE — 99214 OFFICE O/P EST MOD 30 MIN: CPT

## 2024-04-18 ENCOUNTER — OFFICE VISIT (OUTPATIENT)
Dept: WOUND CARE | Facility: HOSPITAL | Age: 79
End: 2024-04-18
Payer: MEDICARE

## 2024-04-18 PROCEDURE — 99213 OFFICE O/P EST LOW 20 MIN: CPT

## 2024-04-18 PROCEDURE — 11721 DEBRIDE NAIL 6 OR MORE: CPT

## 2024-04-29 NOTE — PROGRESS NOTES
Subjective   Patient ID:   Adriana Duran is a 78 y.o. female who presents for No chief complaint on file..  HPI  Diabetes:  Not on medication for this.  Last A1C was 5.7 in Oct 2023.  DUE for labs.    HTN:  Taking Losartan.  BP today is   Denies dizziness, headaches.    HLD:  Taking Atorvastatin.  Last checked Feb 2023.    Anxiety/Depression:  Taking Buspar and Celexa.  Denies SI/HI.    OA/Femur fracture:  Seeing ortho.    Elevated ferritin:  Seen in Sep 2023 and again in Feb 2024.  DUE for re-check.    Left great toe wound/Osteomyelitis:  Seeing podiatry and wound care.  Hospitalized for this multiple times over the last 6 months.    Health maintenance:  Smoking: Former smoker.  Labs: Feb 2023. DUE for A1C, ferritin, lipid  Influenza:    Review of Systems  12 point review of systems negative unless stated above in HPI    There were no vitals filed for this visit.    Physical Exam  General: Alert and oriented, well nourished, no acute distress.  Lungs: Clear to auscultation, non-labored respiration.  Heart: Normal rate, regular rhythm, no murmur, gallop or edema.  Neurologic: Awake, alert, and oriented X3, CN II-XII intact.  Psychiatric: Cooperative, appropriate mood and affect.    Assessment/Plan     Diagnoses and all orders for this visit:  Medicare annual wellness visit, subsequent  Depression screening  Controlled type 2 diabetes mellitus with complication, without long-term current use of insulin (Multi)  Diabetic nephropathy with proteinuria (Multi)  HTN (hypertension), benign  Mixed hyperlipidemia  Anxiety  Osteoarthritis of right knee, unspecified osteoarthritis type  Recurrent major depressive disorder, in partial remission (CMS-[facility])  Elevated ferritin  Pathological fracture of neck of femur, unspecified laterality, sequela  Closed nondisplaced intertrochanteric fracture of femur, unspecified laterality, sequela  Type 2 diabetes mellitus with hyperglycemia, unspecified whether long term insulin use  (Multi)  Osteomyelitis of great toe of left foot (Multi)

## 2024-05-01 ENCOUNTER — OFFICE VISIT (OUTPATIENT)
Dept: PRIMARY CARE | Facility: CLINIC | Age: 79
End: 2024-05-01
Payer: MEDICARE

## 2024-05-01 VITALS
HEIGHT: 63 IN | DIASTOLIC BLOOD PRESSURE: 70 MMHG | WEIGHT: 175 LBS | BODY MASS INDEX: 31.01 KG/M2 | SYSTOLIC BLOOD PRESSURE: 104 MMHG | OXYGEN SATURATION: 98 % | HEART RATE: 89 BPM

## 2024-05-01 DIAGNOSIS — I10 HTN (HYPERTENSION), BENIGN: ICD-10-CM

## 2024-05-01 DIAGNOSIS — E11.21 DIABETIC NEPHROPATHY WITH PROTEINURIA (MULTI): ICD-10-CM

## 2024-05-01 DIAGNOSIS — E11.42 DIABETIC PERIPHERAL NEUROPATHY (MULTI): ICD-10-CM

## 2024-05-01 DIAGNOSIS — E11.8 CONTROLLED TYPE 2 DIABETES MELLITUS WITH COMPLICATION, WITHOUT LONG-TERM CURRENT USE OF INSULIN (MULTI): ICD-10-CM

## 2024-05-01 DIAGNOSIS — E78.2 MIXED HYPERLIPIDEMIA: ICD-10-CM

## 2024-05-01 DIAGNOSIS — F33.41 RECURRENT MAJOR DEPRESSIVE DISORDER, IN PARTIAL REMISSION (CMS-HCC): ICD-10-CM

## 2024-05-01 DIAGNOSIS — S72.146S CLOSED NONDISPLACED INTERTROCHANTERIC FRACTURE OF FEMUR, UNSPECIFIED LATERALITY, SEQUELA: ICD-10-CM

## 2024-05-01 DIAGNOSIS — M86.9 OSTEOMYELITIS OF GREAT TOE OF LEFT FOOT (MULTI): ICD-10-CM

## 2024-05-01 DIAGNOSIS — Z00.00 MEDICARE ANNUAL WELLNESS VISIT, SUBSEQUENT: Primary | ICD-10-CM

## 2024-05-01 DIAGNOSIS — L97.529: ICD-10-CM

## 2024-05-01 DIAGNOSIS — L97.524 NON-PRESSURE CHRONIC ULCER OF OTHER PART OF LEFT FOOT WITH NECROSIS OF BONE (MULTI): ICD-10-CM

## 2024-05-01 DIAGNOSIS — F41.9 ANXIETY: ICD-10-CM

## 2024-05-01 DIAGNOSIS — Z13.31 DEPRESSION SCREENING: ICD-10-CM

## 2024-05-01 DIAGNOSIS — M84.453S: ICD-10-CM

## 2024-05-01 DIAGNOSIS — R79.89 ELEVATED FERRITIN: ICD-10-CM

## 2024-05-01 DIAGNOSIS — Z00.00 ROUTINE GENERAL MEDICAL EXAMINATION AT HEALTH CARE FACILITY: ICD-10-CM

## 2024-05-01 DIAGNOSIS — M17.11 OSTEOARTHRITIS OF RIGHT KNEE, UNSPECIFIED OSTEOARTHRITIS TYPE: ICD-10-CM

## 2024-05-01 DIAGNOSIS — E11.65 TYPE 2 DIABETES MELLITUS WITH HYPERGLYCEMIA, UNSPECIFIED WHETHER LONG TERM INSULIN USE (MULTI): ICD-10-CM

## 2024-05-01 PROBLEM — S09.90XA INJURY OF HEAD: Status: ACTIVE | Noted: 2024-05-01

## 2024-05-01 PROBLEM — E87.6 HYPOKALEMIA: Status: ACTIVE | Noted: 2024-05-01

## 2024-05-01 PROBLEM — K80.20 BILIARY CALCULUS: Status: ACTIVE | Noted: 2024-05-01

## 2024-05-01 PROBLEM — R40.1 CLOUDED CONSCIOUSNESS: Status: ACTIVE | Noted: 2024-05-01
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
## 2024-05-01 PROBLEM — M25.559 HIP PAIN: Status: ACTIVE | Noted: 2024-05-01

## 2024-05-01 PROBLEM — S46.919A STRAIN OF SHOULDER: Status: ACTIVE | Noted: 2024-05-01

## 2024-05-01 PROBLEM — N39.0 ACUTE LOWER URINARY TRACT INFECTION: Status: ACTIVE | Noted: 2024-05-01

## 2024-05-01 PROBLEM — K86.9 DISORDER OF PANCREAS (HHS-HCC): Status: RESOLVED | Noted: 2023-09-08 | Resolved: 2024-05-01

## 2024-05-01 PROBLEM — N39.0 URINARY TRACT INFECTIOUS DISEASE: Status: ACTIVE | Noted: 2024-05-01

## 2024-05-01 PROBLEM — R53.1 WEAKNESS: Status: ACTIVE | Noted: 2024-05-01

## 2024-05-01 PROBLEM — R53.1 ASTHENIA: Status: ACTIVE | Noted: 2024-05-01

## 2024-05-01 PROBLEM — H25.10 NUCLEAR SENILE CATARACT: Status: ACTIVE | Noted: 2023-09-02

## 2024-05-01 PROBLEM — E87.20 ACIDOSIS: Status: ACTIVE | Noted: 2024-05-01

## 2024-05-01 PROBLEM — K80.50 COMMON BILE DUCT CALCULUS: Status: ACTIVE | Noted: 2023-09-08

## 2024-05-01 PROBLEM — R32 URINARY INCONTINENCE: Status: ACTIVE | Noted: 2024-05-01

## 2024-05-01 PROBLEM — Z78.9 MEDICAL HOME PATIENT: Status: ACTIVE | Noted: 2024-05-01

## 2024-05-01 PROBLEM — N18.4 STAGE 4 CHRONIC KIDNEY DISEASE (MULTI): Status: ACTIVE | Noted: 2024-05-01

## 2024-05-01 PROBLEM — R94.5 ABNORMAL LIVER FUNCTION: Status: ACTIVE | Noted: 2024-05-01

## 2024-05-01 PROBLEM — K86.9 DISORDER OF PANCREAS (HHS-HCC): Status: ACTIVE | Noted: 2023-09-08

## 2024-05-01 PROBLEM — R73.9 HYPERGLYCEMIA: Status: ACTIVE | Noted: 2024-05-01

## 2024-05-01 PROBLEM — D62 ANEMIA DUE TO ACUTE BLOOD LOSS: Status: ACTIVE | Noted: 2024-05-01

## 2024-05-01 PROBLEM — R41.82 ALTERED MENTAL STATUS: Status: ACTIVE | Noted: 2024-05-01

## 2024-05-01 PROBLEM — L97.509 ULCER OF GREAT TOE (MULTI): Status: ACTIVE | Noted: 2024-02-29

## 2024-05-01 PROBLEM — J20.9 ACUTE BRONCHITIS: Status: ACTIVE | Noted: 2024-05-01

## 2024-05-01 PROBLEM — N18.4 STAGE 4 CHRONIC KIDNEY DISEASE (MULTI): Status: RESOLVED | Noted: 2024-05-01 | Resolved: 2024-05-01

## 2024-05-01 PROBLEM — J06.9 VIRAL UPPER RESPIRATORY TRACT INFECTION: Status: ACTIVE | Noted: 2024-05-01

## 2024-05-01 PROBLEM — M25.512 LEFT SHOULDER PAIN: Status: ACTIVE | Noted: 2024-05-01

## 2024-05-01 PROBLEM — L25.9 CONTACT DERMATITIS: Status: ACTIVE | Noted: 2024-05-01

## 2024-05-01 LAB — POC HEMOGLOBIN A1C: 5.6 (ref 4.2–6.5)

## 2024-05-01 PROCEDURE — 1160F RVW MEDS BY RX/DR IN RCRD: CPT | Performed by: PHYSICIAN ASSISTANT

## 2024-05-01 PROCEDURE — 3074F SYST BP LT 130 MM HG: CPT | Performed by: PHYSICIAN ASSISTANT

## 2024-05-01 PROCEDURE — 3078F DIAST BP <80 MM HG: CPT | Performed by: PHYSICIAN ASSISTANT

## 2024-05-01 PROCEDURE — 1170F FXNL STATUS ASSESSED: CPT | Performed by: PHYSICIAN ASSISTANT

## 2024-05-01 PROCEDURE — G2211 COMPLEX E/M VISIT ADD ON: HCPCS | Performed by: PHYSICIAN ASSISTANT

## 2024-05-01 PROCEDURE — 99214 OFFICE O/P EST MOD 30 MIN: CPT | Performed by: PHYSICIAN ASSISTANT

## 2024-05-01 PROCEDURE — G0439 PPPS, SUBSEQ VISIT: HCPCS | Performed by: PHYSICIAN ASSISTANT

## 2024-05-01 PROCEDURE — 99215 OFFICE O/P EST HI 40 MIN: CPT | Performed by: PHYSICIAN ASSISTANT

## 2024-05-01 PROCEDURE — 1125F AMNT PAIN NOTED PAIN PRSNT: CPT | Performed by: PHYSICIAN ASSISTANT

## 2024-05-01 PROCEDURE — 83036 HEMOGLOBIN GLYCOSYLATED A1C: CPT | Mod: 91,MUE | Performed by: PHYSICIAN ASSISTANT

## 2024-05-01 PROCEDURE — 99397 PER PM REEVAL EST PAT 65+ YR: CPT | Performed by: PHYSICIAN ASSISTANT

## 2024-05-01 PROCEDURE — 1036F TOBACCO NON-USER: CPT | Performed by: PHYSICIAN ASSISTANT

## 2024-05-01 PROCEDURE — 1159F MED LIST DOCD IN RCRD: CPT | Performed by: PHYSICIAN ASSISTANT

## 2024-05-01 PROCEDURE — 1157F ADVNC CARE PLAN IN RCRD: CPT | Performed by: PHYSICIAN ASSISTANT

## 2024-05-01 RX ORDER — OLANZAPINE 2.5 MG/1
2.5 TABLET ORAL NIGHTLY
COMMUNITY

## 2024-05-01 RX ORDER — ASPIRIN 81 MG/1
81 TABLET ORAL DAILY
COMMUNITY

## 2024-05-01 ASSESSMENT — ACTIVITIES OF DAILY LIVING (ADL)
MANAGING_FINANCES: NEEDS ASSISTANCE
DOING_HOUSEWORK: NEEDS ASSISTANCE
GROCERY_SHOPPING: NEEDS ASSISTANCE
TAKING_MEDICATION: NEEDS ASSISTANCE
DRESSING: INDEPENDENT
BATHING: INDEPENDENT

## 2024-05-01 ASSESSMENT — LIFESTYLE VARIABLES
AUDIT-C TOTAL SCORE: 0
HOW OFTEN DO YOU HAVE A DRINK CONTAINING ALCOHOL: NEVER
HOW MANY STANDARD DRINKS CONTAINING ALCOHOL DO YOU HAVE ON A TYPICAL DAY: PATIENT DOES NOT DRINK
HOW OFTEN DO YOU HAVE SIX OR MORE DRINKS ON ONE OCCASION: NEVER
SKIP TO QUESTIONS 9-10: 1

## 2024-05-01 ASSESSMENT — ENCOUNTER SYMPTOMS
DEPRESSION: 0
OCCASIONAL FEELINGS OF UNSTEADINESS: 1
LOSS OF SENSATION IN FEET: 0

## 2024-05-01 ASSESSMENT — PAIN SCALES - GENERAL: PAINLEVEL: 7

## 2024-05-01 ASSESSMENT — PATIENT HEALTH QUESTIONNAIRE - PHQ9
SUM OF ALL RESPONSES TO PHQ9 QUESTIONS 1 AND 2: 0
2. FEELING DOWN, DEPRESSED OR HOPELESS: NOT AT ALL
1. LITTLE INTEREST OR PLEASURE IN DOING THINGS: NOT AT ALL

## 2024-05-01 NOTE — PROGRESS NOTES
"Subjective   Reason for Visit: Adriana Duran is an 78 y.o. female here for a Medicare Wellness visit.     Past Medical, Surgical, and Family History reviewed and updated in chart.    Reviewed all medications by prescribing practitioner or clinical pharmacist (such as prescriptions, OTCs, herbal therapies and supplements) and documented in the medical record.    [facility]    Patient Care Team:  Tawanda Joel PA-C as PCP - General (Internal Medicine)     Diabetes:  Not on medication for this.  Last A1C was 5.7 in Oct 2023.  POC today is 5.6.    HTN:  Taking Losartan.  BP today is 104/70.  Denies dizziness, headaches.    HLD:  Taking Atorvastatin.  Last checked Feb 2023.    Anxiety/Depression:  Taking Buspar and Celexa.  Denies SI/HI.    OA/Femur fracture:  Seeing ortho.  Occurred in Dec 2023.  Doing well!  Walking is going ok.    Elevated ferritin:  Seen in Sep 2023 and again in Feb 2024.  DUE for re-check.    Left great toe wound/Osteomyelitis:  Seeing podiatry and wound care.  Hospitalized for this multiple times over the last 6 months.  Doing very well!    Health maintenance:  Smoking: Former smoker.  Labs: Feb 2023. DUE for A1C, ferritin, lipid  Influenza:    Review of Systems  12 point review of systems negative unless stated above in HPI    Objective   Vitals:  /70   Pulse 89   Ht 1.6 m (5' 3\")   Wt 79.4 kg (175 lb)   SpO2 98%   BMI 31.00 kg/m²       Physical Exam  General: Alert and oriented, well nourished, no acute distress.  Lungs: Clear to auscultation, non-labored respiration.  Heart: Normal rate, regular rhythm, no murmur, gallop or edema.  Neurologic: Awake, alert, and oriented X3, CN II-XII intact.  Psychiatric: Cooperative, appropriate mood and affect.    Assessment/Plan   It was good seeing you!  This counts as your annual Medicare Wellness visit.  Health maintenance was reviewed today.  Depression screening is negative.  I have ordered some labs to be done as soon as you can.  We will call " you with the results.  Continue specialty care.  A1C is great today!  Continue the same medications.  Chronic conditions are stable.  Call with questions or concerns.    Follow up  6 months  Problem List Items Addressed This Visit          Cardiac and Vasculature    Mixed hyperlipidemia    Relevant Orders    Lipid Panel    HTN (hypertension), benign       Endocrine/Metabolic    Hyperglycemia due to type 2 diabetes mellitus (Multi)    Current Assessment & Plan     A1C is well-controlled. Continue diet and exercise         Controlled type 2 diabetes mellitus with complication, without long-term current use of insulin (Multi)    Current Assessment & Plan     A1C is well-controlled. Continue diet and exercise         Relevant Orders    POCT glycosylated hemoglobin (Hb A1C) manually resulted (Completed)       Genitourinary and Reproductive    Diabetic nephropathy with proteinuria (Multi)    Current Assessment & Plan     A1C is well-controlled. Continue diet and exercise            Hematology and Neoplasia    Elevated ferritin    Relevant Orders    Ferritin       Infectious Diseases    Osteomyelitis of great toe of left foot (Multi)    Current Assessment & Plan     Doing well - seeing orthopedics            Mental Health    Recurrent major depressive disorder (CMS-HCC)    Current Assessment & Plan     Mood is very good!         Anxiety       Musculoskeletal and Injuries    Pathological fracture of neck of femur (Multi)    Current Assessment & Plan     This has healed back to normal         Intertrochanteric fracture (Multi)    Current Assessment & Plan     Healing well - seeing orthopedics         Osteoarthritis of right knee    Non-pressure chronic ulcer of other part of left foot with necrosis of bone (Multi)    Current Assessment & Plan     Seeing wound care- doing very well         Ulcer of great toe (Multi)    Current Assessment & Plan     This has improved- seeing wound care            Neuro    Diabetic peripheral  neuropathy (Multi)    Current Assessment & Plan     A1C is well-controlled. Continue diet and exercise          Other Visit Diagnoses       Medicare annual wellness visit, subsequent    -  Primary    Depression screening        Routine general medical examination at health care facility

## 2024-05-08 DIAGNOSIS — E11.21 DIABETIC NEPHROPATHY WITH PROTEINURIA (MULTI): Primary | ICD-10-CM

## 2024-05-08 RX ORDER — DULOXETIN HYDROCHLORIDE 60 MG/1
60 CAPSULE, DELAYED RELEASE ORAL DAILY
Qty: 90 CAPSULE | Refills: 3 | Status: SHIPPED | OUTPATIENT
Start: 2024-05-08

## 2024-07-08 DIAGNOSIS — I10 HTN (HYPERTENSION), BENIGN: ICD-10-CM

## 2024-07-08 RX ORDER — LOSARTAN POTASSIUM 100 MG/1
100 TABLET ORAL DAILY
Qty: 90 TABLET | Refills: 3 | Status: SHIPPED | OUTPATIENT
Start: 2024-07-08

## 2024-07-11 DIAGNOSIS — N18.30 CHRONIC KIDNEY DISEASE, STAGE 3 UNSPECIFIED (MULTI): Primary | ICD-10-CM

## 2024-07-16 ENCOUNTER — TELEPHONE (OUTPATIENT)
Dept: ORTHOPEDIC SURGERY | Facility: CLINIC | Age: 79
End: 2024-07-16
Payer: MEDICARE

## 2024-07-16 DIAGNOSIS — M25.361 KNEE INSTABILITY, RIGHT: ICD-10-CM

## 2024-07-16 DIAGNOSIS — M17.11 PRIMARY OSTEOARTHRITIS OF RIGHT KNEE: ICD-10-CM

## 2024-07-16 DIAGNOSIS — M25.561 RIGHT KNEE PAIN, UNSPECIFIED CHRONICITY: ICD-10-CM

## 2024-07-24 ENCOUNTER — LAB (OUTPATIENT)
Dept: LAB | Facility: LAB | Age: 79
End: 2024-07-24
Payer: MEDICARE

## 2024-07-24 DIAGNOSIS — N18.30 CHRONIC KIDNEY DISEASE, STAGE 3 UNSPECIFIED (MULTI): ICD-10-CM

## 2024-07-24 LAB
ALBUMIN SERPL-MCNC: 3.8 G/DL (ref 3.5–5)
ANION GAP SERPL CALC-SCNC: 11 MMOL/L
BUN SERPL-MCNC: 18 MG/DL (ref 8–25)
CALCIUM SERPL-MCNC: 8.9 MG/DL (ref 8.5–10.4)
CHLORIDE SERPL-SCNC: 105 MMOL/L (ref 97–107)
CO2 SERPL-SCNC: 25 MMOL/L (ref 24–31)
CREAT SERPL-MCNC: 1.3 MG/DL (ref 0.4–1.6)
EGFRCR SERPLBLD CKD-EPI 2021: 42 ML/MIN/1.73M*2
ERYTHROCYTE [DISTWIDTH] IN BLOOD BY AUTOMATED COUNT: 13.4 % (ref 11.5–14.5)
GLUCOSE SERPL-MCNC: 146 MG/DL (ref 65–99)
HCT VFR BLD AUTO: 33.3 % (ref 36–46)
HGB BLD-MCNC: 10.4 G/DL (ref 12–16)
MCH RBC QN AUTO: 30.7 PG (ref 26–34)
MCHC RBC AUTO-ENTMCNC: 31.2 G/DL (ref 32–36)
MCV RBC AUTO: 98 FL (ref 80–100)
NRBC BLD-RTO: 0 /100 WBCS (ref 0–0)
PHOSPHATE SERPL-MCNC: 3.6 MG/DL (ref 2.5–4.5)
PLATELET # BLD AUTO: 195 X10*3/UL (ref 150–450)
POTASSIUM SERPL-SCNC: 4.1 MMOL/L (ref 3.4–5.1)
PTH-INTACT SERPL-MCNC: 68.8 PG/ML (ref 18.5–88)
RBC # BLD AUTO: 3.39 X10*6/UL (ref 4–5.2)
SODIUM SERPL-SCNC: 141 MMOL/L (ref 133–145)
WBC # BLD AUTO: 5.9 X10*3/UL (ref 4.4–11.3)

## 2024-07-24 PROCEDURE — 85027 COMPLETE CBC AUTOMATED: CPT

## 2024-07-24 PROCEDURE — 83970 ASSAY OF PARATHORMONE: CPT

## 2024-07-24 PROCEDURE — 80069 RENAL FUNCTION PANEL: CPT

## 2024-07-24 PROCEDURE — 36415 COLL VENOUS BLD VENIPUNCTURE: CPT

## 2024-08-22 ENCOUNTER — APPOINTMENT (OUTPATIENT)
Dept: UROLOGY | Facility: CLINIC | Age: 79
End: 2024-08-22
Payer: MEDICARE

## 2024-08-22 VITALS — TEMPERATURE: 97.1 F | HEIGHT: 64 IN | BODY MASS INDEX: 29.6 KG/M2 | WEIGHT: 173.4 LBS

## 2024-08-22 DIAGNOSIS — R39.15 URINARY URGENCY: Primary | ICD-10-CM

## 2024-08-22 PROCEDURE — 1157F ADVNC CARE PLAN IN RCRD: CPT | Performed by: UROLOGY

## 2024-08-22 PROCEDURE — 1159F MED LIST DOCD IN RCRD: CPT | Performed by: UROLOGY

## 2024-08-22 PROCEDURE — 99213 OFFICE O/P EST LOW 20 MIN: CPT | Performed by: UROLOGY

## 2024-08-22 PROCEDURE — 1126F AMNT PAIN NOTED NONE PRSNT: CPT | Performed by: UROLOGY

## 2024-08-22 ASSESSMENT — PAIN SCALES - GENERAL: PAINLEVEL: 0-NO PAIN

## 2024-08-22 NOTE — PROGRESS NOTES
"  Patient is a 79 y.o. female presenting with a history of urinary urgency    SUBJECTIVE:  HPI   She is a history of urgency.  She has tried Myrbetriq and Solifenacin previously.  She has CKD.  Her creatinine was 1.3 in July 2024.  8      No results found for: \"URINECULTURE\"     Past Medical History:   Diagnosis Date    Arthritis     Chronic kidney disease     Diabetes mellitus (Multi)     Foot pain, left     bunion turned to wound    Hypertension     Right knee pain     UTI (urinary tract infection)       Past Surgical History:   Procedure Laterality Date    HIP FRACTURE SURGERY Left     KNEE ARTHROPLASTY      KNEE SURGERY Left     replacement    KNEE SURGERY Right     arthritis removed    SHOULDER SURGERY Left     rotator cuff    TOTAL SHOULDER ARTHROPLASTY        Family History   Problem Relation Name Age of Onset    Heart disease Father      Cancer Father      Diabetes Paternal Grandfather        Social History     Socioeconomic History    Marital status:    Tobacco Use    Smoking status: Former     Types: Cigarettes    Smokeless tobacco: Never   Vaping Use    Vaping status: Never Used   Substance and Sexual Activity    Alcohol use: Never    Drug use: Never    Sexual activity: Defer     Social Determinants of Health     Financial Resource Strain: Low Risk  (12/29/2023)    Overall Financial Resource Strain (CARDIA)     Difficulty of Paying Living Expenses: Not very hard   Transportation Needs: No Transportation Needs (12/29/2023)    PRAPARE - Transportation     Lack of Transportation (Medical): No     Lack of Transportation (Non-Medical): No   Housing Stability: Low Risk  (12/29/2023)    Housing Stability Vital Sign     Unable to Pay for Housing in the Last Year: No     Number of Places Lived in the Last Year: 1     Unstable Housing in the Last Year: No        Review of Systems   Constitutional: denies any unintentional weight loss or change in strength.  Integumentary: denies any rashes or " "pruritus.  Eyes: denies any double vision or eye pain.  Ear/Nose/Mouth/Throat: denies any nosebleeds or gum bleeds.  Cardiovascular: denies any chest pain or syncope.  Respiratory: denies hemoptysis.  Gastrointestinal: denies nausea or vomiting.  Musculoskeletal: denies muscle cramping or weakness.  Neurologic: denies convulsions or seizures.  Hematologic/Lymphatic: denies bleeding tendencies.  Endocrine: denies heat/cold intolerance.  All other systems have been reviewed and are negative unless otherwise noted in the HPI.    OBJECTIVE:  Visit Vitals  Temp 36.2 °C (97.1 °F)     Physical Exam   Constitutional: No obvious distress.  Eyes: Non-injected conjunctiva, sclera clear, EOMI.  Ears/Nose/Mouth/Throat: No obvious drainage per ears or nose.  Cardiovascular: Extremities are warm and well perfused. No edema, cyanosis or pallor.  Respiratory: No audible wheezing/stridor; respirations do not appear labored.  Gastrointestinal: Abdomen soft, not distended.  Musculoskeletal: Normal ROM of extremities.  Skin: No obvious rashes or open sores.  Neurologic: Alert and oriented, CN 2-12 grossly intact.  Psychiatric: Answers questions appropriately with normal affect.  Hematologic/Lymphatic/Immunologic: No obvious bruises or sites of spontaneous bleeding.  Genitourinary: No CVA tenderness, bladder not palpable.     Labs:  Lab Results   Component Value Date    WBC 5.9 07/24/2024    HGB 10.4 (L) 07/24/2024    HCT 33.3 (L) 07/24/2024     07/24/2024    CHOL 131 (L) 02/21/2023    TRIG 147 02/21/2023    HDL 36 (L) 02/21/2023    ALT 16 12/28/2023    AST 26 12/28/2023     07/24/2024    K 4.1 07/24/2024     07/24/2024    CREATININE 1.30 07/24/2024    BUN 18 07/24/2024    CO2 25 07/24/2024    TSH 1.95 09/06/2023    INR 1.0 12/28/2023    HGBA1C 5.6 05/01/2024    ALBUR 80 (H) 02/19/2021     No results found for: \"KPSAT\", \"KPSAP\"  IMAGING:        PROCEDURES:    ASSESSMENT/PLAN:  Problem List Items Addressed This Visit  "      Urinary urgency - Primary      She has a history of urinary urgency.  She has tried Myrbetriq and Solifenacin.  We reviewed her voiding and options and will monitor closely .  She will return in 4 months.    All questions were answered to the patient’s satisfaction.  Patient agrees with the plan and wishes to proceed.  Follow-up will be scheduled appropriately.     Rafael Ruiz MD

## 2024-08-24 PROBLEM — R39.15 URINARY URGENCY: Status: ACTIVE | Noted: 2024-08-24

## 2024-09-19 NOTE — PROGRESS NOTES
Subjective   Reason for Visit: Adriana Duran is an 79 y.o. female here for a follow up    HPI  Patient Care Team:  Tawanda Joel PA-C as PCP - General (Internal Medicine)     Diabetes:  Not on medication for this.  Last A1C was 5.6 in May 2024.  POC today is    HTN:  Taking Losartan.  BP today is   Denies dizziness, headaches.    HLD:  Taking Atorvastatin.  Last checked Feb 2023.  DUE for lipid.    Anxiety/Depression:  Taking Buspar and Celexa.  Denies SI/HI.    OA/Femur fracture:  Seeing ortho.  Occurred in Dec 2023.  Doing well!  Walking is going ok.    Elevated ferritin:  Seen in Sep 2023 and again in Feb 2024.  DUE for re-check.    Left great toe wound/Osteomyelitis:  Seeing podiatry and wound care.  Hospitalized for this multiple times over the last 6 months.  Doing very well!    Health maintenance:  Smoking: Former smoker.  Labs: Feb 2023. DUE  Influenza:    Review of Systems  12 point review of systems negative unless stated above in HPI    Objective   Vitals:  There were no vitals taken for this visit.      Physical Exam  General: Alert and oriented, well nourished, no acute distress.  Lungs: Clear to auscultation, non-labored respiration.  Heart: Normal rate, regular rhythm, no murmur, gallop or edema.  Neurologic: Awake, alert, and oriented X3, CN II-XII intact.  Psychiatric: Cooperative, appropriate mood and affect.    Assessment/Plan     Problem List Items Addressed This Visit          Cardiac and Vasculature    Mixed hyperlipidemia    HTN (hypertension), benign       Endocrine/Metabolic    Hyperglycemia due to type 2 diabetes mellitus (Multi)    Controlled type 2 diabetes mellitus with complication, without long-term current use of insulin (Multi) - Primary       Genitourinary and Reproductive    Diabetic nephropathy with proteinuria (Multi)       Hematology and Neoplasia    Elevated ferritin       Infectious Diseases    Osteomyelitis of great toe of left foot (Multi)       Mental Health     Recurrent major depressive disorder (CMS-HCC)    Anxiety       Musculoskeletal and Injuries    Non-pressure chronic ulcer of other part of left foot with necrosis of bone (Multi)    Ulcer of great toe (Multi)       Neuro    Diabetic peripheral neuropathy (Multi)

## 2024-09-22 ENCOUNTER — APPOINTMENT (OUTPATIENT)
Dept: RADIOLOGY | Facility: HOSPITAL | Age: 79
DRG: 152 | End: 2024-09-22
Payer: MEDICARE

## 2024-09-22 ENCOUNTER — HOSPITAL ENCOUNTER (INPATIENT)
Facility: HOSPITAL | Age: 79
DRG: 152 | End: 2024-09-22
Attending: STUDENT IN AN ORGANIZED HEALTH CARE EDUCATION/TRAINING PROGRAM | Admitting: INTERNAL MEDICINE
Payer: MEDICARE

## 2024-09-22 VITALS
HEIGHT: 66 IN | DIASTOLIC BLOOD PRESSURE: 70 MMHG | SYSTOLIC BLOOD PRESSURE: 121 MMHG | RESPIRATION RATE: 19 BRPM | BODY MASS INDEX: 27.8 KG/M2 | OXYGEN SATURATION: 96 % | HEART RATE: 80 BPM | TEMPERATURE: 98.4 F | WEIGHT: 173 LBS
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
## 2024-09-22 DIAGNOSIS — J01.40 ACUTE NON-RECURRENT PANSINUSITIS: ICD-10-CM

## 2024-09-22 DIAGNOSIS — R41.0 DELIRIUM: Primary | ICD-10-CM

## 2024-09-22 LAB
ALBUMIN SERPL BCP-MCNC: 4.1 G/DL (ref 3.4–5)
ALP SERPL-CCNC: 69 U/L (ref 33–136)
ALT SERPL W P-5'-P-CCNC: 9 U/L (ref 7–45)
ANION GAP SERPL CALCULATED.3IONS-SCNC: 15 MMOL/L (ref 10–20)
APPEARANCE UR: CLEAR
AST SERPL W P-5'-P-CCNC: 14 U/L (ref 9–39)
BACTERIA #/AREA URNS AUTO: ABNORMAL /HPF
BASOPHILS # BLD AUTO: 0.04 X10*3/UL (ref 0–0.1)
BASOPHILS NFR BLD AUTO: 0.4 %
BILIRUB SERPL-MCNC: 0.9 MG/DL (ref 0–1.2)
BILIRUB UR STRIP.AUTO-MCNC: NEGATIVE MG/DL
BUN SERPL-MCNC: 21 MG/DL (ref 6–23)
CALCIUM SERPL-MCNC: 9.5 MG/DL (ref 8.6–10.3)
CHLORIDE SERPL-SCNC: 105 MMOL/L (ref 98–107)
CO2 SERPL-SCNC: 24 MMOL/L (ref 21–32)
COLOR UR: ABNORMAL
CREAT SERPL-MCNC: 1.26 MG/DL (ref 0.5–1.05)
EGFRCR SERPLBLD CKD-EPI 2021: 43 ML/MIN/1.73M*2
EOSINOPHIL # BLD AUTO: 0.21 X10*3/UL (ref 0–0.4)
EOSINOPHIL NFR BLD AUTO: 2.4 %
ERYTHROCYTE [DISTWIDTH] IN BLOOD BY AUTOMATED COUNT: 13.7 % (ref 11.5–14.5)
GLUCOSE BLD MANUAL STRIP-MCNC: 98 MG/DL (ref 74–99)
GLUCOSE SERPL-MCNC: 117 MG/DL (ref 74–99)
GLUCOSE UR STRIP.AUTO-MCNC: NORMAL MG/DL
HCT VFR BLD AUTO: 35.2 % (ref 36–46)
HGB BLD-MCNC: 11.2 G/DL (ref 12–16)
HOLD SPECIMEN: NORMAL
IMM GRANULOCYTES # BLD AUTO: 0.04 X10*3/UL (ref 0–0.5)
IMM GRANULOCYTES NFR BLD AUTO: 0.4 % (ref 0–0.9)
KETONES UR STRIP.AUTO-MCNC: NEGATIVE MG/DL
LEUKOCYTE ESTERASE UR QL STRIP.AUTO: ABNORMAL
LIPASE SERPL-CCNC: 6 U/L (ref 9–82)
LYMPHOCYTES # BLD AUTO: 1.17 X10*3/UL (ref 0.8–3)
LYMPHOCYTES NFR BLD AUTO: 13.1 %
MCH RBC QN AUTO: 30.5 PG (ref 26–34)
MCHC RBC AUTO-ENTMCNC: 31.8 G/DL (ref 32–36)
MCV RBC AUTO: 96 FL (ref 80–100)
MONOCYTES # BLD AUTO: 0.69 X10*3/UL (ref 0.05–0.8)
MONOCYTES NFR BLD AUTO: 7.7 %
NEUTROPHILS # BLD AUTO: 6.78 X10*3/UL (ref 1.6–5.5)
NEUTROPHILS NFR BLD AUTO: 76 %
NITRITE UR QL STRIP.AUTO: NEGATIVE
NRBC BLD-RTO: 0 /100 WBCS (ref 0–0)
PH UR STRIP.AUTO: 7 [PH]
PLATELET # BLD AUTO: 164 X10*3/UL (ref 150–450)
POTASSIUM SERPL-SCNC: 3.9 MMOL/L (ref 3.5–5.3)
PROT SERPL-MCNC: 7.3 G/DL (ref 6.4–8.2)
PROT UR STRIP.AUTO-MCNC: NEGATIVE MG/DL
RBC # BLD AUTO: 3.67 X10*6/UL (ref 4–5.2)
RBC # UR STRIP.AUTO: NEGATIVE /UL
RBC #/AREA URNS AUTO: ABNORMAL /HPF
SODIUM SERPL-SCNC: 140 MMOL/L (ref 136–145)
SP GR UR STRIP.AUTO: 1.01
SQUAMOUS #/AREA URNS AUTO: ABNORMAL /HPF
UROBILINOGEN UR STRIP.AUTO-MCNC: ABNORMAL MG/DL
WBC # BLD AUTO: 8.9 X10*3/UL (ref 4.4–11.3)
WBC #/AREA URNS AUTO: ABNORMAL /HPF

## 2024-09-22 PROCEDURE — 36415 COLL VENOUS BLD VENIPUNCTURE: CPT | Performed by: STUDENT IN AN ORGANIZED HEALTH CARE EDUCATION/TRAINING PROGRAM

## 2024-09-22 PROCEDURE — 80053 COMPREHEN METABOLIC PANEL: CPT | Performed by: STUDENT IN AN ORGANIZED HEALTH CARE EDUCATION/TRAINING PROGRAM

## 2024-09-22 PROCEDURE — 36415 COLL VENOUS BLD VENIPUNCTURE: CPT | Performed by: INTERNAL MEDICINE

## 2024-09-22 PROCEDURE — 83036 HEMOGLOBIN GLYCOSYLATED A1C: CPT | Mod: WESLAB | Performed by: INTERNAL MEDICINE

## 2024-09-22 PROCEDURE — 1210000001 HC SEMI-PRIVATE ROOM DAILY

## 2024-09-22 PROCEDURE — 85025 COMPLETE CBC W/AUTO DIFF WBC: CPT | Performed by: STUDENT IN AN ORGANIZED HEALTH CARE EDUCATION/TRAINING PROGRAM

## 2024-09-22 PROCEDURE — 71045 X-RAY EXAM CHEST 1 VIEW: CPT

## 2024-09-22 PROCEDURE — 2500000004 HC RX 250 GENERAL PHARMACY W/ HCPCS (ALT 636 FOR OP/ED): Performed by: INTERNAL MEDICINE

## 2024-09-22 PROCEDURE — 71045 X-RAY EXAM CHEST 1 VIEW: CPT | Performed by: RADIOLOGY

## 2024-09-22 PROCEDURE — 70450 CT HEAD/BRAIN W/O DYE: CPT | Performed by: STUDENT IN AN ORGANIZED HEALTH CARE EDUCATION/TRAINING PROGRAM

## 2024-09-22 PROCEDURE — 70450 CT HEAD/BRAIN W/O DYE: CPT

## 2024-09-22 PROCEDURE — 87086 URINE CULTURE/COLONY COUNT: CPT | Mod: WESLAB | Performed by: STUDENT IN AN ORGANIZED HEALTH CARE EDUCATION/TRAINING PROGRAM

## 2024-09-22 PROCEDURE — 99223 1ST HOSP IP/OBS HIGH 75: CPT | Performed by: INTERNAL MEDICINE

## 2024-09-22 PROCEDURE — 81001 URINALYSIS AUTO W/SCOPE: CPT | Performed by: STUDENT IN AN ORGANIZED HEALTH CARE EDUCATION/TRAINING PROGRAM

## 2024-09-22 PROCEDURE — 2500000001 HC RX 250 WO HCPCS SELF ADMINISTERED DRUGS (ALT 637 FOR MEDICARE OP): Performed by: STUDENT IN AN ORGANIZED HEALTH CARE EDUCATION/TRAINING PROGRAM

## 2024-09-22 PROCEDURE — 82947 ASSAY GLUCOSE BLOOD QUANT: CPT

## 2024-09-22 PROCEDURE — 99285 EMERGENCY DEPT VISIT HI MDM: CPT

## 2024-09-22 PROCEDURE — 2500000002 HC RX 250 W HCPCS SELF ADMINISTERED DRUGS (ALT 637 FOR MEDICARE OP, ALT 636 FOR OP/ED): Performed by: INTERNAL MEDICINE

## 2024-09-22 PROCEDURE — 83690 ASSAY OF LIPASE: CPT | Performed by: STUDENT IN AN ORGANIZED HEALTH CARE EDUCATION/TRAINING PROGRAM

## 2024-09-22 PROCEDURE — 2500000001 HC RX 250 WO HCPCS SELF ADMINISTERED DRUGS (ALT 637 FOR MEDICARE OP): Performed by: INTERNAL MEDICINE

## 2024-09-22 RX ORDER — CEFTRIAXONE 1 G/50ML
1 INJECTION, SOLUTION INTRAVENOUS EVERY 24 HOURS
Status: DISCONTINUED | OUTPATIENT
Start: 2024-09-22 | End: 2024-09-23

## 2024-09-22 RX ORDER — CITALOPRAM 40 MG/1
40 TABLET, FILM COATED ORAL DAILY
Status: DISCONTINUED | OUTPATIENT
Start: 2024-09-22 | End: 2024-09-25 | Stop reason: HOSPADM

## 2024-09-22 RX ORDER — BUSPIRONE HYDROCHLORIDE 10 MG/1
10 TABLET ORAL 3 TIMES DAILY
Status: DISCONTINUED | OUTPATIENT
Start: 2024-09-22 | End: 2024-09-25 | Stop reason: HOSPADM

## 2024-09-22 RX ORDER — AMOXICILLIN AND CLAVULANATE POTASSIUM 875; 125 MG/1; MG/1
1 TABLET, FILM COATED ORAL ONCE
Status: COMPLETED | OUTPATIENT
Start: 2024-09-22 | End: 2024-09-22

## 2024-09-22 RX ORDER — HEPARIN SODIUM 5000 [USP'U]/ML
5000 INJECTION, SOLUTION INTRAVENOUS; SUBCUTANEOUS EVERY 12 HOURS
Status: DISCONTINUED | OUTPATIENT
Start: 2024-09-22 | End: 2024-09-25 | Stop reason: HOSPADM

## 2024-09-22 RX ORDER — DEXTROSE 50 % IN WATER (D50W) INTRAVENOUS SYRINGE
25
Status: DISCONTINUED | OUTPATIENT
Start: 2024-09-22 | End: 2024-09-25 | Stop reason: HOSPADM

## 2024-09-22 RX ORDER — DOCUSATE SODIUM 100 MG/1
100 CAPSULE, LIQUID FILLED ORAL 2 TIMES DAILY PRN
Status: DISCONTINUED | OUTPATIENT
Start: 2024-09-22 | End: 2024-09-25 | Stop reason: HOSPADM

## 2024-09-22 RX ORDER — SODIUM BICARBONATE 650 MG/1
650 TABLET ORAL 3 TIMES DAILY
Status: DISCONTINUED | OUTPATIENT
Start: 2024-09-22 | End: 2024-09-25 | Stop reason: HOSPADM

## 2024-09-22 RX ORDER — OLANZAPINE 2.5 MG/1
2.5 TABLET ORAL NIGHTLY
Status: DISCONTINUED | OUTPATIENT
Start: 2024-09-22 | End: 2024-09-25 | Stop reason: HOSPADM

## 2024-09-22 RX ORDER — ACETAMINOPHEN 500 MG
5 TABLET ORAL NIGHTLY PRN
Status: DISCONTINUED | OUTPATIENT
Start: 2024-09-22 | End: 2024-09-25 | Stop reason: HOSPADM

## 2024-09-22 RX ORDER — ONDANSETRON 4 MG/1
4 TABLET, FILM COATED ORAL 4 TIMES DAILY PRN
Status: DISCONTINUED | OUTPATIENT
Start: 2024-09-22 | End: 2024-09-25 | Stop reason: HOSPADM

## 2024-09-22 RX ORDER — DULOXETIN HYDROCHLORIDE 60 MG/1
60 CAPSULE, DELAYED RELEASE ORAL DAILY
Status: DISCONTINUED | OUTPATIENT
Start: 2024-09-22 | End: 2024-09-25 | Stop reason: HOSPADM

## 2024-09-22 RX ORDER — ASPIRIN 81 MG/1
81 TABLET ORAL DAILY
Status: DISCONTINUED | OUTPATIENT
Start: 2024-09-22 | End: 2024-09-25 | Stop reason: HOSPADM

## 2024-09-22 RX ORDER — GUAIFENESIN 100 MG/5ML
200 SOLUTION ORAL EVERY 4 HOURS PRN
Status: DISCONTINUED | OUTPATIENT
Start: 2024-09-22 | End: 2024-09-25 | Stop reason: HOSPADM

## 2024-09-22 RX ORDER — INSULIN LISPRO 100 [IU]/ML
0-5 INJECTION, SOLUTION INTRAVENOUS; SUBCUTANEOUS
Status: DISCONTINUED | OUTPATIENT
Start: 2024-09-22 | End: 2024-09-25 | Stop reason: HOSPADM

## 2024-09-22 RX ORDER — ONDANSETRON HYDROCHLORIDE 2 MG/ML
4 INJECTION, SOLUTION INTRAVENOUS EVERY 6 HOURS PRN
Status: DISCONTINUED | OUTPATIENT
Start: 2024-09-22 | End: 2024-09-25 | Stop reason: HOSPADM

## 2024-09-22 RX ORDER — ALUMINUM HYDROXIDE, MAGNESIUM HYDROXIDE, AND SIMETHICONE 1200; 120; 1200 MG/30ML; MG/30ML; MG/30ML
30 SUSPENSION ORAL 4 TIMES DAILY PRN
Status: DISCONTINUED | OUTPATIENT
Start: 2024-09-22 | End: 2024-09-25 | Stop reason: HOSPADM

## 2024-09-22 RX ORDER — PNV NO.95/FERROUS FUM/FOLIC AC 28MG-0.8MG
100 TABLET ORAL DAILY
Status: DISCONTINUED | OUTPATIENT
Start: 2024-09-22 | End: 2024-09-25 | Stop reason: HOSPADM

## 2024-09-22 RX ORDER — DEXTROSE 50 % IN WATER (D50W) INTRAVENOUS SYRINGE
12.5
Status: DISCONTINUED | OUTPATIENT
Start: 2024-09-22 | End: 2024-09-25 | Stop reason: HOSPADM

## 2024-09-22 RX ORDER — LOSARTAN POTASSIUM 100 MG/1
100 TABLET ORAL DAILY
Status: DISCONTINUED | OUTPATIENT
Start: 2024-09-22 | End: 2024-09-25 | Stop reason: HOSPADM

## 2024-09-22 RX ORDER — AZITHROMYCIN 500 MG/1
500 TABLET, FILM COATED ORAL
Status: DISCONTINUED | OUTPATIENT
Start: 2024-09-22 | End: 2024-09-23

## 2024-09-22 RX ORDER — ATORVASTATIN CALCIUM 20 MG/1
20 TABLET, FILM COATED ORAL NIGHTLY
Status: DISCONTINUED | OUTPATIENT
Start: 2024-09-22 | End: 2024-09-25 | Stop reason: HOSPADM

## 2024-09-22 RX ORDER — ACETAMINOPHEN 325 MG/1
650 TABLET ORAL EVERY 6 HOURS PRN
Status: DISCONTINUED | OUTPATIENT
Start: 2024-09-22 | End: 2024-09-25 | Stop reason: HOSPADM

## 2024-09-22 RX ADMIN — OLANZAPINE 2.5 MG: 5 TABLET, FILM COATED ORAL at 20:44

## 2024-09-22 RX ADMIN — SODIUM BICARBONATE 650 MG TABLET 650 MG: at 16:56

## 2024-09-22 RX ADMIN — SODIUM BICARBONATE 650 MG TABLET 650 MG: at 20:43

## 2024-09-22 RX ADMIN — BUSPIRONE HYDROCHLORIDE 10 MG: 10 TABLET ORAL at 20:44

## 2024-09-22 RX ADMIN — DULOXETINE HYDROCHLORIDE 60 MG: 60 CAPSULE, DELAYED RELEASE ORAL at 17:54

## 2024-09-22 RX ADMIN — ASPIRIN 81 MG: 81 TABLET, COATED ORAL at 16:56

## 2024-09-22 RX ADMIN — HEPARIN SODIUM 5000 UNITS: 5000 INJECTION, SOLUTION INTRAVENOUS; SUBCUTANEOUS at 17:54

## 2024-09-22 RX ADMIN — DOCUSATE SODIUM 100 MG: 100 CAPSULE, LIQUID FILLED ORAL at 16:59

## 2024-09-22 RX ADMIN — VITAM B12 100 MCG: 100 TAB at 16:56

## 2024-09-22 RX ADMIN — AZITHROMYCIN DIHYDRATE 500 MG: 500 TABLET ORAL at 16:56

## 2024-09-22 RX ADMIN — CITALOPRAM 40 MG: 40 TABLET, FILM COATED ORAL at 17:56

## 2024-09-22 RX ADMIN — AMOXICILLIN AND CLAVULANATE POTASSIUM 1 TABLET: 875; 125 TABLET, FILM COATED ORAL at 13:40

## 2024-09-22 RX ADMIN — LOSARTAN POTASSIUM 100 MG: 100 TABLET, FILM COATED ORAL at 16:56

## 2024-09-22 RX ADMIN — CEFTRIAXONE SODIUM 1 G: 1 INJECTION, SOLUTION INTRAVENOUS at 16:56

## 2024-09-22 RX ADMIN — ATORVASTATIN CALCIUM 20 MG: 20 TABLET, FILM COATED ORAL at 20:43

## 2024-09-22 RX ADMIN — BUSPIRONE HYDROCHLORIDE 10 MG: 10 TABLET ORAL at 16:56

## 2024-09-22 SDOH — HEALTH STABILITY: MENTAL HEALTH
HOW OFTEN DO YOU NEED TO HAVE SOMEONE HELP YOU WHEN YOU READ INSTRUCTIONS, PAMPHLETS, OR OTHER WRITTEN MATERIAL FROM YOUR DOCTOR OR PHARMACY?: RARELY

## 2024-09-22 SDOH — ECONOMIC STABILITY: FOOD INSECURITY: WITHIN THE PAST 12 MONTHS, THE FOOD YOU BOUGHT JUST DIDN'T LAST AND YOU DIDN'T HAVE MONEY TO GET MORE.: NEVER TRUE

## 2024-09-22 SDOH — ECONOMIC STABILITY: TRANSPORTATION INSECURITY
IN THE PAST 12 MONTHS, HAS LACK OF TRANSPORTATION KEPT YOU FROM MEETINGS, WORK, OR FROM GETTING THINGS NEEDED FOR DAILY LIVING?: NO

## 2024-09-22 SDOH — HEALTH STABILITY: MENTAL HEALTH: HOW MANY STANDARD DRINKS CONTAINING ALCOHOL DO YOU HAVE ON A TYPICAL DAY?: PATIENT DOES NOT DRINK

## 2024-09-22 SDOH — HEALTH STABILITY: MENTAL HEALTH: HOW OFTEN DO YOU HAVE A DRINK CONTAINING ALCOHOL?: NEVER

## 2024-09-22 SDOH — SOCIAL STABILITY: SOCIAL INSECURITY: WITHIN THE LAST YEAR, HAVE YOU BEEN HUMILIATED OR EMOTIONALLY ABUSED IN OTHER WAYS BY YOUR PARTNER OR EX-PARTNER?: NO

## 2024-09-22 SDOH — SOCIAL STABILITY: SOCIAL NETWORK: ARE YOU MARRIED, WIDOWED, DIVORCED, SEPARATED, NEVER MARRIED, OR LIVING WITH A PARTNER?: MARRIED

## 2024-09-22 SDOH — ECONOMIC STABILITY: HOUSING INSECURITY: AT ANY TIME IN THE PAST 12 MONTHS, WERE YOU HOMELESS OR LIVING IN A SHELTER (INCLUDING NOW)?: NO

## 2024-09-22 SDOH — HEALTH STABILITY: MENTAL HEALTH
STRESS IS WHEN SOMEONE FEELS TENSE, NERVOUS, ANXIOUS, OR CAN'T SLEEP AT NIGHT BECAUSE THEIR MIND IS TROUBLED. HOW STRESSED ARE YOU?: NOT AT ALL

## 2024-09-22 SDOH — ECONOMIC STABILITY: INCOME INSECURITY: IN THE PAST 12 MONTHS, HAS THE ELECTRIC, GAS, OIL, OR WATER COMPANY THREATENED TO SHUT OFF SERVICE IN YOUR HOME?: NO

## 2024-09-22 SDOH — HEALTH STABILITY: PHYSICAL HEALTH: ON AVERAGE, HOW MANY DAYS PER WEEK DO YOU ENGAGE IN MODERATE TO STRENUOUS EXERCISE (LIKE A BRISK WALK)?: 0 DAYS

## 2024-09-22 SDOH — SOCIAL STABILITY: SOCIAL NETWORK: HOW OFTEN DO YOU ATTEND CHURCH OR RELIGIOUS SERVICES?: NEVER

## 2024-09-22 SDOH — ECONOMIC STABILITY: FOOD INSECURITY: WITHIN THE PAST 12 MONTHS, YOU WORRIED THAT YOUR FOOD WOULD RUN OUT BEFORE YOU GOT MONEY TO BUY MORE.: NEVER TRUE

## 2024-09-22 SDOH — ECONOMIC STABILITY: INCOME INSECURITY: IN THE LAST 12 MONTHS, WAS THERE A TIME WHEN YOU WERE NOT ABLE TO PAY THE MORTGAGE OR RENT ON TIME?: NO

## 2024-09-22 SDOH — HEALTH STABILITY: MENTAL HEALTH: HOW OFTEN DO YOU HAVE 6 OR MORE DRINKS ON ONE OCCASION?: NEVER

## 2024-09-22 SDOH — SOCIAL STABILITY: SOCIAL INSECURITY: WITHIN THE LAST YEAR, HAVE YOU BEEN AFRAID OF YOUR PARTNER OR EX-PARTNER?: NO

## 2024-09-22 SDOH — ECONOMIC STABILITY: HOUSING INSECURITY: IN THE PAST 12 MONTHS, HOW MANY TIMES HAVE YOU MOVED WHERE YOU WERE LIVING?: 0

## 2024-09-22 SDOH — ECONOMIC STABILITY: TRANSPORTATION INSECURITY
IN THE PAST 12 MONTHS, HAS THE LACK OF TRANSPORTATION KEPT YOU FROM MEDICAL APPOINTMENTS OR FROM GETTING MEDICATIONS?: NO

## 2024-09-22 SDOH — SOCIAL STABILITY: SOCIAL INSECURITY
WITHIN THE LAST YEAR, HAVE TO BEEN RAPED OR FORCED TO HAVE ANY KIND OF SEXUAL ACTIVITY BY YOUR PARTNER OR EX-PARTNER?: NO

## 2024-09-22 SDOH — SOCIAL STABILITY: SOCIAL INSECURITY
WITHIN THE LAST YEAR, HAVE YOU BEEN KICKED, HIT, SLAPPED, OR OTHERWISE PHYSICALLY HURT BY YOUR PARTNER OR EX-PARTNER?: NO

## 2024-09-22 SDOH — SOCIAL STABILITY: SOCIAL NETWORK
IN A TYPICAL WEEK, HOW MANY TIMES DO YOU TALK ON THE PHONE WITH FAMILY, FRIENDS, OR NEIGHBORS?: MORE THAN THREE TIMES A WEEK

## 2024-09-22 SDOH — ECONOMIC STABILITY: INCOME INSECURITY: HOW HARD IS IT FOR YOU TO PAY FOR THE VERY BASICS LIKE FOOD, HOUSING, MEDICAL CARE, AND HEATING?: NOT HARD AT ALL

## 2024-09-22 SDOH — SOCIAL STABILITY: SOCIAL NETWORK: HOW OFTEN DO YOU GET TOGETHER WITH FRIENDS OR RELATIVES?: ONCE A WEEK

## 2024-09-22 SDOH — SOCIAL STABILITY: SOCIAL NETWORK: HOW OFTEN DO YOU ATTENT MEETINGS OF THE CLUB OR ORGANIZATION YOU BELONG TO?: NEVER

## 2024-09-22 SDOH — HEALTH STABILITY: PHYSICAL HEALTH: ON AVERAGE, HOW MANY MINUTES DO YOU ENGAGE IN EXERCISE AT THIS LEVEL?: 0 MIN

## 2024-09-22 SDOH — SOCIAL STABILITY: SOCIAL NETWORK
DO YOU BELONG TO ANY CLUBS OR ORGANIZATIONS SUCH AS CHURCH GROUPS UNIONS, FRATERNAL OR ATHLETIC GROUPS, OR SCHOOL GROUPS?: NO

## 2024-09-22 ASSESSMENT — LIFESTYLE VARIABLES
AUDIT-C TOTAL SCORE: 0
SKIP TO QUESTIONS 9-10: 1

## 2024-09-22 ASSESSMENT — COLUMBIA-SUICIDE SEVERITY RATING SCALE - C-SSRS
1. IN THE PAST MONTH, HAVE YOU WISHED YOU WERE DEAD OR WISHED YOU COULD GO TO SLEEP AND NOT WAKE UP?: NO
2. HAVE YOU ACTUALLY HAD ANY THOUGHTS OF KILLING YOURSELF?: NO
6. HAVE YOU EVER DONE ANYTHING, STARTED TO DO ANYTHING, OR PREPARED TO DO ANYTHING TO END YOUR LIFE?: NO

## 2024-09-22 ASSESSMENT — PAIN SCALES - GENERAL
PAINLEVEL_OUTOF10: 3
PAINLEVEL_OUTOF10: 0 - NO PAIN

## 2024-09-22 ASSESSMENT — PAIN - FUNCTIONAL ASSESSMENT: PAIN_FUNCTIONAL_ASSESSMENT: 0-10

## 2024-09-22 NOTE — PROGRESS NOTES
Adriana Duran is a 79 y.o. female on day 0 of admission presenting with Delirium.       09/22/24 2175   Discharge Planning   Living Arrangements Spouse/significant other   Support Systems Spouse/significant other;Children;Friends/neighbors   Assistance Needed C   Type of Residence Private residence   Number of Stairs to Enter Residence 0   Number of Stairs Within Residence 0   Do you have animals or pets at home? No   Who is requesting discharge planning? Provider   Home or Post Acute Services In home services   Type of Home Care Services Home health aide;Home nursing visits;Home OT;Home PT   Expected Discharge Disposition Home H   Does the patient need discharge transport arranged? No   Patient Choice   Provider Choice list and CMS website (https://medicare.gov/care-compare#search) for post-acute Quality and Resource Measure Data were provided and reviewed with: Family   Patient / Family choosing to utilize agency / facility established prior to hospitalization No         Janet Cruz RN

## 2024-09-22 NOTE — H&P
History Of Present Illness  Adriana Duran is a 79 y.o. female presenting with altered mental status, moist cough.  Has significant dementia so she cannot contribute to the history total.  During exam, she denies any active problems.  Specifically, she denies any pain.  Your  is at bedside and he is helping with history.  He is her primary caregiver.  According to him, during close to 3 days patient had severe moist cough.  She was getting more confused and combative.  Last night, she was talking to herself and talking nonsense 4 hours so he decided to bring her to the hospital since this happened in the past when she was sick with infections.  Patient has a history of falls in the past and multiple broken bones he was worried that she might fall.  Patient did not have nausea or emesis, she urinated 3 times last night, was incontinent of urine which is not normal for her.  Her appetite was good.  Past Medical History  She has a past medical history of Arthritis, Chronic kidney disease, Diabetes mellitus (Multi), Foot pain, left, Hypertension, Right knee pain, and UTI (urinary tract infection).  According to the , patient takes antihyperglycemic medications but I do not see any diabetes medications from her home medications.  Advanced dementia  Anxiety  Depression  Unsteady gait  Falls  Surgical History  She has a past surgical history that includes Hip fracture surgery (Left); Knee surgery (Left); Knee surgery (Right); Shoulder surgery (Left); Total shoulder arthroplasty; and Knee Arthroplasty.     Social History  She reports that she has quit smoking. Her smoking use included cigarettes. She has never used smokeless tobacco. She reports that she does not drink alcohol and does not use drugs.  Lives at home with her , ambulates with a walker.  Family History  Family History   Problem Relation Name Age of Onset    Heart disease Father      Cancer Father      Diabetes Paternal Grandfather           Allergies  Strawberry, Ibuprofen, and Metformin    Review of Systems  Patient cannot provide.  Please see past medical history for the details in history of present illness.  Physical Exam  Location: Emergency department, room 5.  Patient's family, and daughter, is at the bedside.  Pi is in no apparent distress, except moist cough.  No nasal discharge.  Cooperative with exam.  Nontoxic-appearing.  Comfortable at rest on room air.  Skin is clean, dry.  No skin lesions, rashes, ecchymosis.  Head is atraumatic, normocephalic.  No tenderness to palpation of the frontal and lower sinuses.  Mouth mucosa is pink and dry.  No mucosal lesions.  No nasal discharge.  Musculoskeletal: No deformities, no muscle swelling.  No point tenderness to palpation.  Neck is supple, no JVD, no carotid bruits.  No lymphadenopathy.  Chest is atraumatic.  No tenderness to palpation.  Lungs are clear to auscultation bilaterally.  Diminished at bases more the right base ,no wheezes, no rales, no rhonchi.  Heart: Regular rate and rhythm S1-S2.  No murmurs, rubs, gallops.  Peripheral pulses are palpable in all extremities, equal.  Abdomen is soft, not tender, not distended.  Bowel sounds positive in all quadrants.  No rebound, no guarding.  Rectal exam deferred.  Extremities: No peripheral edema, cords, cyanosis, varices.  Neuro: Patient is alert, oriented to name only.  Very pleasant.  Moves all extremities.  No gross focal neurological deficits.  Face is symmetrical.  Tongue is midline.  No visual abnormalities.  No dysarthria or aphasia.  Psychiatric: Patient is cooperative with exam, maintains good eye contact.  No evidence of psychosis, suicidal ideation or depression.   Last Recorded Vitals  /71 (Patient Position: Lying)   Pulse 73   Temp 36.9 °C (98.4 °F) (Temporal)   Resp 20   Wt 78.5 kg (173 lb)   SpO2 96%     Relevant Results        XR chest 1 view    Result Date: 9/22/2024  Interpreted By:  Dionte Ramos,  STUDY: XR CHEST 1 VIEW;  9/22/2024 2:33 pm   INDICATION: Signs/Symptoms:cough, sinus infection.     COMPARISON: April 19, 2018 chest CT, December 28, 2023 chest radiograph.   ACCESSION NUMBER(S): [id number]   ORDERING CLINICIAN: RATNA MANZANO   FINDINGS: AP radiograph of the chest was provided.   The patient is rotated. Overlapping radiopaque leads.   CARDIOMEDIASTINAL SILHOUETTE: Similar cardiomediastinal silhouette.   LUNGS: Similar expansion and aeration allowing for difference in patient position without evident consolidative pneumonia, edema, or effusion.   ABDOMEN: No remarkable upper abdominal findings.   BONES: No acute osseous changes.       1.  No evidence of acute cardiopulmonary process.       MACRO: None   Signed by: Dionte Ramos 9/22/2024 3:10 PM Dictation workstation:   [id number]    CT head wo IV contrast    Result Date: 9/22/2024  Interpreted By:  Tom Norwood, STUDY: CT HEAD WO IV CONTRAST;  9/22/2024 10:14 am   INDICATION: Signs/Symptoms:confusion, hx of UTIs.   COMPARISON: CT head 09/05/2023.   ACCESSION NUMBER(S): [id number]   ORDERING CLINICIAN: RATNA MANZANO   TECHNIQUE: Noncontrast axial CT scan of head was performed.   FINDINGS: Parenchyma: No intracranial hemorrhage. The grey-white differentiation is intact. No mass effect or midline shift. Patchy periventricular white matter hypodensities, likely chronic microvascular ischemic change. Moderate parenchymal atrophy.   CSF Spaces: The ventricles, sulci and basal cisterns are proportional to degree of parenchymal volume loss.   Extra-Axial Fluid: None.   Calvarium: No acute depressed fracture.   Paranasal sinuses: Near complete opacification of bilateral sphenoid sinuses with air bubbles. Bilateral maxillary mucosal thickening with air-fluid levels. Ethmoid mucosal thickening with partial air cell opacification.   Mastoids: Clear.   Orbits: No acute abnormality.   Soft tissues: No acute abnormality.       No CT  evidence of acute intracranial abnormality.   Severe paranasal sinusitis.   MACRO None   Signed by: Tom Norwood 9/22/2024 11:06 AM Dictation workstation:   [id number]    Results for orders placed or performed during the hospital encounter of 09/22/24 (from the past 24 hour(s))   CBC and Auto Differential   Result Value Ref Range    WBC 8.9 4.4 - 11.3 x10*3/uL    nRBC 0.0 0.0 - 0.0 /100 WBCs    RBC 3.67 (L) 4.00 - 5.20 x10*6/uL    Hemoglobin 11.2 (L) 12.0 - 16.0 g/dL    Hematocrit 35.2 (L) 36.0 - 46.0 %    MCV 96 80 - 100 fL    MCH 30.5 26.0 - 34.0 pg    MCHC 31.8 (L) 32.0 - 36.0 g/dL    RDW 13.7 11.5 - 14.5 %    Platelets 164 150 - 450 x10*3/uL    Neutrophils % 76.0 40.0 - 80.0 %    Immature Granulocytes %, Automated 0.4 0.0 - 0.9 %    Lymphocytes % 13.1 13.0 - 44.0 %    Monocytes % 7.7 2.0 - 10.0 %    Eosinophils % 2.4 0.0 - 6.0 %    Basophils % 0.4 0.0 - 2.0 %    Neutrophils Absolute 6.78 (H) 1.60 - 5.50 x10*3/uL    Immature Granulocytes Absolute, Automated 0.04 0.00 - 0.50 x10*3/uL    Lymphocytes Absolute 1.17 0.80 - 3.00 x10*3/uL    Monocytes Absolute 0.69 0.05 - 0.80 x10*3/uL    Eosinophils Absolute 0.21 0.00 - 0.40 x10*3/uL    Basophils Absolute 0.04 0.00 - 0.10 x10*3/uL   Comprehensive metabolic panel   Result Value Ref Range    Glucose 117 (H) 74 - 99 mg/dL    Sodium 140 136 - 145 mmol/L    Potassium 3.9 3.5 - 5.3 mmol/L    Chloride 105 98 - 107 mmol/L    Bicarbonate 24 21 - 32 mmol/L    Anion Gap 15 10 - 20 mmol/L    Urea Nitrogen 21 6 - 23 mg/dL    Creatinine 1.26 (H) 0.50 - 1.05 mg/dL    eGFR 43 (L) >60 mL/min/1.73m*2    Calcium 9.5 8.6 - 10.3 mg/dL    Albumin 4.1 3.4 - 5.0 g/dL    Alkaline Phosphatase 69 33 - 136 U/L    Total Protein 7.3 6.4 - 8.2 g/dL    AST 14 9 - 39 U/L    Bilirubin, Total 0.9 0.0 - 1.2 mg/dL    ALT 9 7 - 45 U/L   Lipase   Result Value Ref Range    Lipase 6 (L) 9 - 82 U/L   Urinalysis with Reflex Culture and Microscopic   Result Value Ref Range    Color, Urine Light-Yellow  Light-Yellow, Yellow, Dark-Yellow    Appearance, Urine Clear Clear    Specific Gravity, Urine 1.015 1.005 - 1.035    pH, Urine 7.0 5.0, 5.5, 6.0, 6.5, 7.0, 7.5, 8.0    Protein, Urine NEGATIVE NEGATIVE, 10 (TRACE), 20 (TRACE) mg/dL    Glucose, Urine Normal Normal mg/dL    Blood, Urine NEGATIVE NEGATIVE    Ketones, Urine NEGATIVE NEGATIVE mg/dL    Bilirubin, Urine NEGATIVE NEGATIVE    Urobilinogen, Urine 3 (1+) (A) Normal mg/dL    Nitrite, Urine NEGATIVE NEGATIVE    Leukocyte Esterase, Urine 75 Sonja/µL (A) NEGATIVE   Microscopic Only, Urine   Result Value Ref Range    WBC, Urine 1-5 1-5, NONE /HPF    RBC, Urine NONE NONE, 1-2, 3-5 /HPF    Squamous Epithelial Cells, Urine 1-9 (SPARSE) Reference range not established. /HPF    Bacteria, Urine 1+ (A) NONE SEEN /HPF           Assessment/Plan   Assessment & Plan  Delirium      Delirium.  Patient comes with acute toxic encephalopathy on top of underlying dementia due to UTI plus minus early pneumonia on top of underlying dementia plus dehydration.     2.    UTI.  Follow culture.  Antibiotics.    3.     Hydration.  Clinically, patient seems to be on the dry side.  IV fluids.    4.     Patient has moist cough.  X-ray is unremarkable but patient looks dehydrated, maybe we do not see an early infiltrate.  Will treat with empiric antibiotics for community-acquired pneumonia due to gram-positive microorganisms.  I suspect patient might have some aspiration component.  According to her  sometimes she coughs when she eats.  Will consult speech therapy.  Use expectorants    5.     Type 2 diabetes.  Will use insulin sliding scale will lower doses of insulin.  Check hemoglobin A1c    6.      History of depression and anxiety.  Continue medications    7.      Consult physical, occupational therapy, social service.    8.      DVT prophylaxis.  Heparin subcu    8.      CODE STATUS discussed with patient's .  He is agreeable for short trial but definitely does not want any  prolonged life support and prolonged CPR.  Nazanin Carranza MD

## 2024-09-22 NOTE — ED TRIAGE NOTES
Pts  called ems due to change in pts mental status. Pt is currently aox2 and is usually aox3. Pt has a hx of UTIs with agitation. Pt states she's been frequently urinating and burning when urinating. Pt states she's also having lower back pain and abd pain as well. Pt denies sob, denies chest pain, denies headache, denies n/v/d at this time,

## 2024-09-22 NOTE — ED PROVIDER NOTES
History of Present Illness     History provided by: Family Member  Limitations to History: Dementia  External Records Reviewed with Brief Summary:  Prior ED notes    HPI:  Adriana Duran is a 79 y.o. female presents with altered mental status.  Patient has a history of dementia after past 2 days has been more altered than her baseline.  Family notes patient is been more aggressive and not been sleeping through the night.  Prior history of UTIs, often becomes this way when she is dealing with a UTI.    Physical Exam   Triage vitals:  T 36.9 °C (98.4 °F)  HR 81  /59  RR 18  O2 95 % None (Room air)    Physical Exam  Vitals and nursing note reviewed.   Constitutional:       General: She is not in acute distress.     Appearance: She is not ill-appearing.   HENT:      Head: Normocephalic and atraumatic.      Mouth/Throat:      Mouth: Mucous membranes are moist.      Pharynx: Oropharynx is clear.   Eyes:      Extraocular Movements: Extraocular movements intact.      Conjunctiva/sclera: Conjunctivae normal.      Pupils: Pupils are equal, round, and reactive to light.   Cardiovascular:      Rate and Rhythm: Normal rate and regular rhythm.   Pulmonary:      Effort: Pulmonary effort is normal. No respiratory distress.      Breath sounds: Normal breath sounds.   Abdominal:      General: There is no distension.      Palpations: Abdomen is soft.      Tenderness: There is no abdominal tenderness.   Musculoskeletal:         General: No swelling or deformity. Normal range of motion.      Cervical back: Normal range of motion and neck supple.   Skin:     General: Skin is warm and dry.      Capillary Refill: Capillary refill takes less than 2 seconds.   Neurological:      General: No focal deficit present.      Mental Status: She is alert. She is confused.   Psychiatric:         Mood and Affect: Mood normal.         Behavior: Behavior normal.          Medical Decision Making & ED Course   Medical Decision Makin  y.o. female presents with AMS.  I have considered the following conditions in my assessment of this patient's altered mental status: Hypo/Hyperglycemia, CVA, MI, hepatic encephalopathy, encephalitis, HHNC, hypernatremia, hyponatremia, hypo/hyperthyroidism, seizure, Encephalopathy, Infection, Psychosis, overdose, alcohol intoxication, trauma, delirium.  Patient with no history of dementia, often with worsening confusion in the setting of infection.  Mild urinary tract infection seen on urinalysis.  Patient also found to seen pansinusitis on CT head.  Remainder of labs otherwise reassuring and at patient baseline.  Patient family is unable to care for her at home given the amount of aggression and confusion that she has been exhibiting.  At this time we will start patient on oral antibiotics and admit patient to hospitalist for further management.  ----  Social Determinants of Health which Significantly Impact Care: None identified     EKG Independent Interpretation: EKG not obtained    Independent Result Review and Interpretation: Relevant laboratory and radiographic results were reviewed and independently interpreted by myself.  As necessary, they are commented on in the ED Course.    Chronic conditions affecting the patient's care: As documented above in MDM    The patient was discussed with the following consultants/services: Hospitalist/Admitting Provider who accepted the patient for admission    Care Considerations: As documented above in [facility]    ED Course:  ED Course as of 09/22/24 1447   Sun Sep 22, 2024   0956 Creatinine(!): 1.26  baseline [JM]      ED Course User Index  [JM] Nicol Sheridan MD         Diagnoses as of 09/22/24 1447   Delirium   Acute non-recurrent pansinusitis       Procedures   Procedures    Nicol Sheridan MD  Emergency Medicine     Nicol Sheridan MD  09/26/24 1248

## 2024-09-22 NOTE — PROGRESS NOTES
Adriana Duran is a 79 y.o. female on day 0 of admission presenting with Delirium.       09/22/24 1547   ACS Disability Status   Are you deaf or do you have serious difficulty hearing? N   Are you blind or do you have serious difficulty seeing, even when wearing glasses? Y   Because of a physical, mental, or emotional condition, do you have serious difficulty concentrating, remembering, or making decisions? (5 years old or older) N   Do you have serious difficulty walking or climbing stairs? Y   Do you have serious difficulty dressing or bathing? N   Because of a physical, mental, or emotional condition, do you have serious difficulty doing errands alone such as visiting the doctor? N         Janet Cruz RN

## 2024-09-22 NOTE — PROGRESS NOTES
"Adriana Duran is a 79 y.o. female on day 0 of admission presenting with Delirium.       09/22/24 4450   Current Planned Discharge Disposition   Current Planned Discharge Disposition Home H     Patient lives with her  in a single level house. She uses a walker to ambulate. Her  states she has \"bad balance and a bad back\". She also has a wheelchair if needed. She is independent with bathing and getting dressed.  voices safety concerns and states he is considering HHA to assist with patient getting in and out of tub. He states patient can not stand for more than 10-15 minutes d/t back pain. He does the cooking, cleaning and driving. PCP is Tawanda Joel PA-C.  ADOD 09/25/2024   states patient has an appointment with PCP Wed. He had planned to ask about hha/hhc. RNCC explained the difference, offered to set up HHC. He asked RNCC to hold off until after the appointment.   Family will transport patient home.    Janet Cruz RN      "

## 2024-09-23 PROBLEM — F02.818: Status: ACTIVE | Noted: 2024-09-23

## 2024-09-23 PROBLEM — J01.10 SINUSITIS, ACUTE FRONTAL: Status: ACTIVE | Noted: 2024-09-23

## 2024-09-23 PROBLEM — F03.90 DEMENTIA: Status: ACTIVE | Noted: 2024-09-23

## 2024-09-23 PROBLEM — R26.9 GAIT DISORDER: Status: ACTIVE | Noted: 2024-09-23

## 2024-09-23 PROBLEM — G30.9: Status: ACTIVE | Noted: 2024-09-23

## 2024-09-23 LAB
EST. AVERAGE GLUCOSE BLD GHB EST-MCNC: 105 MG/DL
GLUCOSE BLD MANUAL STRIP-MCNC: 114 MG/DL (ref 74–99)
GLUCOSE BLD MANUAL STRIP-MCNC: 114 MG/DL (ref 74–99)
GLUCOSE BLD MANUAL STRIP-MCNC: 116 MG/DL (ref 74–99)
GLUCOSE BLD MANUAL STRIP-MCNC: 72 MG/DL (ref 74–99)
GLUCOSE BLD MANUAL STRIP-MCNC: 73 MG/DL (ref 74–99)
GLUCOSE BLD MANUAL STRIP-MCNC: 78 MG/DL (ref 74–99)
GLUCOSE BLD MANUAL STRIP-MCNC: 79 MG/DL (ref 74–99)
GLUCOSE BLD MANUAL STRIP-MCNC: 90 MG/DL (ref 74–99)
GLUCOSE BLD MANUAL STRIP-MCNC: 98 MG/DL (ref 74–99)
HBA1C MFR BLD: 5.3 %
SARS-COV-2 RNA RESP QL NAA+PROBE: NOT DETECTED

## 2024-09-23 PROCEDURE — 1100000001 HC PRIVATE ROOM DAILY

## 2024-09-23 PROCEDURE — 2500000002 HC RX 250 W HCPCS SELF ADMINISTERED DRUGS (ALT 637 FOR MEDICARE OP, ALT 636 FOR OP/ED): Performed by: INTERNAL MEDICINE

## 2024-09-23 PROCEDURE — 97162 PT EVAL MOD COMPLEX 30 MIN: CPT | Mod: GP

## 2024-09-23 PROCEDURE — 87635 SARS-COV-2 COVID-19 AMP PRB: CPT | Performed by: REGISTERED NURSE

## 2024-09-23 PROCEDURE — 2500000001 HC RX 250 WO HCPCS SELF ADMINISTERED DRUGS (ALT 637 FOR MEDICARE OP): Performed by: INTERNAL MEDICINE

## 2024-09-23 PROCEDURE — 2500000004 HC RX 250 GENERAL PHARMACY W/ HCPCS (ALT 636 FOR OP/ED): Performed by: INTERNAL MEDICINE

## 2024-09-23 PROCEDURE — 82947 ASSAY GLUCOSE BLOOD QUANT: CPT

## 2024-09-23 PROCEDURE — 97166 OT EVAL MOD COMPLEX 45 MIN: CPT | Mod: GO

## 2024-09-23 PROCEDURE — 92610 EVALUATE SWALLOWING FUNCTION: CPT | Mod: GN | Performed by: PHARMACIST

## 2024-09-23 RX ORDER — FLUTICASONE PROPIONATE 50 MCG
2 SPRAY, SUSPENSION (ML) NASAL DAILY
Status: DISCONTINUED | OUTPATIENT
Start: 2024-09-23 | End: 2024-09-25 | Stop reason: HOSPADM

## 2024-09-23 RX ADMIN — BUSPIRONE HYDROCHLORIDE 10 MG: 10 TABLET ORAL at 09:26

## 2024-09-23 RX ADMIN — LOSARTAN POTASSIUM 100 MG: 100 TABLET, FILM COATED ORAL at 09:26

## 2024-09-23 RX ADMIN — AZITHROMYCIN DIHYDRATE 500 MG: 500 TABLET ORAL at 09:26

## 2024-09-23 RX ADMIN — SODIUM BICARBONATE 650 MG TABLET 650 MG: at 14:35

## 2024-09-23 RX ADMIN — ASPIRIN 81 MG: 81 TABLET, COATED ORAL at 09:26

## 2024-09-23 RX ADMIN — BUSPIRONE HYDROCHLORIDE 10 MG: 10 TABLET ORAL at 14:35

## 2024-09-23 RX ADMIN — SODIUM BICARBONATE 650 MG TABLET 650 MG: at 20:37

## 2024-09-23 RX ADMIN — SODIUM BICARBONATE 650 MG TABLET 650 MG: at 09:26

## 2024-09-23 RX ADMIN — GUAIFENESIN 200 MG: 200 SOLUTION ORAL at 12:44

## 2024-09-23 RX ADMIN — OLANZAPINE 2.5 MG: 5 TABLET, FILM COATED ORAL at 20:37

## 2024-09-23 RX ADMIN — AMPICILLIN SODIUM AND SULBACTAM SODIUM 3 G: 2; 1 INJECTION, POWDER, FOR SOLUTION INTRAMUSCULAR; INTRAVENOUS at 14:46

## 2024-09-23 RX ADMIN — BUSPIRONE HYDROCHLORIDE 10 MG: 10 TABLET ORAL at 20:37

## 2024-09-23 RX ADMIN — VITAM B12 100 MCG: 100 TAB at 09:44

## 2024-09-23 RX ADMIN — DULOXETINE HYDROCHLORIDE 60 MG: 60 CAPSULE, DELAYED RELEASE ORAL at 09:26

## 2024-09-23 RX ADMIN — AMPICILLIN SODIUM AND SULBACTAM SODIUM 3 G: 2; 1 INJECTION, POWDER, FOR SOLUTION INTRAMUSCULAR; INTRAVENOUS at 19:51

## 2024-09-23 RX ADMIN — HEPARIN SODIUM 5000 UNITS: 5000 INJECTION, SOLUTION INTRAVENOUS; SUBCUTANEOUS at 05:42

## 2024-09-23 RX ADMIN — FLUTICASONE PROPIONATE 2 SPRAY: 50 SPRAY, METERED NASAL at 14:35

## 2024-09-23 RX ADMIN — CITALOPRAM 40 MG: 40 TABLET, FILM COATED ORAL at 09:26

## 2024-09-23 RX ADMIN — ATORVASTATIN CALCIUM 20 MG: 20 TABLET, FILM COATED ORAL at 20:37

## 2024-09-23 RX ADMIN — HEPARIN SODIUM 5000 UNITS: 5000 INJECTION, SOLUTION INTRAVENOUS; SUBCUTANEOUS at 17:19

## 2024-09-23 SDOH — SOCIAL STABILITY: SOCIAL INSECURITY: ARE YOU OR HAVE YOU BEEN THREATENED OR ABUSED PHYSICALLY, EMOTIONALLY, OR SEXUALLY BY ANYONE?: NO

## 2024-09-23 SDOH — SOCIAL STABILITY: SOCIAL INSECURITY: DO YOU FEEL UNSAFE GOING BACK TO THE PLACE WHERE YOU ARE LIVING?: NO

## 2024-09-23 SDOH — SOCIAL STABILITY: SOCIAL INSECURITY: DOES ANYONE TRY TO KEEP YOU FROM HAVING/CONTACTING OTHER FRIENDS OR DOING THINGS OUTSIDE YOUR HOME?: NO

## 2024-09-23 SDOH — HEALTH STABILITY: MENTAL HEALTH: EXPERIENCED ANY OF THE FOLLOWING LIFE EVENTS: OTHER (COMMENT)

## 2024-09-23 SDOH — SOCIAL STABILITY: SOCIAL INSECURITY: POSSIBLE ABUSE REPORTED TO:: OTHER (COMMENT)

## 2024-09-23 SDOH — SOCIAL STABILITY: SOCIAL INSECURITY: HAVE YOU HAD ANY THOUGHTS OF HARMING ANYONE ELSE?: NO

## 2024-09-23 SDOH — SOCIAL STABILITY: SOCIAL INSECURITY: HAVE YOU HAD THOUGHTS OF HARMING ANYONE ELSE?: YES

## 2024-09-23 SDOH — SOCIAL STABILITY: SOCIAL INSECURITY: WERE YOU ABLE TO COMPLETE ALL THE BEHAVIORAL HEALTH SCREENINGS?: YES

## 2024-09-23 SDOH — SOCIAL STABILITY: SOCIAL INSECURITY: HAS ANYONE EVER THREATENED TO HURT YOUR FAMILY OR YOUR PETS?: NO

## 2024-09-23 SDOH — SOCIAL STABILITY: SOCIAL INSECURITY: ABUSE: ADULT

## 2024-09-23 SDOH — SOCIAL STABILITY: SOCIAL INSECURITY: DO YOU FEEL ANYONE HAS EXPLOITED OR TAKEN ADVANTAGE OF YOU FINANCIALLY OR OF YOUR PERSONAL PROPERTY?: NO

## 2024-09-23 SDOH — SOCIAL STABILITY: SOCIAL INSECURITY: ARE THERE ANY APPARENT SIGNS OF INJURIES/BEHAVIORS THAT COULD BE RELATED TO ABUSE/NEGLECT?: NO

## 2024-09-23 ASSESSMENT — COGNITIVE AND FUNCTIONAL STATUS - GENERAL
PERSONAL GROOMING: A LITTLE
DRESSING REGULAR UPPER BODY CLOTHING: A LITTLE
CLIMB 3 TO 5 STEPS WITH RAILING: TOTAL
WALKING IN HOSPITAL ROOM: A LOT
TOILETING: A LITTLE
MOVING TO AND FROM BED TO CHAIR: A LOT
STANDING UP FROM CHAIR USING ARMS: A LOT
MOVING TO AND FROM BED TO CHAIR: A LITTLE
CLIMB 3 TO 5 STEPS WITH RAILING: TOTAL
DAILY ACTIVITIY SCORE: 18
DRESSING REGULAR LOWER BODY CLOTHING: A LITTLE
PATIENT BASELINE BEDBOUND: NO
HELP NEEDED FOR BATHING: A LITTLE
PERSONAL GROOMING: A LITTLE
PERSONAL GROOMING: A LITTLE
TURNING FROM BACK TO SIDE WHILE IN FLAT BAD: A LITTLE
MOVING TO AND FROM BED TO CHAIR: A LITTLE
DRESSING REGULAR UPPER BODY CLOTHING: A LITTLE
TOILETING: A LITTLE
EATING MEALS: A LITTLE
DRESSING REGULAR UPPER BODY CLOTHING: A LITTLE
DAILY ACTIVITIY SCORE: 18
TURNING FROM BACK TO SIDE WHILE IN FLAT BAD: A LITTLE
HELP NEEDED FOR BATHING: A LITTLE
DRESSING REGULAR UPPER BODY CLOTHING: A LOT
HELP NEEDED FOR BATHING: A LITTLE
EATING MEALS: A LITTLE
STANDING UP FROM CHAIR USING ARMS: A LITTLE
WALKING IN HOSPITAL ROOM: A LOT
STANDING UP FROM CHAIR USING ARMS: A LOT
TOILETING: A LITTLE
TURNING FROM BACK TO SIDE WHILE IN FLAT BAD: A LITTLE
MOVING FROM LYING ON BACK TO SITTING ON SIDE OF FLAT BED WITH BEDRAILS: A LITTLE
MOVING FROM LYING ON BACK TO SITTING ON SIDE OF FLAT BED WITH BEDRAILS: A LOT
MOBILITY SCORE: 13
HELP NEEDED FOR BATHING: A LOT
MOVING TO AND FROM BED TO CHAIR: A LITTLE
MOBILITY SCORE: 14
CLIMB 3 TO 5 STEPS WITH RAILING: A LOT
MOBILITY SCORE: 14
PERSONAL GROOMING: A LITTLE
TOILETING: A LOT
DRESSING REGULAR LOWER BODY CLOTHING: A LITTLE
DRESSING REGULAR LOWER BODY CLOTHING: A LITTLE
DRESSING REGULAR LOWER BODY CLOTHING: TOTAL
STANDING UP FROM CHAIR USING ARMS: A LOT
WALKING IN HOSPITAL ROOM: A LOT
TURNING FROM BACK TO SIDE WHILE IN FLAT BAD: A LOT
DAILY ACTIVITIY SCORE: 18
DAILY ACTIVITIY SCORE: 14
EATING MEALS: A LITTLE
MOVING FROM LYING ON BACK TO SITTING ON SIDE OF FLAT BED WITH BEDRAILS: A LITTLE
CLIMB 3 TO 5 STEPS WITH RAILING: TOTAL
MOBILITY SCORE: 14
MOVING FROM LYING ON BACK TO SITTING ON SIDE OF FLAT BED WITH BEDRAILS: A LITTLE
WALKING IN HOSPITAL ROOM: A LOT

## 2024-09-23 ASSESSMENT — ACTIVITIES OF DAILY LIVING (ADL)
ASSISTIVE_DEVICE: WALKER
HEARING - RIGHT EAR: FUNCTIONAL
BATHING_ASSISTANCE: MAXIMAL
ADL_ASSISTANCE: INDEPENDENT
FEEDING YOURSELF: NEEDS ASSISTANCE
JUDGMENT_ADEQUATE_SAFELY_COMPLETE_DAILY_ACTIVITIES: YES
HEARING - LEFT EAR: FUNCTIONAL
GROOMING: NEEDS ASSISTANCE
ADEQUATE_TO_COMPLETE_ADL: YES
ADLS_ADDRESSED: NO
BATHING: NEEDS ASSISTANCE
DRESSING YOURSELF: NEEDS ASSISTANCE
PATIENT'S MEMORY ADEQUATE TO SAFELY COMPLETE DAILY ACTIVITIES?: YES
TOILETING: NEEDS ASSISTANCE
ADL_ASSISTANCE: INDEPENDENT
WALKS IN HOME: NEEDS ASSISTANCE

## 2024-09-23 ASSESSMENT — LIFESTYLE VARIABLES
SKIP TO QUESTIONS 9-10: 1
PRESCIPTION_ABUSE_PAST_12_MONTHS: NO
HOW MANY STANDARD DRINKS CONTAINING ALCOHOL DO YOU HAVE ON A TYPICAL DAY: PATIENT DOES NOT DRINK
AUDIT-C TOTAL SCORE: 0
HOW OFTEN DO YOU HAVE A DRINK CONTAINING ALCOHOL: NEVER
AUDIT-C TOTAL SCORE: 0
SUBSTANCE_ABUSE_PAST_12_MONTHS: NO
HOW OFTEN DO YOU HAVE 6 OR MORE DRINKS ON ONE OCCASION: NEVER

## 2024-09-23 ASSESSMENT — PATIENT HEALTH QUESTIONNAIRE - PHQ9
1. LITTLE INTEREST OR PLEASURE IN DOING THINGS: NOT AT ALL
2. FEELING DOWN, DEPRESSED OR HOPELESS: NOT AT ALL
SUM OF ALL RESPONSES TO PHQ9 QUESTIONS 1 & 2: 0

## 2024-09-23 ASSESSMENT — PAIN - FUNCTIONAL ASSESSMENT
PAIN_FUNCTIONAL_ASSESSMENT: 0-10

## 2024-09-23 ASSESSMENT — PAIN SCALES - GENERAL
PAINLEVEL_OUTOF10: 3
PAINLEVEL_OUTOF10: 3
PAINLEVEL_OUTOF10: 0 - NO PAIN

## 2024-09-23 NOTE — PROGRESS NOTES
Occupational Therapy    Evaluation    Patient Name: Adriana Duran  MRN: [id number]  Department: [address]  Room: [address] [place] [address]  Today's Date: 9/23/2024  Time Calculation  Start Time: 1152  Stop Time: 1208  Time Calculation (min): 16 min        Assessment:  Prognosis: Good  Barriers to Discharge: Other (Comment) (cognition)  Evaluation/Treatment Tolerance: Patient limited by pain  Medical Staff Made Aware: Yes  End of Session Communication: Bedside nurse  End of Session Patient Position: Alarm on, Bed, 3 rail up  OT Assessment Results: Decreased ADL status, Decreased safe judgment during ADL, Decreased endurance, Decreased fine motor control, Decreased functional mobility, Decreased gross motor control, Decreased upper extremity strength  Prognosis: Good  Barriers to Discharge: Other (Comment) (cognition)  Evaluation/Treatment Tolerance: Patient limited by pain  Medical Staff Made Aware: Yes  Strengths: Attitude of self, Support of Caregivers, Rehab experience  Barriers to Participation: Comorbidities, Ability to acquire knowledge  Plan:  Treatment Interventions: ADL retraining, Functional transfer training, UE strengthening/ROM, Endurance training, Cognitive reorientation, Patient/family training, Compensatory technique education  OT Frequency: 4 times per week  OT Discharge Recommendations: Moderate intensity level of continued care  OT Recommended Transfer Status: Assist of 1  OT - OK to Discharge: Yes  Treatment Interventions: ADL retraining, Functional transfer training, UE strengthening/ROM, Endurance training, Cognitive reorientation, Patient/family training, Compensatory technique education    Subjective   Current Problem:  1. Delirium        2. Acute non-recurrent pansinusitis          General:  General  Reason for Referral: OT Eval and treat  Referred By: Nazanin Carranza  Past Medical History Relevant to Rehab: Pt admitted with delirium, dementia, sinusitis. (PMH; advanced dementia, Arthritis, Chronic  kidney disease, Diabetes mellitus (Multi), Foot pain, left, Hypertension, Right knee pain, and UTI, Hip fracture surgery (L); Knee surgery (L); Knee surgery (R); Shoulder surgery (L); TSR;)  Missed Visit: No  Family/Caregiver Present: No  Co-Treatment: PT  Co-Treatment Reason:  (optimize Pt safety and outcomes)  Prior to Session Communication: Bedside nurse  Patient Position Received: Bed, 2 rail up, Alarm on  Preferred Learning Style: verbal  General Comment:  (Pt is an 79 year old female with increasing need for assist at home with spouse reporting concerns for safety.)  Precautions:  Medical Precautions: Fall precautions    Pain:  Pain Assessment  Pain Assessment: 0-10  0-10 (Numeric) Pain Score: 3  Pain Location:  (generalized arthritis pain)  Pain Frequency: Intermittent    Objective   Cognition:  Orientation Level: Disoriented to time, Disoriented to place, Disoriented to situation  Problem Solving:  (impaired)  Safety/Judgement:  (mod cues for hand placement, proper walker use)  Insight:  (decreased insight into deficits)        [place] Agitation Sedation Scale  [place] Agitation Sedation Scale (RASS): Alert and calm  Home Living:  Type of Home: House  Lives With: Spouse  Home Adaptive Equipment: Walker rolling or standard, Wheelchair-manual  Home Layout: One level  Home Access: Other (Comment) (4 RUBÉN per previous intake (questionable historian))  Bathroom Shower/Tub: Tub/shower unit  Bathroom Equipment: Other (Comment) (unknown, questionable historian)  Home Living Comments:  (with spouse, other family assist prn per medical record)  Prior Function:  Level of [place]: Independent with ADLs and functional transfers, Needs assistance with homemaking  Receives Help From: Family  ADL Assistance: Independent  Homemaking Assistance: Needs assistance    ADL:  Eating Assistance: Independent  Grooming Assistance: Minimal  Bathing Assistance: Maximal  UE Dressing Assistance: Moderate  LE Dressing Assistance:  Total  Toileting Assistance with Device: Total  Activity Tolerance:  Endurance: Decreased tolerance for upright activites  Bed Mobility/Transfers: Bed Mobility  Bed Mobility:  (Mod A for sup to sit assist for LEs)    Transfers  Transfer:  (Min A x 2 with FWW for Sit <->stand)    Functional Mobility:  Functional Mobility  Functional Mobility Performed:  (Min A x 2 for side steps with FWW)  Sitting Balance:  Static Sitting Balance  Static Sitting-Level of Assistance: Contact guard  Dynamic Sitting Balance  Dynamic Sitting-Level of Assistance: Minimum assistance  Standing Balance:  Static Standing Balance  Static Standing-Level of Assistance: Minimum assistance  Dynamic Standing Balance  Dynamic Standing-Level of Assistance: Moderate assistance     Vision:Vision - Basic Assessment  Current Vision: Wears glasses all the time  Sensation:  Light Touch: No apparent deficits  Strength:  Strength Comments:  (B UE ~3+/5 throughout)    Coordination:  Movements are Fluid and Coordinated: No  Coordination Comment:  (increased time/effort)   Hand Function:  Gross Grasp: Impaired  Coordination: Impaired    Outcome Measures:[facility] Daily Activity  Putting on and taking off regular lower body clothing: Total  Bathing (including washing, rinsing, drying): A lot  Putting on and taking off regular upper body clothing: A lot  Toileting, which includes using toilet, bedpan or urinal: A lot  Taking care of personal grooming such as brushing teeth: A little  Eating Meals: None  Daily Activity - Total Score: 14    Education Documentation  ADL Training, taught by Emma Petty OT at 9/23/2024  1:13 PM.  Learner: Patient  Readiness: Acceptance  Method: Explanation  Response: Verbalizes Understanding    Education Comments  No comments found.    OP EDUCATION:     Goals:  Encounter Problems       Encounter Problems (Active)       ADL       Goal 1 (Progressing)       Start:  09/23/24    Expected End:  10/04/24       Patient will demonstrate  improved ADL skills:  Bathing with Min assist with adaptive equipment/DME prn      Grooming with supervision assist    UE Dressing with supervision assist         LE Dressing with Min assist with adaptive equipment/DME prn       Toileting with Min assist with adaptive equipment/DME prn

## 2024-09-23 NOTE — ASSESSMENT & PLAN NOTE
She is demented at baseline and I know how far off she is from her baseline actually.  She does seem to have acute sinusitis and ethmoid am going to treat.  I am ordering Unasyn and discontinuing ceftriaxone and Zithromax

## 2024-09-23 NOTE — CARE PLAN
The patient's goals for the shift include no fall or injury by the end of shift    The clinical goals for the shift include no fall or injury by the end of shift; pt able to be reoriented by staff

## 2024-09-23 NOTE — PROGRESS NOTES
Patient not medically clear. [facility] spoke to patient's spouse, Yung, regarding discharge plan. Yung is going to speak to his daughter and then let me know their decision. At this time there is not a safe discharge plan in place. Will continue to follow.      09/23/24 1410   Discharge Planning   Home or Post Acute Services Post acute facilities (Rehab/SNF/etc)   Type of Post Acute Facility Services Rehab   Expected Discharge Disposition SNF   Does the patient need discharge transport arranged? Yes   RoundTrip coordination needed? Yes

## 2024-09-23 NOTE — PROGRESS NOTES
Adriana Duran is a 79 y.o. female on day 1 of admission presenting with Delirium.      Subjective    Demented patient with an acute decline in function and a cough that kept her up all night.  She got weak and had trouble walking and she was perhaps more confused than usual.  She had a lot of tests done yesterday and the main findings were with the radiologist referred to his severe pansinusitis.  Chest x-ray was clear.  Her urine had 1-5 white cells in it.    Objective she is alert she is speaking in sentences clearly but she is also disorganized and does not really seem to know why she is here pleasant cooperative  Extraocular motion intact  Heart regular rate and rhythm without murmurs rubs or gallops  Lungs clear to auscultation normal to percussion  Abdomen soft nontender nondistended  Extremities 1+ pitting bilateral leg edema  Neuro cranial nerves intact good tone strength in arms and legs.    Last Recorded Vitals  /60   Pulse 78   Temp 37 °C (98.6 °F) (Oral)   Resp 18   Wt 78.5 kg (173 lb)   SpO2 94%   Intake/Output last 3 Shifts:    Intake/Output Summary (Last 24 hours) at 9/23/2024 1219  Last data filed at 9/23/2024 0500  Gross per 24 hour   Intake --   Output 100 ml   Net -100 ml       Admission Weight  Weight: 78.5 kg (173 lb) (09/22/24 0902)    Daily Weight  09/22/24 : 78.5 kg (173 lb)    Image Results  XR chest 1 view  Narrative: Interpreted By:  Dionte Ramos,   STUDY:  XR CHEST 1 VIEW;  9/22/2024 2:33 pm      INDICATION:  Signs/Symptoms:cough, sinus infection.          COMPARISON:  April 19, 2018 chest CT, December 28, 2023 chest radiograph.      ACCESSION NUMBER(S):  [id number]      ORDERING CLINICIAN:  RATNA MANZANO      FINDINGS:  AP radiograph of the chest was provided.      The patient is rotated. Overlapping radiopaque leads.      CARDIOMEDIASTINAL SILHOUETTE:  Similar cardiomediastinal silhouette.      LUNGS:  Similar expansion and aeration allowing for difference  in patient  position without evident consolidative pneumonia, edema, or effusion.      ABDOMEN:  No remarkable upper abdominal findings.      BONES:  No acute osseous changes.      Impression: 1.  No evidence of acute cardiopulmonary process.              MACRO:  None      Signed by: Dionte Ramos 9/22/2024 3:10 PM  Dictation workstation:   [id number]  CT head wo IV contrast  Narrative: Interpreted By:  Tom Norwood,   STUDY:  CT HEAD WO IV CONTRAST;  9/22/2024 10:14 am      INDICATION:  Signs/Symptoms:confusion, hx of UTIs.      COMPARISON:  CT head 09/05/2023.      ACCESSION NUMBER(S):  [id number]      ORDERING CLINICIAN:  RATNA MANZANO      TECHNIQUE:  Noncontrast axial CT scan of head was performed.      FINDINGS:  Parenchyma: No intracranial hemorrhage. The grey-white  differentiation is intact. No mass effect or midline shift. Patchy  periventricular white matter hypodensities, likely chronic  microvascular ischemic change. Moderate parenchymal atrophy.      CSF Spaces: The ventricles, sulci and basal cisterns are proportional  to degree of parenchymal volume loss.      Extra-Axial Fluid: None.      Calvarium: No acute depressed fracture.      Paranasal sinuses: Near complete opacification of bilateral sphenoid  sinuses with air bubbles. Bilateral maxillary mucosal thickening with  air-fluid levels. Ethmoid mucosal thickening with partial air cell  opacification.      Mastoids: Clear.      Orbits: No acute abnormality.      Soft tissues: No acute abnormality.      Impression: No CT evidence of acute intracranial abnormality.      Severe paranasal sinusitis.      MACRO  None      Signed by: Tom Norwood 9/22/2024 11:06 AM  Dictation workstation:   [id number]      Physical Exam    Relevant Results               Assessment/Plan                  Assessment & Plan  Delirium  She is demented at baseline and I know how far off she is from her baseline actually.  She does seem to have acute  sinusitis and ethmoid am going to treat.  I am ordering Unasyn and discontinuing ceftriaxone and Zithromax  Sinusitis, acute frontal  In addition antibiotics some ordering Afrin for the next 3 to 3 days and Flonase.  Dementia  Will keep her on her regular meds which include 2 antidepressants and Zyprexa at bedtime  Gait disorder    Will treat all of the above.  She needs physical therapy.  I discussed things with her .  He did be interested in getting her into a rehab facility              Pj Pedro MD

## 2024-09-23 NOTE — NURSING NOTE
Pt was alert and oriented x1-2 today at times when  was at bedside alert and oriented x3. Pt forgetful and confused- admitted for delirium- stated that patient has been sick for sometime with an URI and a current UTI that she is being treated for via IV antibiotics.  stated that she struggles at times with remembering. PT compliant with medications- given with applesauce. Patients BS this morning was 72, then 73 after applesauce and apple juice. RN assisted patient in ordering food- patient at all meals today- after breakfast BS went up to 98, pt asymptomatic. Patient had a cough this afternoon- RN administered robitussin-+ effective. Patient has to be reminded to stay in bed- she can forget where she is and become irritable. Bed alarm on. Call light within reach. No complaints at this time.

## 2024-09-23 NOTE — CARE PLAN
The patient's goals for the shift include no fall or injury by the end of shift    The clinical goals for the shift include patient sleeps at least 4 hours by the end of shift       Problem: Safety - Adult  Goal: Free from fall injury  Outcome: Not Progressing     Problem: Fall/Injury  Goal: Not fall by end of shift  Outcome: Not Progressing     Problem: Skin  Goal: Decreased wound size/increased tissue granulation at next dressing change  Outcome: Not Progressing  Goal: Participates in plan/prevention/treatment measures  Outcome: Not Progressing  Goal: Prevent/manage excess moisture  Outcome: Not Progressing  Goal: Prevent/minimize sheer/friction injuries  9/23/2024 0234 by Jess Bowling, RN  Flowsheets (Taken 9/23/2024 0234)  Prevent/minimize sheer/friction injuries: Use pull sheet  9/23/2024 0230 by Jess Bowling, RN  Outcome: Not Progressing  Goal: Promote/optimize nutrition  Outcome: Not Progressing  Goal: Promote skin healing  Outcome: Not Progressing     Problem: Pain - Adult  Goal: Verbalizes/displays adequate comfort level or baseline comfort level  Outcome: Not Progressing     Problem: Safety - Adult  Goal: Free from fall injury  Outcome: Not Progressing     Problem: Discharge Planning  Goal: Discharge to home or other facility with appropriate resources  Outcome: Not Progressing     Problem: Chronic Conditions and Co-morbidities  Goal: Patient's chronic conditions and co-morbidity symptoms are monitored and maintained or improved  Outcome: Not Progressing     Problem: Fall/Injury  Goal: Not fall by end of shift  Outcome: Not Progressing  Goal: Be free from injury by end of the shift  Outcome: Not Progressing  Goal: Verbalize understanding of personal risk factors for fall in the hospital  Outcome: Not Progressing  Goal: Verbalize understanding of risk factor reduction measures to prevent injury from fall in the home  Outcome: Not Progressing  Goal: Use assistive devices by end of the shift  Outcome: Not  Progressing  Goal: Pace activities to prevent fatigue by end of the shift  Outcome: Not Progressing

## 2024-09-23 NOTE — PROGRESS NOTES
Speech-Language Pathology Clinical Swallow Evaluation    Patient Name: Adriana Duran  MRN: [id number]  : 1945  Today's Date: 24  Start Time: 1131  Stop Time: 1152  Time Calculation (min): 21 min      ASSESSMENT  Impressions:  Grossly WFL oral phase and no suspected pharyngeal phase dysphagia based on clinical swallow evaluation. No skilled ST needs identified; will DC. Please re-consult if concerns persist.    PLAN  Recommendations:  Is MBSS recommended? No; no pharyngeal dysphagia suspected.  Solid consistency: Regular (IDDSI level 7)  Liquid consistency: Thin (IDDSI 0)  Medication administration: Whole, in thin liquid, or as best tolerated  Compensatory swallow strategies:  - Upright positioning for all PO intake  - Slow rate of intake  - Small bites    Recommended frequency/duration:  Skilled SLP services recommended: No  Discharge recommendation: Home with no further SLP      SUBJECTIVE    PMHx relevant to rehab: Principal Problem:    Altered behavior in Alzheimer's disease (Multi)  Active Problems:    Delirium    Sinusitis, acute frontal    Dementia (Multi)    Gait disorder      Chief complaint: Pt was admitted on 24 due to   Chief Complaint   Patient presents with    Altered Mental Status    Urinary Frequency   And moist cough. She was found to have UTI and possible PNA.    Relevant imaging results:  CXR 24: IMPRESSION:  1.  No evidence of acute cardiopulmonary process.    General Visit Information:  SLP Received On: 24  Patient Class: Inpatient  Living Environment: Home  Ordering Physician: Dr. Carranza  Reason for Referral: Swallow evaluation, rule out aspiration, determine if MBS needed  Prior to Session Communication: Bedside nurse    RN cleared pt to participate in session.    Pt reported no difficulty chewing or swallowing.      [employer] Diet: Regular/thin  Current Diet : Regular/thin    Status at time of evaluation:  Pain Assessment  Pain Assessment: 0-10  0-10  (Numeric) Pain Score: 0    Pt was alert, pleasant, and cooperative for session.  Orientation: Oriented to self, Oriented to hospital but not name of facility, and Partially oriented to situation  Ability to follow functional commands: Follows one-step commands appropriately  Nutritional status: Appears well-nourished/no concerns    Respiratory status: Room air  Baseline Vocal Quality: Normal  Volitional Cough: Strong  Volitional Swallow: Within Functional Limits  Patient positioning: Upright in bed      OBJECTIVE  Clinical swallow evaluation completed and consisted of interview, oral motor assessment, and PO trials (thin liquids x4-5oz via straw, applesauce x3 bites, jason cracker x4 bites).  ORAL PHASE: Upper and lower dentures in place. Oral mucosa were pink, moist, and free of obvious lesions. Lingual strength and ROM were mildly diminished bilaterally. Labial seal was adequate. Mastication of regular solids was disorganized but adequate. A/P transit and oral clearance were WFL with independent use of liquid wash.  PHARYNGEAL PHASE: Laryngeal elevation was visualized or palpated with all trials, however adequacy of hyolaryngeal elevation/excursion cannot be determined at bedside. No immediate or delayed s/sx aspiration/penetration were observed with any consistencies.    Was 3oz challenge administered: Yes; pt drank 3oz of thin liquid in one attempt, without breaking, with no overt s/sx aspiration/penetration observed.      Treatment/Education:  Results and recommendations were relayed to: Patient  Education provided: Yes   Learner: Patient   Barriers to learning: Cognitive limitations barrier   Method of teaching: Verbal   Topic: role of ST, results of assessment, and risk for aspiration   Outcome of teaching: Unable to demonstrate understanding at this time and Pt verbalized understanding  Treatment provided: No

## 2024-09-23 NOTE — PROGRESS NOTES
Physical Therapy    Physical Therapy Evaluation    Patient Name: Adriana Duran  MRN: [id number]  Department: [address]  Room: [address] [place] [address]  Today's Date: 9/23/2024   Time Calculation  Start Time: 1153  Stop Time: 1208  Time Calculation (min): 15 min    Assessment/Plan   PT Assessment  PT Assessment Results: Decreased strength, Decreased range of motion, Decreased endurance, Impaired balance, Decreased mobility, Decreased coordination, Decreased cognition, Impaired judgement, Decreased safety awareness  Rehab Prognosis: Good  Barriers to Discharge: pt is requiring increased assistance for safety with mobility  Evaluation/Treatment Tolerance: Patient limited by fatigue  Medical Staff Made Aware: Yes  Strengths: Premorbid level of function  Barriers to Participation: Comorbidities, Insight into problems  End of Session Communication: Bedside nurse  Assessment Comment: pt with decreased functional mobility, increased weakness, impaired tolerance to activity, and poor safety awareness. pt is functioning below her baseline and will benefit from continued skilled PT services to improve her functional performance and maximize safety.  End of Session Patient Position: Bed, 3 rail up, Alarm on (needs in reach)  IP OR SWING BED PT PLAN  Inpatient or Swing Bed: Inpatient  PT Plan  Treatment/Interventions: Bed mobility, Transfer training, Gait training, Balance training, Strengthening, Endurance training, Range of motion, Therapeutic activity, Therapeutic exercise  PT Plan: Ongoing PT  PT Frequency: 3 times per week  PT Discharge Recommendations: Moderate intensity level of continued care  Equipment Recommended upon Discharge: Wheeled walker  PT Recommended Transfer Status: Assist x1, Assistive device (rolling walker)  PT - OK to Discharge: Yes    Subjective   General Visit Information:  General  Reason for Referral: Impaired Mobility  Referred By: Nazanin Carranza MD  Past Medical History Relevant to Rehab: dementia, OA, CKD,  DM, HTN, hip fracture repair, L TKR, R TKR, L shoulder replacement, deperession, anxiety  Missed Visit: No  Missed Visit Reason: Other (Comment)  Family/Caregiver Present: No  Co-Treatment: OT  Co-Treatment Reason: optimize Pt safety and outcomes  Prior to Session Communication: Bedside nurse  Patient Position Received: Bed, 3 rail up, Alarm on  Preferred Learning Style: verbal  General Comment: 79 y.o. female went to the ED when spouse called 911 due to change in mental status, burning with urination, combativeness, and having a moist cough.  Home Living:  Home Living  Type of Home: House  Lives With: Spouse  Home Adaptive Equipment: Walker rolling or standard, Wheelchair-manual  Home Layout: One level  Home Access: Other (Comment) (4 RUBÉN per previous intake (questionable historian))  Bathroom Shower/Tub: Tub/shower unit  Bathroom Toilet: Standard  Home Living Comments: pt is a very poor historian  Prior Level of Function:  Prior Function Per Pt/Caregiver Report  Level of [place]: Independent with ADLs and functional transfers, Needs assistance with homemaking  Receives Help From: Family  ADL Assistance: Independent  Homemaking Assistance:  (spouse performs all tasks)  Ambulatory Assistance: Independent (rolling walker)  Vocational: Retired  Prior Function Comments: spouse assists as needed  Precautions:  Precautions  Medical Precautions: Fall precautions    Objective   Pain:  Pain Assessment  Pain Assessment: 0-10  0-10 (Numeric) Pain Score: 3  Pain Location: Generalized  Pain Interventions: Repositioned  Response to Interventions: no change  Cognition:  Cognition  Overall Cognitive Status: Impaired  Orientation Level: Disoriented to place, Disoriented to time, Disoriented to situation    General Assessments:  General Observation  General Observation: pt initially wearing very old, stretched out nonskid socks, replaced with new non skid socks in supine. very dry skin, onychomycosis of toenails    Activity  Tolerance  Endurance: Decreased tolerance for upright activites  Activity Tolerance Comments: fair  Rate of Perceived Exertion (RPE): 4    Sensation  Light Touch: No apparent deficits    Strength  Strength Comments: B LE 3+n to 4-/5 based on funciton  Coordination  Coordination Comment: decreased rate and accuracy of movement, increased time to complete tasks    Postural Control  Posture Comment: rounded shoulders, forward head posture    Static Sitting Balance  Static Sitting-Balance Support: Bilateral upper extremity supported, Feet supported  Static Sitting-Level of Assistance: Contact guard  Static Sitting-Comment/Number of Minutes: good- static sitting balance    Static Standing Balance  Static Standing-Balance Support: Bilateral upper extremity supported  Static Standing-Level of Assistance: Minimum assistance  Static Standing-Comment/Number of Minutes: fair+ static standing balance with B hands on rolling walker  Dynamic Standing Balance  Dynamic Standing-Balance Support: Bilateral upper extremity supported  Dynamic Standing-Level of Assistance: Moderate assistance  Dynamic Standing-Comments: fair balance with rolling walker with side stepping  Functional Assessments:  ADL  ADL's Addressed: No    Bed Mobility  Bed Mobility: Yes  Bed Mobility 1  Bed Mobility 1: Supine to sitting  Level of Assistance 1: Moderate assistance  Bed Mobility Comments 1: min A to guide B LEs off EOB, mod A to elevate trunk and square hips at EOB.  Bed Mobility 2  Bed Mobility  2: Sitting to supine  Level of Assistance 2: Minimum assistance  Bed Mobility Comments 2: min A to guide turnk down and lift B LEs into the bed.  Bed Mobility 3  Bed Mobility 3: Scooting  Level of Assistance 3: Maximum assistance, +2  Bed Mobility Comments 3: boosted to HOB with max A x 2 using the draw sheet.    Transfers  Transfer: Yes  Transfer 1  Transfer From 1: Bed to, Stand to  Transfer to 1: Stand, Bed  Technique 1: Sit to stand, Stand to sit  Transfer  Device 1: Walker  Transfer Level of Assistance 1: Minimum assistance, Minimal verbal cues, +2  Trials/Comments 1: verbal cues for hand placement along with manual guidnace of hands off the walker to the bed prior to standing. min A x 2 to guide turnk up to stand and establish COG over PORTIA. verbal cues for safety, sequencing, and hand placement with min A for eccentric control in sitting.    Ambulation/Gait Training  Ambulation/Gait Training Performed:  (Amb 3' to HOB via side stepping with rolling walker and min A x 2 for balance and safety. wide PORTIA and increased toe-out posture with right foot.)    Stairs  Stairs: No  Extremity/Trunk Assessments:  RLE   RLE :  (decreased full ROM at end ranges, + weakness)  LLE   LLE :  (decreased full ROM at end ranges, + weakness)  Outcome Measures:  [facility] Basic Mobility  Turning from your back to your side while in a flat bed without using bedrails: A lot  Moving from lying on your back to sitting on the side of a flat bed without using bedrails: A lot  Moving to and from bed to chair (including a wheelchair): A little  Standing up from a chair using your arms (e.g. wheelchair or bedside chair): A little  To walk in hospital room: A lot  Climbing 3-5 steps with railing: A lot  Basic Mobility - Total Score: 14    Encounter Problems       Encounter Problems (Active)       Mobility       STG - Patient will ambulate 50' with rolling walker and close supervision (Progressing)       Start:  09/23/24    Expected End:  10/20/24               PT Transfers       STG - Transfer from bed to chair with close supervision. (Progressing)       Start:  09/23/24    Expected End:  10/20/24            STG - Patient to transfer to and from sit to supine with HOB flat and no bed rail with close superivison (Progressing)       Start:  09/23/24    Expected End:  10/20/24            STG - Patient will transfer sit to and from stand with close supervision (Progressing)       Start:  09/23/24    Expected  End:  10/20/24                   Education Documentation  Mobility Training, taught by Melany Garcia, PT at 9/23/2024  4:48 PM.  Learner: Patient  Readiness: Acceptance  Method: Explanation  Response: Needs Reinforcement    Education Comments  No comments found.

## 2024-09-24 ENCOUNTER — APPOINTMENT (OUTPATIENT)
Dept: CARDIOLOGY | Facility: HOSPITAL | Age: 79
DRG: 152 | End: 2024-09-24
Payer: MEDICARE

## 2024-09-24 LAB
ANION GAP SERPL CALCULATED.3IONS-SCNC: 13 MMOL/L (ref 10–20)
BACTERIA UR CULT: NORMAL
BUN SERPL-MCNC: 16 MG/DL (ref 6–23)
CALCIUM SERPL-MCNC: 8.9 MG/DL (ref 8.6–10.3)
CHLORIDE SERPL-SCNC: 106 MMOL/L (ref 98–107)
CO2 SERPL-SCNC: 26 MMOL/L (ref 21–32)
CREAT SERPL-MCNC: 1.06 MG/DL (ref 0.5–1.05)
EGFRCR SERPLBLD CKD-EPI 2021: 53 ML/MIN/1.73M*2
ERYTHROCYTE [DISTWIDTH] IN BLOOD BY AUTOMATED COUNT: 13.9 % (ref 11.5–14.5)
GLUCOSE BLD MANUAL STRIP-MCNC: 66 MG/DL (ref 74–99)
GLUCOSE BLD MANUAL STRIP-MCNC: 69 MG/DL (ref 74–99)
GLUCOSE BLD MANUAL STRIP-MCNC: 88 MG/DL (ref 74–99)
GLUCOSE SERPL-MCNC: 107 MG/DL (ref 74–99)
HCT VFR BLD AUTO: 34.1 % (ref 36–46)
HGB BLD-MCNC: 10.7 G/DL (ref 12–16)
MCH RBC QN AUTO: 30.1 PG (ref 26–34)
MCHC RBC AUTO-ENTMCNC: 31.4 G/DL (ref 32–36)
MCV RBC AUTO: 96 FL (ref 80–100)
NRBC BLD-RTO: 0 /100 WBCS (ref 0–0)
PLATELET # BLD AUTO: 166 X10*3/UL (ref 150–450)
POTASSIUM SERPL-SCNC: 4 MMOL/L (ref 3.5–5.3)
RBC # BLD AUTO: 3.56 X10*6/UL (ref 4–5.2)
SODIUM SERPL-SCNC: 141 MMOL/L (ref 136–145)
WBC # BLD AUTO: 9.2 X10*3/UL (ref 4.4–11.3)

## 2024-09-24 PROCEDURE — 93005 ELECTROCARDIOGRAM TRACING: CPT

## 2024-09-24 PROCEDURE — 36415 COLL VENOUS BLD VENIPUNCTURE: CPT | Performed by: INTERNAL MEDICINE

## 2024-09-24 PROCEDURE — 82947 ASSAY GLUCOSE BLOOD QUANT: CPT

## 2024-09-24 PROCEDURE — 2500000002 HC RX 250 W HCPCS SELF ADMINISTERED DRUGS (ALT 637 FOR MEDICARE OP, ALT 636 FOR OP/ED): Performed by: INTERNAL MEDICINE

## 2024-09-24 PROCEDURE — 80048 BASIC METABOLIC PNL TOTAL CA: CPT | Performed by: INTERNAL MEDICINE

## 2024-09-24 PROCEDURE — 2500000001 HC RX 250 WO HCPCS SELF ADMINISTERED DRUGS (ALT 637 FOR MEDICARE OP): Performed by: INTERNAL MEDICINE

## 2024-09-24 PROCEDURE — 97530 THERAPEUTIC ACTIVITIES: CPT | Mod: GO

## 2024-09-24 PROCEDURE — 2500000004 HC RX 250 GENERAL PHARMACY W/ HCPCS (ALT 636 FOR OP/ED): Performed by: INTERNAL MEDICINE

## 2024-09-24 PROCEDURE — 1100000001 HC PRIVATE ROOM DAILY

## 2024-09-24 PROCEDURE — 85027 COMPLETE CBC AUTOMATED: CPT | Performed by: INTERNAL MEDICINE

## 2024-09-24 PROCEDURE — 97535 SELF CARE MNGMENT TRAINING: CPT | Mod: GO

## 2024-09-24 RX ADMIN — DULOXETINE HYDROCHLORIDE 60 MG: 60 CAPSULE, DELAYED RELEASE ORAL at 09:38

## 2024-09-24 RX ADMIN — FLUTICASONE PROPIONATE 2 SPRAY: 50 SPRAY, METERED NASAL at 09:39

## 2024-09-24 RX ADMIN — SODIUM BICARBONATE 650 MG TABLET 650 MG: at 15:07

## 2024-09-24 RX ADMIN — BUSPIRONE HYDROCHLORIDE 10 MG: 10 TABLET ORAL at 09:38

## 2024-09-24 RX ADMIN — SODIUM BICARBONATE 650 MG TABLET 650 MG: at 20:00

## 2024-09-24 RX ADMIN — BUSPIRONE HYDROCHLORIDE 10 MG: 10 TABLET ORAL at 20:00

## 2024-09-24 RX ADMIN — OLANZAPINE 2.5 MG: 5 TABLET, FILM COATED ORAL at 20:00

## 2024-09-24 RX ADMIN — HEPARIN SODIUM 5000 UNITS: 5000 INJECTION, SOLUTION INTRAVENOUS; SUBCUTANEOUS at 05:18

## 2024-09-24 RX ADMIN — LOSARTAN POTASSIUM 100 MG: 100 TABLET, FILM COATED ORAL at 09:38

## 2024-09-24 RX ADMIN — VITAM B12 100 MCG: 100 TAB at 09:38

## 2024-09-24 RX ADMIN — ASPIRIN 81 MG: 81 TABLET, COATED ORAL at 09:38

## 2024-09-24 RX ADMIN — SODIUM BICARBONATE 650 MG TABLET 650 MG: at 09:38

## 2024-09-24 RX ADMIN — AMPICILLIN SODIUM AND SULBACTAM SODIUM 3 G: 2; 1 INJECTION, POWDER, FOR SOLUTION INTRAMUSCULAR; INTRAVENOUS at 09:31

## 2024-09-24 RX ADMIN — CITALOPRAM 40 MG: 40 TABLET, FILM COATED ORAL at 09:38

## 2024-09-24 RX ADMIN — AMPICILLIN SODIUM AND SULBACTAM SODIUM 3 G: 2; 1 INJECTION, POWDER, FOR SOLUTION INTRAMUSCULAR; INTRAVENOUS at 19:56

## 2024-09-24 RX ADMIN — AMPICILLIN SODIUM AND SULBACTAM SODIUM 3 G: 2; 1 INJECTION, POWDER, FOR SOLUTION INTRAMUSCULAR; INTRAVENOUS at 01:03

## 2024-09-24 RX ADMIN — AMPICILLIN SODIUM AND SULBACTAM SODIUM 3 G: 2; 1 INJECTION, POWDER, FOR SOLUTION INTRAMUSCULAR; INTRAVENOUS at 15:07

## 2024-09-24 RX ADMIN — BUSPIRONE HYDROCHLORIDE 10 MG: 10 TABLET ORAL at 15:07

## 2024-09-24 RX ADMIN — ATORVASTATIN CALCIUM 20 MG: 20 TABLET, FILM COATED ORAL at 20:00

## 2024-09-24 RX ADMIN — HEPARIN SODIUM 5000 UNITS: 5000 INJECTION, SOLUTION INTRAVENOUS; SUBCUTANEOUS at 18:14

## 2024-09-24 ASSESSMENT — COGNITIVE AND FUNCTIONAL STATUS - GENERAL
STANDING UP FROM CHAIR USING ARMS: A LITTLE
DAILY ACTIVITIY SCORE: 19
CLIMB 3 TO 5 STEPS WITH RAILING: A LOT
PERSONAL GROOMING: A LITTLE
TOILETING: A LOT
WALKING IN HOSPITAL ROOM: A LITTLE
DRESSING REGULAR UPPER BODY CLOTHING: A LITTLE
CLIMB 3 TO 5 STEPS WITH RAILING: A LITTLE
DRESSING REGULAR UPPER BODY CLOTHING: A LOT
PERSONAL GROOMING: A LITTLE
HELP NEEDED FOR BATHING: A LITTLE
STANDING UP FROM CHAIR USING ARMS: A LITTLE
DAILY ACTIVITIY SCORE: 18
MOBILITY SCORE: 18
HELP NEEDED FOR BATHING: A LITTLE
TURNING FROM BACK TO SIDE WHILE IN FLAT BAD: A LITTLE
MOVING FROM LYING ON BACK TO SITTING ON SIDE OF FLAT BED WITH BEDRAILS: A LITTLE
WALKING IN HOSPITAL ROOM: A LITTLE
DRESSING REGULAR LOWER BODY CLOTHING: A LOT
MOVING TO AND FROM BED TO CHAIR: A LITTLE
DRESSING REGULAR LOWER BODY CLOTHING: A LITTLE
PERSONAL GROOMING: A LITTLE
MOBILITY SCORE: 19
MOVING TO AND FROM BED TO CHAIR: A LITTLE
HELP NEEDED FOR BATHING: A LOT
TOILETING: A LITTLE
DRESSING REGULAR LOWER BODY CLOTHING: A LITTLE
DRESSING REGULAR UPPER BODY CLOTHING: A LITTLE
DAILY ACTIVITIY SCORE: 15
TOILETING: A LITTLE
EATING MEALS: A LITTLE

## 2024-09-24 ASSESSMENT — ACTIVITIES OF DAILY LIVING (ADL)
HOME_MANAGEMENT_TIME_ENTRY: 30
BATHING_WHERE_ASSESSED: OTHER (COMMENT)
BATHING_LEVEL_OF_ASSISTANCE: MINIMUM ASSISTANCE

## 2024-09-24 ASSESSMENT — PAIN SCALES - GENERAL
PAINLEVEL_OUTOF10: 0 - NO PAIN

## 2024-09-24 ASSESSMENT — PAIN - FUNCTIONAL ASSESSMENT
PAIN_FUNCTIONAL_ASSESSMENT: 0-10
PAIN_FUNCTIONAL_ASSESSMENT: 0-10

## 2024-09-24 NOTE — CARE PLAN
The patient's goals for the shift include no fall or injury by the end of shift    The clinical goals for the shift include remain HDS      Problem: Skin  Goal: Prevent/minimize sheer/friction injuries  Flowsheets (Taken 9/24/2024 1041)  Prevent/minimize sheer/friction injuries:   Increase activity/out of bed for meals   HOB 30 degrees or less

## 2024-09-24 NOTE — PROGRESS NOTES
Occupational Therapy    Occupational Therapy Treatment    Name: Adriana Duran  MRN: [id number]  Department: [address]  Room: [address] [place] [address]  Date: 09/24/24  Time Calculation  Start Time: 0950  Stop Time: 1028  Time Calculation (min): 38 min    Assessment:  OT Assessment: Patient will continue to benefit from skilled OT to increase patient's safety and independence with daily tasks.  Prognosis: Good  Barriers to Discharge: Other (Comment) (decreased cognition)  Evaluation/Treatment Tolerance: Patient tolerated treatment well  End of Session Communication: Bedside nurse  End of Session Patient Position: Up in chair, Alarm on  Plan:  Treatment Interventions: ADL retraining, Functional transfer training, UE strengthening/ROM, Endurance training, Cognitive reorientation, Patient/family training, Compensatory technique education, Equipment evaluation/education  OT Frequency: 4 times per week  OT Discharge Recommendations: Moderate intensity level of continued care  Equipment Recommended upon Discharge: Wheeled walker  OT Recommended Transfer Status: Assist of 1 (with 2WW)  OT - OK to Discharge: Yes    Subjective   Previous Visit Info:  OT Last Visit  OT Received On: 09/24/24  General:  General  Reason for Referral: decline in ADLs  Referred By: Nazanin Carranza MD  Past Medical History Relevant to Rehab: dementia, OA, CKD, DM, HTN, hip fracture repair, L TKR, R TKR, L shoulder replacement, deperession, anxiety  Family/Caregiver Present: No  Prior to Session Communication: Bedside nurse  Patient Position Received: Bed, 3 rail up, Alarm on  Preferred Learning Style: verbal  General Comment: Patient is cleared by nursing for therapy. Patient in bed upon arrival and agreeable to participate  Precautions:  Medical Precautions: Fall precautions    Pain Assessment:  Pain Assessment  Pain Assessment: 0-10  0-10 (Numeric) Pain Score: 0 - No pain     Objective   Cognition:  Cognition Comments: patient pleasant and cooperative. able  to follow one step commands with increased cues and time  Insight: Moderate  Impulsive: Mildly  Processing Speed: Delayed  Activities of Daily Living: Grooming  Grooming Level of Assistance: Close supervision, Minimum assistance  Grooming Where Assessed: Chair  Grooming Comments: wash face, brush teeth, assistance for dentures, assistance to detangle hair    UE Bathing  UE Bathing Level of Assistance: Minimum assistance  UE Bathing Where Assessed: Other (Comment) (bedside chair)  UE Bathing Comments: UB sponge bath, assistance for thoroughness    LE Bathing  LE Bathing Level of Assistance: Minimum assistance  LE Bathing Where Assessed: Other (Comment) (bedside chair)  LE Bathing Comments: LB sponge bath, assistance to wash both feet    UE Dressing  UE Dressing Level of Assistance: Minimum assistance  UE Dressing Where Assessed: Chair  UE Dressing Comments: doff/don gown    LE Dressing  LE Dressing: Yes  Sock Level of Assistance: Setup, Maximum assistance  Adult Briefs Level of Assistance: Dependent (doff/don brief)  LE Dressing Where Assessed: Chair  LE Dressing Comments: patient able to doff socks with S/U, Max A to don socks    Toileting  Toileting Level of Assistance: Maximum assistance  Toileting Comments: standing at 2WW, patient requires Max A to wash sony area    Functional Standing Tolerance:  Functional Standing Tolerance  Time: ~1 minute  Activity: static standing  Functional Standing Tolerance Comments: CGA using 2WW  Bed Mobility/Transfers: Bed Mobility  Bed Mobility: Yes  Bed Mobility 1  Bed Mobility 1: Supine to sitting  Level of Assistance 1: Minimum assistance  Bed Mobility Comments 1: head of bed elevated, increased time, effortful, use of bed rails. assist for trunk up    Transfers  Transfer: Yes  Transfer 1  Transfer From 1: Bed to  Transfer to 1: Stand  Technique 1: Sit to stand  Transfer Device 1: Walker  Transfer Level of Assistance 1: Minimum assistance, Minimal verbal cues  Trials/Comments 1:  cues for proper hand placement, patient then turns to be seated in the chair  Transfers 2  Transfer From 2: Chair with arms to  Transfer to 2: Stand  Technique 2: Sit to stand  Transfer Device 2: Walker  Transfer Level of Assistance 2: Minimum assistance, Minimal verbal cues  Trials/Comments 2: x3 trials throughout    Functional Mobility:  Functional Mobility  Functional Mobility Performed: Yes  Functional Mobility 1  Surface 1: Level tile  Device 1: Rolling walker  Assistance 1: Minimum assistance  Comments 1: < household distances. No LOB, cues for safety  Sitting Balance:  Static Sitting Balance  Static Sitting-Balance Support: Feet supported, Bilateral upper extremity supported  Static Sitting-Level of Assistance: Close supervision  Static Sitting-Comment/Number of Minutes: at EOB  Standing Balance:  Static Standing Balance  Static Standing-Balance Support: Bilateral upper extremity supported  Static Standing-Level of Assistance: Contact guard    Therapy/Activity: Therapeutic Activity  Therapeutic Activity Performed: Yes  Therapeutic Activity 1: patient requires increased time throughout tasks to complete to highest level of independence    Strength:  Strength  Strength Comments: B LE 3+n to 4-/5 based on funciton    RUE   RUE : Within Functional Limits and LUE   LUE: Within Functional Limits    Outcome Measures:  [facility] Daily Activity  Putting on and taking off regular lower body clothing: A lot  Bathing (including washing, rinsing, drying): A lot  Putting on and taking off regular upper body clothing: A lot  Toileting, which includes using toilet, bedpan or urinal: A lot  Taking care of personal grooming such as brushing teeth: A little  Eating Meals: None  Daily Activity - Total Score: 15    Education Documentation  ADL Training, taught by Staci Chi OT at 9/24/2024 10:40 AM.  Learner: Patient  Readiness: Acceptance  Method: Explanation, Demonstration  Response: Verbalizes Understanding, Needs  Reinforcement    Education Comments  No comments found.      Goals:  Encounter Problems       Encounter Problems (Active)       ADL       Goal 1 (Progressing)       Start:  09/23/24    Expected End:  10/04/24       Patient will demonstrate improved ADL skills:  Bathing with Min assist with adaptive equipment/DME prn      Grooming with supervision assist    UE Dressing with supervision assist         LE Dressing with Min assist with adaptive equipment/DME prn       Toileting with Min assist with adaptive equipment/DME prn

## 2024-09-24 NOTE — PROGRESS NOTES
Adriana Duran is a 79 y.o. female on day 2 of admission presenting with Altered behavior in Alzheimer's disease (Multi).      Subjective    Patient seen and examined. Patient confused, no new c/o. Denies any pain.     Objective she is alert she is speaking in sentences clearly but she is also disorganized and does not really seem to know why she is here pleasant cooperative  Extraocular motion intact  Heart regular rate and rhythm without murmurs rubs or gallops  Lungs clear to auscultation normal to percussion  Abdomen soft nontender nondistended  Extremities 1+ pitting bilateral leg edema  Neuro cranial nerves intact good tone strength in arms and legs.    Last Recorded Vitals  BP (!) 95/45 (BP Location: Right arm, Patient Position: Lying)   Pulse 72   Temp 37 °C (98.6 °F) (Oral)   Resp 18   Wt 78.5 kg (173 lb)   SpO2 97%   Intake/Output last 3 Shifts:    Intake/Output Summary (Last 24 hours) at 9/24/2024 1610  Last data filed at 9/24/2024 1507  Gross per 24 hour   Intake 400 ml   Output 200 ml   Net 200 ml       Admission Weight  Weight: 78.5 kg (173 lb) (09/22/24 0902)    Daily Weight  09/22/24 : 78.5 kg (173 lb)    Image Results  XR chest 1 view  Narrative: Interpreted By:  Dionte Ramos,   STUDY:  XR CHEST 1 VIEW;  9/22/2024 2:33 pm      INDICATION:  Signs/Symptoms:cough, sinus infection.          COMPARISON:  April 19, 2018 chest CT, December 28, 2023 chest radiograph.      ACCESSION NUMBER(S):  [id number]      ORDERING CLINICIAN:  RATNA MANZANO      FINDINGS:  AP radiograph of the chest was provided.      The patient is rotated. Overlapping radiopaque leads.      CARDIOMEDIASTINAL SILHOUETTE:  Similar cardiomediastinal silhouette.      LUNGS:  Similar expansion and aeration allowing for difference in patient  position without evident consolidative pneumonia, edema, or effusion.      ABDOMEN:  No remarkable upper abdominal findings.      BONES:  No acute osseous changes.       Impression: 1.  No evidence of acute cardiopulmonary process.              MACRO:  None      Signed by: Dionte Ramos 9/22/2024 3:10 PM  Dictation workstation:   [id number]  CT head wo IV contrast  Narrative: Interpreted By:  Tom Norwood,   STUDY:  CT HEAD WO IV CONTRAST;  9/22/2024 10:14 am      INDICATION:  Signs/Symptoms:confusion, hx of UTIs.      COMPARISON:  CT head 09/05/2023.      ACCESSION NUMBER(S):  [id number]      ORDERING CLINICIAN:  RATNA MANZANO      TECHNIQUE:  Noncontrast axial CT scan of head was performed.      FINDINGS:  Parenchyma: No intracranial hemorrhage. The grey-white  differentiation is intact. No mass effect or midline shift. Patchy  periventricular white matter hypodensities, likely chronic  microvascular ischemic change. Moderate parenchymal atrophy.      CSF Spaces: The ventricles, sulci and basal cisterns are proportional  to degree of parenchymal volume loss.      Extra-Axial Fluid: None.      Calvarium: No acute depressed fracture.      Paranasal sinuses: Near complete opacification of bilateral sphenoid  sinuses with air bubbles. Bilateral maxillary mucosal thickening with  air-fluid levels. Ethmoid mucosal thickening with partial air cell  opacification.      Mastoids: Clear.      Orbits: No acute abnormality.      Soft tissues: No acute abnormality.      Impression: No CT evidence of acute intracranial abnormality.      Severe paranasal sinusitis.      MACRO  None      Signed by: Tom Norwood 9/22/2024 11:06 AM  Dictation workstation:   [id number]      Physical Exam  Vitals reviewed.   HENT:      Head: Normocephalic.      Nose: Nose normal.      Mouth/Throat:      Mouth: Mucous membranes are moist.   Eyes:      Extraocular Movements: Extraocular movements intact.   Cardiovascular:      Rate and Rhythm: Regular rhythm.   Pulmonary:      Effort: Pulmonary effort is normal.   Abdominal:      General: Bowel sounds are normal.   Musculoskeletal:          General: Normal range of motion.      Cervical back: Neck supple.   Skin:     General: Skin is warm.   Neurological:      Mental Status: She is alert. She is disoriented.         Relevant Results      Results for orders placed or performed during the hospital encounter of 09/22/24 (from the past 24 hour(s))   POCT GLUCOSE   Result Value Ref Range    POCT Glucose 116 (H) 74 - 99 mg/dL   POCT GLUCOSE   Result Value Ref Range    POCT Glucose 114 (H) 74 - 99 mg/dL   CBC   Result Value Ref Range    WBC 9.2 4.4 - 11.3 x10*3/uL    nRBC 0.0 0.0 - 0.0 /100 WBCs    RBC 3.56 (L) 4.00 - 5.20 x10*6/uL    Hemoglobin 10.7 (L) 12.0 - 16.0 g/dL    Hematocrit 34.1 (L) 36.0 - 46.0 %    MCV 96 80 - 100 fL    MCH 30.1 26.0 - 34.0 pg    MCHC 31.4 (L) 32.0 - 36.0 g/dL    RDW 13.9 11.5 - 14.5 %    Platelets 166 150 - 450 x10*3/uL   Basic Metabolic Panel   Result Value Ref Range    Glucose 107 (H) 74 - 99 mg/dL    Sodium 141 136 - 145 mmol/L    Potassium 4.0 3.5 - 5.3 mmol/L    Chloride 106 98 - 107 mmol/L    Bicarbonate 26 21 - 32 mmol/L    Anion Gap 13 10 - 20 mmol/L    Urea Nitrogen 16 6 - 23 mg/dL    Creatinine 1.06 (H) 0.50 - 1.05 mg/dL    eGFR 53 (L) >60 mL/min/1.73m*2    Calcium 8.9 8.6 - 10.3 mg/dL   POCT GLUCOSE   Result Value Ref Range    POCT Glucose 69 (L) 74 - 99 mg/dL   POCT GLUCOSE   Result Value Ref Range    POCT Glucose 88 74 - 99 mg/dL      XR chest 1 view    Result Date: 9/22/2024  Interpreted By:  Dionte Ramos, STUDY: XR CHEST 1 VIEW;  9/22/2024 2:33 pm   INDICATION: Signs/Symptoms:cough, sinus infection.     COMPARISON: April 19, 2018 chest CT, December 28, 2023 chest radiograph.   ACCESSION NUMBER(S): [id number]   ORDERING CLINICIAN: RATNA MANZANO   FINDINGS: AP radiograph of the chest was provided.   The patient is rotated. Overlapping radiopaque leads.   CARDIOMEDIASTINAL SILHOUETTE: Similar cardiomediastinal silhouette.   LUNGS: Similar expansion and aeration allowing for difference in patient position  without evident consolidative pneumonia, edema, or effusion.   ABDOMEN: No remarkable upper abdominal findings.   BONES: No acute osseous changes.       1.  No evidence of acute cardiopulmonary process.       MACRO: None   Signed by: Dionte Ramos 9/22/2024 3:10 PM Dictation workstation:   [id number]    CT head wo IV contrast    Result Date: 9/22/2024  Interpreted By:  Tom Norwood, STUDY: CT HEAD WO IV CONTRAST;  9/22/2024 10:14 am   INDICATION: Signs/Symptoms:confusion, hx of UTIs.   COMPARISON: CT head 09/05/2023.   ACCESSION NUMBER(S): [id number]   ORDERING CLINICIAN: RATNA MANZANO   TECHNIQUE: Noncontrast axial CT scan of head was performed.   FINDINGS: Parenchyma: No intracranial hemorrhage. The grey-white differentiation is intact. No mass effect or midline shift. Patchy periventricular white matter hypodensities, likely chronic microvascular ischemic change. Moderate parenchymal atrophy.   CSF Spaces: The ventricles, sulci and basal cisterns are proportional to degree of parenchymal volume loss.   Extra-Axial Fluid: None.   Calvarium: No acute depressed fracture.   Paranasal sinuses: Near complete opacification of bilateral sphenoid sinuses with air bubbles. Bilateral maxillary mucosal thickening with air-fluid levels. Ethmoid mucosal thickening with partial air cell opacification.   Mastoids: Clear.   Orbits: No acute abnormality.   Soft tissues: No acute abnormality.       No CT evidence of acute intracranial abnormality.   Severe paranasal sinusitis.   MACRO None   Signed by: Tom Norwood 9/22/2024 11:06 AM Dictation workstation:   [id number]           Assessment & Plan  Delirium  Patient seem to have acute sinusitis and ethmoid. Will continue with Unasyn.   Sinusitis, acute frontal  In addition antibiotics some ordering Afrin for the next 3 to 3 days and Flonase.  Dementia  Will keep her on her regular meds which include 2 antidepressants and Zyprexa at bedtime  Gait  disorder  PT/OT-SNF placement  Altered behavior in Alzheimer's disease (Multi)    Discharge plan  SNF tomorrow on oral ATB    Jenn Gonzalez, APRN-CNP

## 2024-09-24 NOTE — PROGRESS NOTES
Anticipate discharge soon. Patient's spouse called and said that he would like a referral sent to [facility]. Waiting on acceptance. Will follow.      09/24/24 5518   Discharge Planning   Home or Post Acute Services Post acute facilities (Rehab/SNF/etc)   Type of Post Acute Facility Services Rehab   Expected Discharge Disposition SNF  ([facility]-waiting on acceptance)   Does the patient need discharge transport arranged? Yes   RoundTrip coordination needed? Yes

## 2024-09-25 ENCOUNTER — APPOINTMENT (OUTPATIENT)
Dept: PRIMARY CARE | Facility: CLINIC | Age: 79
End: 2024-09-25
Payer: MEDICARE

## 2024-09-25 VITALS
TEMPERATURE: 99 F | SYSTOLIC BLOOD PRESSURE: 98 MMHG | DIASTOLIC BLOOD PRESSURE: 63 MMHG | WEIGHT: 173 LBS | RESPIRATION RATE: 16 BRPM | OXYGEN SATURATION: 93 % | HEIGHT: 66 IN | BODY MASS INDEX: 27.8 KG/M2 | HEART RATE: 70 BPM

## 2024-09-25 DIAGNOSIS — L97.524 NON-PRESSURE CHRONIC ULCER OF OTHER PART OF LEFT FOOT WITH NECROSIS OF BONE: ICD-10-CM

## 2024-09-25 DIAGNOSIS — F41.9 ANXIETY: ICD-10-CM

## 2024-09-25 DIAGNOSIS — I10 HTN (HYPERTENSION), BENIGN: ICD-10-CM

## 2024-09-25 DIAGNOSIS — E78.2 MIXED HYPERLIPIDEMIA: ICD-10-CM

## 2024-09-25 DIAGNOSIS — F33.41 RECURRENT MAJOR DEPRESSIVE DISORDER, IN PARTIAL REMISSION (CMS-HCC): ICD-10-CM

## 2024-09-25 DIAGNOSIS — M86.9 OSTEOMYELITIS OF GREAT TOE OF LEFT FOOT (MULTI): ICD-10-CM

## 2024-09-25 DIAGNOSIS — E11.21 DIABETIC NEPHROPATHY WITH PROTEINURIA (MULTI): ICD-10-CM

## 2024-09-25 DIAGNOSIS — E11.42 DIABETIC PERIPHERAL NEUROPATHY (MULTI): ICD-10-CM

## 2024-09-25 DIAGNOSIS — L97.529: ICD-10-CM

## 2024-09-25 DIAGNOSIS — E11.8 CONTROLLED TYPE 2 DIABETES MELLITUS WITH COMPLICATION, WITHOUT LONG-TERM CURRENT USE OF INSULIN (MULTI): Primary | ICD-10-CM

## 2024-09-25 DIAGNOSIS — R79.89 ELEVATED FERRITIN: ICD-10-CM

## 2024-09-25 DIAGNOSIS — E11.65 TYPE 2 DIABETES MELLITUS WITH HYPERGLYCEMIA, UNSPECIFIED WHETHER LONG TERM INSULIN USE (MULTI): ICD-10-CM

## 2024-09-25 LAB
GLUCOSE BLD MANUAL STRIP-MCNC: 120 MG/DL (ref 74–99)
GLUCOSE BLD MANUAL STRIP-MCNC: 98 MG/DL (ref 74–99)

## 2024-09-25 PROCEDURE — 82947 ASSAY GLUCOSE BLOOD QUANT: CPT

## 2024-09-25 PROCEDURE — 2500000001 HC RX 250 WO HCPCS SELF ADMINISTERED DRUGS (ALT 637 FOR MEDICARE OP): Performed by: INTERNAL MEDICINE

## 2024-09-25 PROCEDURE — 97535 SELF CARE MNGMENT TRAINING: CPT | Mod: GO

## 2024-09-25 PROCEDURE — 2500000004 HC RX 250 GENERAL PHARMACY W/ HCPCS (ALT 636 FOR OP/ED): Performed by: INTERNAL MEDICINE

## 2024-09-25 PROCEDURE — 2500000002 HC RX 250 W HCPCS SELF ADMINISTERED DRUGS (ALT 637 FOR MEDICARE OP, ALT 636 FOR OP/ED): Performed by: INTERNAL MEDICINE

## 2024-09-25 PROCEDURE — 97110 THERAPEUTIC EXERCISES: CPT | Mod: GO

## 2024-09-25 RX ORDER — AMOXICILLIN AND CLAVULANATE POTASSIUM 875; 125 MG/1; MG/1
1 TABLET, FILM COATED ORAL 2 TIMES DAILY
Start: 2024-09-25 | End: 2024-10-03

## 2024-09-25 RX ORDER — ACETAMINOPHEN 325 MG/1
650 TABLET ORAL EVERY 6 HOURS PRN
Start: 2024-09-25

## 2024-09-25 RX ORDER — FLUTICASONE PROPIONATE 50 MCG
2 SPRAY, SUSPENSION (ML) NASAL DAILY
Qty: 16 G | Refills: 12 | Status: SHIPPED | OUTPATIENT
Start: 2024-09-26

## 2024-09-25 RX ADMIN — ASPIRIN 81 MG: 81 TABLET, COATED ORAL at 10:35

## 2024-09-25 RX ADMIN — AMPICILLIN SODIUM AND SULBACTAM SODIUM 3 G: 2; 1 INJECTION, POWDER, FOR SOLUTION INTRAMUSCULAR; INTRAVENOUS at 01:03

## 2024-09-25 RX ADMIN — LOSARTAN POTASSIUM 100 MG: 100 TABLET, FILM COATED ORAL at 10:34

## 2024-09-25 RX ADMIN — VITAM B12 100 MCG: 100 TAB at 10:34

## 2024-09-25 RX ADMIN — BUSPIRONE HYDROCHLORIDE 10 MG: 10 TABLET ORAL at 10:34

## 2024-09-25 RX ADMIN — CITALOPRAM 40 MG: 40 TABLET, FILM COATED ORAL at 10:35

## 2024-09-25 RX ADMIN — AMPICILLIN SODIUM AND SULBACTAM SODIUM 3 G: 2; 1 INJECTION, POWDER, FOR SOLUTION INTRAMUSCULAR; INTRAVENOUS at 08:33

## 2024-09-25 RX ADMIN — SODIUM BICARBONATE 650 MG TABLET 650 MG: at 10:34

## 2024-09-25 RX ADMIN — FLUTICASONE PROPIONATE 2 SPRAY: 50 SPRAY, METERED NASAL at 10:37

## 2024-09-25 RX ADMIN — HEPARIN SODIUM 5000 UNITS: 5000 INJECTION, SOLUTION INTRAVENOUS; SUBCUTANEOUS at 05:09

## 2024-09-25 RX ADMIN — DULOXETINE HYDROCHLORIDE 60 MG: 60 CAPSULE, DELAYED RELEASE ORAL at 10:34

## 2024-09-25 ASSESSMENT — COGNITIVE AND FUNCTIONAL STATUS - GENERAL
CLIMB 3 TO 5 STEPS WITH RAILING: A LOT
DRESSING REGULAR UPPER BODY CLOTHING: A LOT
MOVING TO AND FROM BED TO CHAIR: A LITTLE
STANDING UP FROM CHAIR USING ARMS: A LITTLE
TOILETING: A LOT
DRESSING REGULAR UPPER BODY CLOTHING: A LITTLE
HELP NEEDED FOR BATHING: A LOT
WALKING IN HOSPITAL ROOM: A LITTLE
DRESSING REGULAR LOWER BODY CLOTHING: A LOT
PERSONAL GROOMING: A LITTLE
HELP NEEDED FOR BATHING: A LITTLE
TOILETING: A LITTLE
MOBILITY SCORE: 18
TURNING FROM BACK TO SIDE WHILE IN FLAT BAD: A LITTLE
DAILY ACTIVITIY SCORE: 19
PERSONAL GROOMING: A LITTLE
DRESSING REGULAR LOWER BODY CLOTHING: A LITTLE
DAILY ACTIVITIY SCORE: 15

## 2024-09-25 ASSESSMENT — PAIN SCALES - GENERAL
PAINLEVEL_OUTOF10: 0 - NO PAIN
PAINLEVEL_OUTOF10: 3
PAINLEVEL_OUTOF10: 0 - NO PAIN

## 2024-09-25 ASSESSMENT — PAIN DESCRIPTION - DESCRIPTORS: DESCRIPTORS: ACHING

## 2024-09-25 ASSESSMENT — ACTIVITIES OF DAILY LIVING (ADL): HOME_MANAGEMENT_TIME_ENTRY: 10

## 2024-09-25 ASSESSMENT — PAIN - FUNCTIONAL ASSESSMENT: PAIN_FUNCTIONAL_ASSESSMENT: 0-10

## 2024-09-25 NOTE — PROGRESS NOTES
Occupational Therapy    OT Treatment    Patient Name: Adriana Duran  MRN: [id number]  Department: [address]  Room: [address] [place] [address]  Today's Date: 9/25/2024  Time Calculation  Start Time: 1115  Stop Time: 1140  Time Calculation (min): 25 min        Assessment:        Plan:  Treatment Interventions: ADL retraining, Functional transfer training, UE strengthening/ROM, Endurance training, Cognitive reorientation, Patient/family training, Compensatory technique education, Equipment evaluation/education  OT Frequency: 4 times per week  OT Discharge Recommendations: Moderate intensity level of continued care  Equipment Recommended upon Discharge: Wheeled walker  OT Recommended Transfer Status: Assist of 1 (with 2WW)  OT - OK to Discharge: Yes  Treatment Interventions: ADL retraining, Functional transfer training, UE strengthening/ROM, Endurance training, Cognitive reorientation, Patient/family training, Compensatory technique education, Equipment evaluation/education    Subjective   Previous Visit Info:  OT Last Visit  OT Received On: 09/25/24  General:  General  Reason for Referral: OT f/u  Patient Position Received: Up in chair, Alarm on  Preferred Learning Style: verbal  Precautions:  Medical Precautions: Fall precautions    Pain:  Pain Assessment  Pain Assessment: 0-10  0-10 (Numeric) Pain Score: 3  Pain Location: Neck  Pain Orientation:  (R trap)  Pain Descriptors: Aching    Objective    Cognition:  Cognition  Orientation Level: Disoriented to place, Disoriented to time, Disoriented to situation  Memory:  (poor STM)  Insight: Moderate  Coordination:  Movements are Fluid and Coordinated: No  Activities of Daily Living: Grooming  Grooming Level of Assistance: Maximum assistance    UE Dressing  UE Dressing Level of Assistance: Moderate assistance  Therapy/Activity: Therapeutic Exercise  Therapeutic Exercise Performed:  (Pt completed 10 reps of B UE AROM including shoulder flexion, shoulder elevation, side flexion)    Outcome  Measures:[facility] Daily Activity  Putting on and taking off regular lower body clothing: A lot  Bathing (including washing, rinsing, drying): A lot  Putting on and taking off regular upper body clothing: A lot  Toileting, which includes using toilet, bedpan or urinal: A lot  Taking care of personal grooming such as brushing teeth: A little  Eating Meals: None  Daily Activity - Total Score: 15    Education Documentation  No documentation found.  Education Comments  No comments found.    OP EDUCATION:     Goals:  Encounter Problems       Encounter Problems (Active)       ADL       Goal 1 (Progressing)       Start:  09/23/24    Expected End:  10/04/24       Patient will demonstrate improved ADL skills:  Bathing with Min assist with adaptive equipment/DME prn      Grooming with supervision assist    UE Dressing with supervision assist         LE Dressing with Min assist with adaptive equipment/DME prn       Toileting with Min assist with adaptive equipment/DME prn

## 2024-09-25 NOTE — CARE PLAN
The patient's goals for the shift include no fall or injury by the end of shift    The clinical goals for the shift include safety.  skin care    Over the shift, the patient did not make progress toward the following goals. Barriers to progression include cognitive status    Problem: Fall/Injury  Goal: Verbalize understanding of personal risk factors for fall in the hospital  Outcome: Not Progressing  Goal: Verbalize understanding of risk factor reduction measures to prevent injury from fall in the home  Outcome: Not Progressing

## 2024-09-25 NOTE — CARE PLAN
The patient's goals for the shift include no fall or injury by the end of shift    The clinical goals for the shift include D/C Planning        Problem: Discharge Planning  Goal: Discharge to home or other facility with appropriate resources  Outcome: Progressing     Problem: Chronic Conditions and Co-morbidities  Goal: Patient's chronic conditions and co-morbidity symptoms are monitored and maintained or improved  Outcome: Progressing     Problem: Fall/Injury  Goal: Verbalize understanding of personal risk factors for fall in the hospital  Outcome: Progressing  Goal: Verbalize understanding of risk factor reduction measures to prevent injury from fall in the home  Outcome: Progressing  Goal: Use assistive devices by end of the shift  Outcome: Progressing  Goal: Pace activities to prevent fatigue by end of the shift  Outcome: Progressing

## 2024-09-25 NOTE — DISCHARGE SUMMARY
Discharge Diagnosis  Altered behavior in Alzheimer's disease (Multi)    Issues Requiring Follow-Up  ATB  Fall precaution  Follow up with PCP    Discharge Meds     Medication List      START taking these medications     acetaminophen 325 mg tablet; Commonly known as: Tylenol; Take 2 tablets   (650 mg) by mouth every 6 hours if needed for moderate pain (4 - 6).   amoxicillin-pot clavulanate 875-125 mg tablet; Commonly known as:   Augmentin; Take 1 tablet by mouth 2 times a day for 8 days.   fluticasone 50 mcg/actuation nasal spray; Commonly known as: Flonase;   Administer 2 sprays into each nostril once daily. Shake gently. Before   first use, prime pump. After use, clean tip and replace cap.; Start taking   on: September 26, 2024   phenylephrine 0.25 % nasal spray; Commonly known as: Faisal-Synephrine;   Administer 2 sprays into each nostril 3 times a day for 9 doses. Do not   use for more than 3 days.     CONTINUE taking these medications     Adult Low Dose Aspirin 81 mg EC tablet; Generic drug: aspirin   atorvastatin 20 mg tablet; Commonly known as: Lipitor; Take 1 tablet (20   mg) by mouth once daily.   busPIRone 10 mg tablet; Commonly known as: Buspar; Take 1 tablet (10 mg)   by mouth 3 times a day.   citalopram 40 mg tablet; Commonly known as: CeleXA   docusate sodium 100 mg capsule; Commonly known as: Colace   DULoxetine 60 mg DR capsule; Commonly known as: Cymbalta; Take 1 capsule   (60 mg) by mouth once daily. Do not crush or chew.   losartan 100 mg tablet; Commonly known as: Cozaar; Take 1 tablet (100   mg) by mouth once daily.   multivitamin tablet   OLANZapine 2.5 mg tablet; Commonly known as: ZyPREXA   sodium bicarbonate 650 mg tablet   Vitamin B-12 1,000 mcg tablet; Generic drug: cyanocobalamin       Test Results Pending At Discharge  Pending Labs       No current pending labs.            Hospital Course   Adriana Duran is a 79 y.o. female presenting with altered mental status, moist cough.  Has  significant dementia so she cannot contribute to the history total.  During exam, she denies any active problems.  Specifically, she denies any pain.  Your  is at bedside and he is helping with history.  He is her primary caregiver.  According to him, during close to 3 days patient had severe moist cough.  She was getting more confused and combative.  Last night, she was talking to herself and talking nonsense 4 hours so he decided to bring her to the hospital since this happened in the past when she was sick with infections.  Patient has a history of falls in the past and multiple broken bones he was worried that she might fall.  Patient did not have nausea or emesis, she urinated 3 times last night, was incontinent of urine which is not normal for her.  Her appetite was good.  CT showed severe pansinusitis. Patient was placed on IV ATB which was switched too oral ATB. PT recommended SNF placement.    Pertinent Physical Exam At Time of Discharge  Physical Exam  Vitals reviewed.   HENT:      Head: Normocephalic.      Nose: Nose normal.      Mouth/Throat:      Mouth: Mucous membranes are moist.   Eyes:      Extraocular Movements: Extraocular movements intact.   Cardiovascular:      Rate and Rhythm: Regular rhythm.   Pulmonary:      Effort: Pulmonary effort is normal.   Abdominal:      General: Bowel sounds are normal.   Musculoskeletal:         General: Normal range of motion.      Cervical back: Neck supple.   Skin:     General: Skin is warm.   Neurological:      Mental Status: She is alert.      Comments: Baseline confusion         Outpatient Follow-Up  Future Appointments   Date Time Provider Department Center   9/25/2024  2:30 PM Tawanda Joel PA-C [id number] [facility]   12/12/2024  1:50 PM Rafael Ruiz MD [id number] [facility]         Jenn Gonzalez, APRN-CNP

## 2024-09-25 NOTE — PROGRESS NOTES
Patient with an active discharge. Patient will go to [facility]. All paperwork forwarded to [facility]. 7000 complete. RN to call report. Transport set for 3PM. Will follow as needed.      09/25/24 1332   Discharge Planning   Home or Post Acute Services Post acute facilities (Rehab/SNF/etc)   Type of Post Acute Facility Services Rehab   Expected Discharge Disposition SNF  ([facility])   Does the patient need discharge transport arranged? Yes   RoundTrip coordination needed? Yes

## 2024-09-27 NOTE — DOCUMENTATION CLARIFICATION NOTE
"    PATIENT:               LYNN GOMEZ  ACCT #:                  [id number]  MRN:                       [id number]  :                       1945  ADMIT DATE:       2024 8:45 AM  DISCH DATE:        2024 4:40 PM  RESPONDING PROVIDER #:        [id number]          PROVIDER RESPONSE TEXT:    PNA ruled out after workup    CDI QUERY TEXT:    Clarification        Instruction:    Based on your assessment of the patient and the clinical information, please provide the requested documentation by clicking on the appropriate radio button and enter any additional information if prompted.    Question: Please further clarify the diagnosis of PNA as    When answering this query, please exercise your independent professional judgment. The fact that a question is being asked, does not imply that any particular answer is desired or expected.    The patient's clinical indicators include:  Clinical Information: 79 y.o. female presenting with altered mental status, moist cough.    Clinical Indicators:    : CXR: \"No evidence of acute cardiopulmonary process.\"    : HP: \"According to him, during close to 3 days patient had severe moist cough.  She was getting more confused and combative.  Last night, she was talking to herself and talking nonsense 4 hours so he decided to bring her to the hospital since this happened in the past when she was sick with infections.\"    : HP: \"Delirium.  Patient comes with acute toxic encephalopathy on top of underlying dementia due to UTI plus minus early pneumonia on top of underlying dementia plus dehydration.\"  \"Patient has moist cough.  X-ray is unremarkable but patient looks dehydrated, maybe we do not see an early infiltrate.  Will treat with empiric antibiotics for community-acquired pneumonia due to gram-positive microorganisms.  I suspect patient might have some aspiration component.  According to her  sometimes she coughs when she eats.  Will consult speech " "therapy.  Use expectorants.\"    09/23: SLP: \"Grossly WFL oral phase and no suspected pharyngeal phase dysphagia based on clinical swallow evaluation. No skilled ST needs identified\"    Treatment: Ceftriaxone 1 gm iv x 1, Unasyn 3 gms iv q 6 hrs, Augmentin 875/ 125 mg p.o. x 1, Azithromycin 500 mg p.o. daily    Risk Factors: Dementia, Tobacco history, DM, Dehydration  Options provided:  -- PNA ruled out after workup  -- Community acquired PNA ruled in for this admission  -- Aspiration PNA ruled in for this admission  -- Other - I will add my own diagnosis  -- Refer to Clinical Documentation Reviewer    Query created by: Aleks Davis on 9/25/2024 12:39 PM      Electronically signed by:  EMILY GARAY 9/27/2024 1:39 PM          "

## 2024-10-28 ENCOUNTER — TELEPHONE (OUTPATIENT)
Dept: HOME HEALTH SERVICES | Facility: HOME HEALTH | Age: 79
End: 2024-10-28

## 2024-10-28 ENCOUNTER — HOME HEALTH ADMISSION (OUTPATIENT)
Dept: HOME HEALTH SERVICES | Facility: HOME HEALTH | Age: 79
End: 2024-10-28
Payer: MEDICARE

## 2024-10-28 ENCOUNTER — DOCUMENTATION (OUTPATIENT)
Dept: HOME HEALTH SERVICES | Facility: HOME HEALTH | Age: 79
End: 2024-10-28

## 2024-10-28 ENCOUNTER — TELEMEDICINE (OUTPATIENT)
Dept: PRIMARY CARE | Facility: CLINIC | Age: 79
End: 2024-10-28
Payer: MEDICARE

## 2024-10-28 DIAGNOSIS — E11.21 DIABETIC NEPHROPATHY WITH PROTEINURIA (MULTI): ICD-10-CM

## 2024-10-28 DIAGNOSIS — R29.6 FALLS: ICD-10-CM

## 2024-10-28 DIAGNOSIS — S72.146S CLOSED NONDISPLACED INTERTROCHANTERIC FRACTURE OF FEMUR, UNSPECIFIED LATERALITY, SEQUELA: ICD-10-CM

## 2024-10-28 DIAGNOSIS — E11.8 CONTROLLED TYPE 2 DIABETES MELLITUS WITH COMPLICATION, WITHOUT LONG-TERM CURRENT USE OF INSULIN (MULTI): ICD-10-CM

## 2024-10-28 DIAGNOSIS — R41.82 ALTERED MENTAL STATUS, UNSPECIFIED ALTERED MENTAL STATUS TYPE: ICD-10-CM

## 2024-10-28 DIAGNOSIS — I10 HTN (HYPERTENSION), BENIGN: ICD-10-CM

## 2024-10-28 DIAGNOSIS — E78.2 MIXED HYPERLIPIDEMIA: ICD-10-CM

## 2024-10-28 DIAGNOSIS — F33.41 RECURRENT MAJOR DEPRESSIVE DISORDER, IN PARTIAL REMISSION (CMS-HCC): ICD-10-CM

## 2024-10-28 DIAGNOSIS — F41.9 ANXIETY: ICD-10-CM

## 2024-10-28 DIAGNOSIS — R79.89 ELEVATED FERRITIN: ICD-10-CM

## 2024-10-28 DIAGNOSIS — R53.1 WEAKNESS: ICD-10-CM

## 2024-10-28 DIAGNOSIS — J01.40 ACUTE NON-RECURRENT PANSINUSITIS: Primary | ICD-10-CM

## 2024-10-28 PROCEDURE — 1157F ADVNC CARE PLAN IN RCRD: CPT | Performed by: PHYSICIAN ASSISTANT

## 2024-10-28 PROCEDURE — 1036F TOBACCO NON-USER: CPT | Performed by: PHYSICIAN ASSISTANT

## 2024-10-28 PROCEDURE — 1159F MED LIST DOCD IN RCRD: CPT | Performed by: PHYSICIAN ASSISTANT

## 2024-10-28 PROCEDURE — 1160F RVW MEDS BY RX/DR IN RCRD: CPT | Performed by: PHYSICIAN ASSISTANT

## 2024-10-28 PROCEDURE — 99214 OFFICE O/P EST MOD 30 MIN: CPT | Performed by: PHYSICIAN ASSISTANT

## 2024-10-28 RX ORDER — DONEPEZIL HYDROCHLORIDE 5 MG/1
5 TABLET, FILM COATED ORAL NIGHTLY
Qty: 30 TABLET | Refills: 1 | Status: SHIPPED | OUTPATIENT
Start: 2024-10-28 | End: 2024-12-27

## 2024-10-28 RX ORDER — OLANZAPINE 2.5 MG/1
2.5 TABLET ORAL NIGHTLY
Qty: 90 TABLET | Refills: 1 | Status: SHIPPED | OUTPATIENT
Start: 2024-10-28 | End: 2025-04-26

## 2024-10-28 RX ORDER — CETIRIZINE HYDROCHLORIDE 10 MG/1
10 TABLET ORAL DAILY
Qty: 90 TABLET | Refills: 1 | Status: SHIPPED | OUTPATIENT
Start: 2024-10-28 | End: 2025-04-26

## 2024-10-28 ASSESSMENT — LIFESTYLE VARIABLES
HOW OFTEN DO YOU HAVE SIX OR MORE DRINKS ON ONE OCCASION: NEVER
SKIP TO QUESTIONS 9-10: 1
AUDIT-C TOTAL SCORE: 0
HOW OFTEN DO YOU HAVE A DRINK CONTAINING ALCOHOL: NEVER
HOW MANY STANDARD DRINKS CONTAINING ALCOHOL DO YOU HAVE ON A TYPICAL DAY: PATIENT DOES NOT DRINK

## 2024-10-28 NOTE — TELEPHONE ENCOUNTER
Good afternoon, [facility] received a referral for this patient, unfortunately we cannot use Weakness or any symptom code as a valid diagnosis for home care. Please update the referral with a diagnosis that would cause the weakness.     Thank you,   [facility] Intake

## 2024-10-31 ENCOUNTER — APPOINTMENT (OUTPATIENT)
Dept: PRIMARY CARE | Facility: CLINIC | Age: 79
End: 2024-10-31
Payer: MEDICARE

## 2024-11-04 ENCOUNTER — HOME CARE VISIT (OUTPATIENT)
Dept: HOME HEALTH SERVICES | Facility: HOME HEALTH | Age: 79
End: 2024-11-04
Payer: MEDICARE

## 2024-11-04 VITALS
OXYGEN SATURATION: 97 % | HEART RATE: 66 BPM | RESPIRATION RATE: 18 BRPM | DIASTOLIC BLOOD PRESSURE: 50 MMHG | TEMPERATURE: 97.7 F | SYSTOLIC BLOOD PRESSURE: 92 MMHG

## 2024-11-04 PROCEDURE — 1090000001 HH PPS REVENUE CREDIT

## 2024-11-04 PROCEDURE — 0023 HH SOC

## 2024-11-04 PROCEDURE — G0151 HHCP-SERV OF PT,EA 15 MIN: HCPCS | Mod: HHH

## 2024-11-04 PROCEDURE — 1090000002 HH PPS REVENUE DEBIT

## 2024-11-04 PROCEDURE — 169592 NO-PAY CLAIM PROCEDURE

## 2024-11-04 SDOH — HEALTH STABILITY: PHYSICAL HEALTH: EXERCISE TYPE: WRITTEN

## 2024-11-04 SDOH — HEALTH STABILITY: PHYSICAL HEALTH: PHYSICAL EXERCISE: 10

## 2024-11-04 ASSESSMENT — BALANCE ASSESSMENTS
NUDGED SCORE: 1
ARISING SCORE: 1
TURNING 360 DEGREES STEPS: 1 - CONTINUOUS STEPS
IMMEDIATE STANDING BALANCE FIRST 5 SECONDS: 1 - STEADY BUT USES WALKER OR OTHER SUPPORT
ARISES: 1 - ABLE, USES ARMS TO HELP
STANDING BALANCE: 1 - STEADY BUT WIDE STANCE AND USES CANE OR OTHER SUPPORT
SITTING DOWN: 1 - USES ARMS OR NOT SMOOTH MOTION
EYES CLOSED AT MAXIMUM POSITION NUDGED: 1 - STEADY
ATTEMPTS TO ARISE: 1 - ABLE, REQUIRES MORE THAN ONE ATTEMPT
SITTING BALANCE: 1 - STEADY, SAFE
NUDGED: 1 - STAGGERS, GRABS, CATCHES SELF
BALANCE SCORE: 10

## 2024-11-04 ASSESSMENT — GAIT ASSESSMENTS
TRUNK SCORE: 1
PATH SCORE: 1
STEP CONTINUITY: 0 - STOPPING OR DISCONTINUITY BETWEEN STEPS
INITIATION OF GAIT IMMEDIATELY AFTER GO: 0 - ANY HESITANCY OR MULTIPLE ATTEMPTS TO START
BALANCE AND GAIT SCORE: 16
WALKING STANCE: 0 - HEELS APART
GAIT SCORE: 6
STEP SYMMETRY: 0 - RIGHT AND LEFT STEP LENGTH NOT EQUAL
TRUNK: 1 - NO SWAY BUT FLEXION OF KNEES OR BACK OR SPREADS ARMS WHILE WALKING
PATH: 1 - MILD/MODERATE DEVIATION OR USES WALKING AID

## 2024-11-04 ASSESSMENT — ENCOUNTER SYMPTOMS
MUSCLE WEAKNESS: 1
SUBJECTIVE PAIN PROGRESSION: WAXING AND WANING
LOWEST PAIN SEVERITY IN PAST 24 HOURS: 3/10
PAIN LOCATION - RELIEVING FACTORS: REST
PAIN SEVERITY GOAL: 3/10
SHORTNESS OF BREATH: T
PAIN LOCATION: GENERALIZED
PAIN LOCATION - PAIN QUALITY: ACHING
ARTHRALGIAS: 1
PAIN LOCATION - PAIN SEVERITY: 6/10
PAIN LOCATION - PAIN FREQUENCY: INTERMITTENT
PAIN LOCATION - EXACERBATING FACTORS: MVMT
HIGHEST PAIN SEVERITY IN PAST 24 HOURS: 10/10
PERSON REPORTING PAIN: PATIENT
PAIN LOCATION - PAIN DURATION: VARIES
PAIN: 1

## 2024-11-04 ASSESSMENT — ACTIVITIES OF DAILY LIVING (ADL)
PHYSICAL TRANSFERS ASSESSED: 1
AMBULATION_DISTANCE/DURATION_TOLERATED: 50'
AMBULATION ASSISTANCE: STAND BY ASSIST
AMBULATION ASSISTANCE ON FLAT SURFACES: 1
AMBULATION ASSISTANCE: 1
ENTERING_EXITING_HOME: MINIMUM ASSIST
CURRENT_FUNCTION: STAND BY ASSIST
OASIS_M1830: 03

## 2024-11-05 PROCEDURE — 1090000002 HH PPS REVENUE DEBIT

## 2024-11-05 PROCEDURE — 1090000001 HH PPS REVENUE CREDIT

## 2024-11-06 PROCEDURE — 1090000002 HH PPS REVENUE DEBIT

## 2024-11-06 PROCEDURE — 1090000001 HH PPS REVENUE CREDIT

## 2024-11-07 PROCEDURE — 1090000002 HH PPS REVENUE DEBIT

## 2024-11-07 PROCEDURE — 1090000001 HH PPS REVENUE CREDIT

## 2024-11-08 PROCEDURE — G0180 MD CERTIFICATION HHA PATIENT: HCPCS | Performed by: PHYSICIAN ASSISTANT

## 2024-11-08 PROCEDURE — 1090000001 HH PPS REVENUE CREDIT

## 2024-11-08 PROCEDURE — 1090000002 HH PPS REVENUE DEBIT

## 2024-11-09 PROCEDURE — 1090000001 HH PPS REVENUE CREDIT

## 2024-11-09 PROCEDURE — 1090000002 HH PPS REVENUE DEBIT

## 2024-11-11 ENCOUNTER — HOME CARE VISIT (OUTPATIENT)
Dept: HOME HEALTH SERVICES | Facility: HOME HEALTH | Age: 79
End: 2024-11-11
Payer: MEDICARE

## 2024-11-11 VITALS
SYSTOLIC BLOOD PRESSURE: 102 MMHG | TEMPERATURE: 97.5 F | OXYGEN SATURATION: 97 % | DIASTOLIC BLOOD PRESSURE: 64 MMHG | HEART RATE: 74 BPM | RESPIRATION RATE: 18 BRPM

## 2024-11-11 PROCEDURE — G0151 HHCP-SERV OF PT,EA 15 MIN: HCPCS | Mod: HHH

## 2024-11-11 SDOH — HEALTH STABILITY: PHYSICAL HEALTH: PHYSICAL EXERCISE: 12

## 2024-11-11 SDOH — HEALTH STABILITY: PHYSICAL HEALTH: EXERCISE TYPE: WRITTEN

## 2024-11-11 ASSESSMENT — ENCOUNTER SYMPTOMS
PAIN LOCATION: BACK
PAIN: 1
PAIN LOCATION - PAIN QUALITY: ACHING
ARTHRALGIAS: 1
PAIN SEVERITY GOAL: 3/10
PAIN LOCATION - PAIN FREQUENCY: INTERMITTENT
HIGHEST PAIN SEVERITY IN PAST 24 HOURS: 10/10
PAIN LOCATION - RELIEVING FACTORS: REST
SUBJECTIVE PAIN PROGRESSION: GRADUALLY IMPROVING
PERSON REPORTING PAIN: PATIENT
PAIN LOCATION - EXACERBATING FACTORS: MVMT
MUSCLE WEAKNESS: 1
PAIN LOCATION - PAIN SEVERITY: 6/10
LOWEST PAIN SEVERITY IN PAST 24 HOURS: 3/10
PAIN LOCATION - PAIN DURATION: VARIES

## 2024-11-11 ASSESSMENT — ACTIVITIES OF DAILY LIVING (ADL)
CURRENT_FUNCTION: STAND BY ASSIST
AMBULATION ASSISTANCE ON FLAT SURFACES: 1
AMBULATION ASSISTANCE: 1
PHYSICAL TRANSFERS ASSESSED: 1
AMBULATION ASSISTANCE: STAND BY ASSIST
AMBULATION_DISTANCE/DURATION_TOLERATED: 75'

## 2024-11-15 ENCOUNTER — HOME CARE VISIT (OUTPATIENT)
Dept: HOME HEALTH SERVICES | Facility: HOME HEALTH | Age: 79
End: 2024-11-15
Payer: MEDICARE

## 2024-11-15 VITALS
HEART RATE: 75 BPM | SYSTOLIC BLOOD PRESSURE: 109 MMHG | DIASTOLIC BLOOD PRESSURE: 69 MMHG | OXYGEN SATURATION: 98 % | TEMPERATURE: 97.9 F

## 2024-11-15 PROCEDURE — G0151 HHCP-SERV OF PT,EA 15 MIN: HCPCS | Mod: HHH

## 2024-11-15 SDOH — HEALTH STABILITY: PHYSICAL HEALTH: EXERCISE TYPE: WRITTEN

## 2024-11-15 SDOH — HEALTH STABILITY: PHYSICAL HEALTH: PHYSICAL EXERCISE: 15

## 2024-11-15 ASSESSMENT — ACTIVITIES OF DAILY LIVING (ADL)
PHYSICAL TRANSFERS ASSESSED: 1
AMBULATION ASSISTANCE ON FLAT SURFACES: 1
AMBULATION_DISTANCE/DURATION_TOLERATED: 100'
AMBULATION ASSISTANCE: STAND BY ASSIST
CURRENT_FUNCTION: STAND BY ASSIST
AMBULATION ASSISTANCE: 1

## 2024-11-15 ASSESSMENT — ENCOUNTER SYMPTOMS
MUSCLE WEAKNESS: 1
ARTHRALGIAS: 1
DENIES PAIN: 1

## 2024-11-18 ENCOUNTER — HOME CARE VISIT (OUTPATIENT)
Dept: HOME HEALTH SERVICES | Facility: HOME HEALTH | Age: 79
End: 2024-11-18
Payer: MEDICARE

## 2024-11-18 VITALS
HEART RATE: 78 BPM | RESPIRATION RATE: 18 BRPM | DIASTOLIC BLOOD PRESSURE: 78 MMHG | TEMPERATURE: 98.4 F | SYSTOLIC BLOOD PRESSURE: 130 MMHG | OXYGEN SATURATION: 100 %

## 2024-11-18 DIAGNOSIS — E78.2 MIXED HYPERLIPIDEMIA: ICD-10-CM

## 2024-11-18 DIAGNOSIS — F41.9 ANXIETY: ICD-10-CM

## 2024-11-18 PROCEDURE — G0151 HHCP-SERV OF PT,EA 15 MIN: HCPCS | Mod: HHH

## 2024-11-18 RX ORDER — BUSPIRONE HYDROCHLORIDE 10 MG/1
10 TABLET ORAL 3 TIMES DAILY
Qty: 270 TABLET | Refills: 3 | Status: SHIPPED | OUTPATIENT
Start: 2024-11-18

## 2024-11-18 RX ORDER — ATORVASTATIN CALCIUM 20 MG/1
20 TABLET, FILM COATED ORAL DAILY
Qty: 90 TABLET | Refills: 3 | Status: SHIPPED | OUTPATIENT
Start: 2024-11-18

## 2024-11-18 SDOH — HEALTH STABILITY: PHYSICAL HEALTH: EXERCISE TYPE: WRITTEN

## 2024-11-18 SDOH — HEALTH STABILITY: PHYSICAL HEALTH: PHYSICAL EXERCISE: 17

## 2024-11-18 ASSESSMENT — ENCOUNTER SYMPTOMS
PAIN LOCATION - PAIN SEVERITY: 6/10
PAIN LOCATION: GENERALIZED
PAIN LOCATION - EXACERBATING FACTORS: MVMT
PAIN SEVERITY GOAL: 3/10
MUSCLE WEAKNESS: 1
LOWEST PAIN SEVERITY IN PAST 24 HOURS: 3/10
SUBJECTIVE PAIN PROGRESSION: WAXING AND WANING
PAIN LOCATION - RELIEVING FACTORS: REST
PAIN LOCATION - PAIN QUALITY: ACHING
PAIN LOCATION - PAIN DURATION: VARIES
ARTHRALGIAS: 1
PERSON REPORTING PAIN: PATIENT
PAIN: 1
HIGHEST PAIN SEVERITY IN PAST 24 HOURS: 10/10
PAIN LOCATION - PAIN FREQUENCY: INTERMITTENT

## 2024-11-18 ASSESSMENT — ACTIVITIES OF DAILY LIVING (ADL)
AMBULATION ASSISTANCE: STAND BY ASSIST
AMBULATION_DISTANCE/DURATION_TOLERATED: 125'
CURRENT_FUNCTION: STAND BY ASSIST
PHYSICAL TRANSFERS ASSESSED: 1
AMBULATION ASSISTANCE: 1
AMBULATION ASSISTANCE ON FLAT SURFACES: 1

## 2024-11-22 ENCOUNTER — HOME CARE VISIT (OUTPATIENT)
Dept: HOME HEALTH SERVICES | Facility: HOME HEALTH | Age: 79
End: 2024-11-22
Payer: MEDICARE

## 2024-11-22 VITALS
SYSTOLIC BLOOD PRESSURE: 112 MMHG | DIASTOLIC BLOOD PRESSURE: 72 MMHG | HEART RATE: 79 BPM | TEMPERATURE: 98 F | OXYGEN SATURATION: 98 % | RESPIRATION RATE: 18 BRPM

## 2024-11-22 PROCEDURE — G0151 HHCP-SERV OF PT,EA 15 MIN: HCPCS | Mod: HHH

## 2024-11-22 SDOH — HEALTH STABILITY: PHYSICAL HEALTH: EXERCISE TYPE: WRITTEN

## 2024-11-22 SDOH — HEALTH STABILITY: PHYSICAL HEALTH: PHYSICAL EXERCISE: 20

## 2024-11-22 ASSESSMENT — ENCOUNTER SYMPTOMS
PAIN: 1
PAIN LOCATION - PAIN SEVERITY: 7/10
PAIN LOCATION - PAIN FREQUENCY: INTERMITTENT
LOWEST PAIN SEVERITY IN PAST 24 HOURS: 5/10
MUSCLE WEAKNESS: 1
ARTHRALGIAS: 1
SUBJECTIVE PAIN PROGRESSION: WAXING AND WANING
PAIN LOCATION - RELIEVING FACTORS: REST
PAIN LOCATION - PAIN QUALITY: ACHING
PAIN LOCATION - PAIN DURATION: VARIES
PAIN LOCATION - EXACERBATING FACTORS: MVMT
PAIN LOCATION: LEFT ARM
PERSON REPORTING PAIN: PATIENT
HIGHEST PAIN SEVERITY IN PAST 24 HOURS: 10/10

## 2024-11-22 ASSESSMENT — ACTIVITIES OF DAILY LIVING (ADL)
AMBULATION ASSISTANCE: 1
CURRENT_FUNCTION: STAND BY ASSIST
PHYSICAL TRANSFERS ASSESSED: 1
AMBULATION_DISTANCE/DURATION_TOLERATED: 150'
AMBULATION ASSISTANCE ON FLAT SURFACES: 1
AMBULATION ASSISTANCE: STAND BY ASSIST

## 2024-11-25 ENCOUNTER — HOME CARE VISIT (OUTPATIENT)
Dept: HOME HEALTH SERVICES | Facility: HOME HEALTH | Age: 79
End: 2024-11-25
Payer: MEDICARE

## 2024-11-25 VITALS
TEMPERATURE: 97.8 F | RESPIRATION RATE: 18 BRPM | DIASTOLIC BLOOD PRESSURE: 57 MMHG | SYSTOLIC BLOOD PRESSURE: 101 MMHG | OXYGEN SATURATION: 96 % | HEART RATE: 85 BPM

## 2024-11-25 PROCEDURE — G0151 HHCP-SERV OF PT,EA 15 MIN: HCPCS | Mod: HHH

## 2024-11-25 SDOH — HEALTH STABILITY: PHYSICAL HEALTH: PHYSICAL EXERCISE: 22

## 2024-11-25 SDOH — HEALTH STABILITY: PHYSICAL HEALTH: EXERCISE TYPE: WRITTEN

## 2024-11-25 ASSESSMENT — ENCOUNTER SYMPTOMS
LOWEST PAIN SEVERITY IN PAST 24 HOURS: 4/10
PAIN LOCATION - PAIN FREQUENCY: INTERMITTENT
HIGHEST PAIN SEVERITY IN PAST 24 HOURS: 10/10
PAIN LOCATION - RELIEVING FACTORS: REST
ARTHRALGIAS: 1
PAIN LOCATION - PAIN DURATION: VARIES
PAIN LOCATION - PAIN QUALITY: ACHING
SUBJECTIVE PAIN PROGRESSION: GRADUALLY IMPROVING
PAIN LOCATION - EXACERBATING FACTORS: MVMT
PERSON REPORTING PAIN: PATIENT
PAIN SEVERITY GOAL: 3/10
PAIN: 1
PAIN LOCATION: BACK
PAIN LOCATION - PAIN SEVERITY: 6/10
MUSCLE WEAKNESS: 1

## 2024-11-25 ASSESSMENT — ACTIVITIES OF DAILY LIVING (ADL)
AMBULATION ASSISTANCE ON FLAT SURFACES: 1
AMBULATION ASSISTANCE: STAND BY ASSIST
PHYSICAL TRANSFERS ASSESSED: 1
CURRENT_FUNCTION: SUPERVISION
AMBULATION ASSISTANCE: 1
AMBULATION_DISTANCE/DURATION_TOLERATED: 175'

## 2024-11-29 ENCOUNTER — HOME CARE VISIT (OUTPATIENT)
Dept: HOME HEALTH SERVICES | Facility: HOME HEALTH | Age: 79
End: 2024-11-29
Payer: MEDICARE

## 2024-11-29 VITALS
SYSTOLIC BLOOD PRESSURE: 102 MMHG | HEART RATE: 69 BPM | DIASTOLIC BLOOD PRESSURE: 59 MMHG | RESPIRATION RATE: 18 BRPM | TEMPERATURE: 96.9 F | OXYGEN SATURATION: 98 %

## 2024-11-29 PROCEDURE — G0151 HHCP-SERV OF PT,EA 15 MIN: HCPCS | Mod: HHH

## 2024-11-29 SDOH — HEALTH STABILITY: PHYSICAL HEALTH: EXERCISE TYPE: WRITTEN

## 2024-11-29 SDOH — HEALTH STABILITY: PHYSICAL HEALTH: PHYSICAL EXERCISE: 25

## 2024-11-29 ASSESSMENT — ENCOUNTER SYMPTOMS
PAIN LOCATION - EXACERBATING FACTORS: MVMT
PAIN LOCATION - PAIN DURATION: VARIES
PAIN: 1
PAIN LOCATION - PAIN QUALITY: ACHING
PAIN LOCATION - RELIEVING FACTORS: REST
HIGHEST PAIN SEVERITY IN PAST 24 HOURS: 10/10
PAIN LOCATION - PAIN FREQUENCY: INTERMITTENT
ARTHRALGIAS: 1
PAIN SEVERITY GOAL: 3/10
SUBJECTIVE PAIN PROGRESSION: GRADUALLY IMPROVING
PAIN LOCATION - PAIN SEVERITY: 6/10
LOWEST PAIN SEVERITY IN PAST 24 HOURS: 0/10
PAIN LOCATION: BACK
MUSCLE WEAKNESS: 1
PERSON REPORTING PAIN: PATIENT

## 2024-11-29 ASSESSMENT — ACTIVITIES OF DAILY LIVING (ADL)
PHYSICAL TRANSFERS ASSESSED: 1
CURRENT_FUNCTION: SUPERVISION
AMBULATION ASSISTANCE: SUPERVISION
AMBULATION ASSISTANCE: 1
AMBULATION ASSISTANCE ON FLAT SURFACES: 1
AMBULATION_DISTANCE/DURATION_TOLERATED: 200'

## 2024-12-02 ENCOUNTER — HOME CARE VISIT (OUTPATIENT)
Dept: HOME HEALTH SERVICES | Facility: HOME HEALTH | Age: 79
End: 2024-12-02
Payer: MEDICARE

## 2024-12-02 VITALS
DIASTOLIC BLOOD PRESSURE: 77 MMHG | OXYGEN SATURATION: 98 % | TEMPERATURE: 97.4 F | RESPIRATION RATE: 18 BRPM | SYSTOLIC BLOOD PRESSURE: 121 MMHG | HEART RATE: 74 BPM

## 2024-12-02 PROCEDURE — G0151 HHCP-SERV OF PT,EA 15 MIN: HCPCS | Mod: HHH

## 2024-12-02 SDOH — HEALTH STABILITY: PHYSICAL HEALTH: EXERCISE TYPE: WRITTEN

## 2024-12-02 SDOH — HEALTH STABILITY: PHYSICAL HEALTH: PHYSICAL EXERCISE: 27

## 2024-12-02 ASSESSMENT — ENCOUNTER SYMPTOMS
PAIN LOCATION - EXACERBATING FACTORS: MVMT
PAIN SEVERITY GOAL: 3/10
PAIN LOCATION - PAIN FREQUENCY: INTERMITTENT
PERSON REPORTING PAIN: PATIENT
PAIN LOCATION: BACK
PAIN LOCATION - PAIN DURATION: VARIES
SUBJECTIVE PAIN PROGRESSION: GRADUALLY IMPROVING
ARTHRALGIAS: 1
HIGHEST PAIN SEVERITY IN PAST 24 HOURS: 10/10
LOWEST PAIN SEVERITY IN PAST 24 HOURS: 0/10
PAIN LOCATION - RELIEVING FACTORS: REST
PAIN: 1
MUSCLE WEAKNESS: 1
PAIN LOCATION - PAIN QUALITY: ACHING
PAIN LOCATION - PAIN SEVERITY: 6/10

## 2024-12-02 ASSESSMENT — ACTIVITIES OF DAILY LIVING (ADL)
CURRENT_FUNCTION: SUPERVISION
AMBULATION_DISTANCE/DURATION_TOLERATED: 225'
AMBULATION ASSISTANCE ON FLAT SURFACES: 1
PHYSICAL TRANSFERS ASSESSED: 1
AMBULATION ASSISTANCE: 1
AMBULATION ASSISTANCE: SUPERVISION

## 2024-12-06 ENCOUNTER — HOME CARE VISIT (OUTPATIENT)
Dept: HOME HEALTH SERVICES | Facility: HOME HEALTH | Age: 79
End: 2024-12-06
Payer: MEDICARE

## 2024-12-06 VITALS
OXYGEN SATURATION: 99 % | SYSTOLIC BLOOD PRESSURE: 117 MMHG | TEMPERATURE: 97.3 F | HEART RATE: 71 BPM | RESPIRATION RATE: 18 BRPM | DIASTOLIC BLOOD PRESSURE: 72 MMHG

## 2024-12-06 PROCEDURE — G0151 HHCP-SERV OF PT,EA 15 MIN: HCPCS | Mod: HHH

## 2024-12-06 SDOH — HEALTH STABILITY: PHYSICAL HEALTH: EXERCISE TYPE: INDEPENDENT

## 2024-12-06 ASSESSMENT — ACTIVITIES OF DAILY LIVING (ADL)
OASIS_M1830: 02
AMBULATION ASSISTANCE: 1
AMBULATION ASSISTANCE: INDEPENDENT
CURRENT_FUNCTION: INDEPENDENT
PHYSICAL TRANSFERS ASSESSED: 1
HOME_HEALTH_OASIS: 00

## 2024-12-06 ASSESSMENT — GAIT ASSESSMENTS
TRUNK: 1 - NO SWAY BUT FLEXION OF KNEES OR BACK OR SPREADS ARMS WHILE WALKING
GAIT SCORE: 9
PATH: 1 - MILD/MODERATE DEVIATION OR USES WALKING AID
WALKING STANCE: 0 - HEELS APART
PATH SCORE: 1
BALANCE AND GAIT SCORE: 24
STEP CONTINUITY: 1 - STEPS APPEAR CONTINUOUS
INITIATION OF GAIT IMMEDIATELY AFTER GO: 1 - NO HESITANCY
STEP SYMMETRY: 1 - RIGHT AND LEFT STEP LENGTH APPEAR EQUAL
TRUNK SCORE: 1

## 2024-12-06 ASSESSMENT — ENCOUNTER SYMPTOMS
PAIN SEVERITY GOAL: 3/10
PAIN: 1
SUBJECTIVE PAIN PROGRESSION: WAXING AND WANING
LOWEST PAIN SEVERITY IN PAST 24 HOURS: 3/10
PERSON REPORTING PAIN: PATIENT
PAIN LOCATION: GENERALIZED
HIGHEST PAIN SEVERITY IN PAST 24 HOURS: 7/10

## 2024-12-06 ASSESSMENT — BALANCE ASSESSMENTS
NUDGED SCORE: 2
TURNING 360 DEGREES STEPS: 1 - CONTINUOUS STEPS
SITTING DOWN: 1 - USES ARMS OR NOT SMOOTH MOTION
EYES CLOSED AT MAXIMUM POSITION NUDGED: 1 - STEADY
ARISES: 2 - ABLE WITHOUT USING ARMS
STANDING BALANCE: 2 - NARROW STANCE WITHOUT SUPPORT
NUDGED: 2 - STEADY
IMMEDIATE STANDING BALANCE FIRST 5 SECONDS: 2 - STEADY WITHOUT WALKER OR OTHER SUPPORT
ARISING SCORE: 2
ATTEMPTS TO ARISE: 2 - ABLE TO RISE, ONE ATTEMPT
SITTING BALANCE: 1 - STEADY, SAFE
BALANCE SCORE: 15

## 2024-12-09 ENCOUNTER — LAB (OUTPATIENT)
Dept: LAB | Facility: LAB | Age: 79
End: 2024-12-09
Payer: MEDICARE

## 2024-12-09 DIAGNOSIS — N18.30 CHRONIC KIDNEY DISEASE, STAGE 3 UNSPECIFIED (MULTI): Primary | ICD-10-CM

## 2024-12-09 DIAGNOSIS — E78.2 MIXED HYPERLIPIDEMIA: ICD-10-CM

## 2024-12-09 DIAGNOSIS — R79.89 ELEVATED FERRITIN: ICD-10-CM

## 2024-12-09 LAB
ALBUMIN SERPL BCP-MCNC: 4 G/DL (ref 3.4–5)
ANION GAP SERPL CALCULATED.3IONS-SCNC: 12 MMOL/L (ref 10–20)
BUN SERPL-MCNC: 29 MG/DL (ref 6–23)
CALCIUM SERPL-MCNC: 9.2 MG/DL (ref 8.6–10.3)
CHLORIDE SERPL-SCNC: 108 MMOL/L (ref 98–107)
CHOLEST SERPL-MCNC: 117 MG/DL (ref 0–199)
CHOLEST/HDLC SERPL: 3.6 {RATIO}
CO2 SERPL-SCNC: 26 MMOL/L (ref 21–32)
CREAT SERPL-MCNC: 1.2 MG/DL (ref 0.5–1.05)
EGFRCR SERPLBLD CKD-EPI 2021: 46 ML/MIN/1.73M*2
ERYTHROCYTE [DISTWIDTH] IN BLOOD BY AUTOMATED COUNT: 14.4 % (ref 11.5–14.5)
FERRITIN SERPL-MCNC: 91 NG/ML (ref 8–150)
GLUCOSE SERPL-MCNC: 119 MG/DL (ref 74–99)
HCT VFR BLD AUTO: 35.6 % (ref 36–46)
HDLC SERPL-MCNC: 32.1 MG/DL
HGB BLD-MCNC: 10.8 G/DL (ref 12–16)
LDLC SERPL CALC-MCNC: 53 MG/DL
MCH RBC QN AUTO: 29.8 PG (ref 26–34)
MCHC RBC AUTO-ENTMCNC: 30.3 G/DL (ref 32–36)
MCV RBC AUTO: 98 FL (ref 80–100)
NON HDL CHOLESTEROL: 85 MG/DL (ref 0–149)
NRBC BLD-RTO: 0 /100 WBCS (ref 0–0)
PHOSPHATE SERPL-MCNC: 3.2 MG/DL (ref 2.5–4.9)
PLATELET # BLD AUTO: 152 X10*3/UL (ref 150–450)
POTASSIUM SERPL-SCNC: 5 MMOL/L (ref 3.5–5.3)
PTH-INTACT SERPL-MCNC: 36.4 PG/ML (ref 18.5–88)
RBC # BLD AUTO: 3.62 X10*6/UL (ref 4–5.2)
SODIUM SERPL-SCNC: 141 MMOL/L (ref 136–145)
TRIGL SERPL-MCNC: 160 MG/DL (ref 0–149)
VLDL: 32 MG/DL (ref 0–40)
WBC # BLD AUTO: 6.3 X10*3/UL (ref 4.4–11.3)

## 2024-12-09 PROCEDURE — 36415 COLL VENOUS BLD VENIPUNCTURE: CPT

## 2024-12-09 PROCEDURE — 80069 RENAL FUNCTION PANEL: CPT

## 2024-12-09 PROCEDURE — 83970 ASSAY OF PARATHORMONE: CPT

## 2024-12-09 PROCEDURE — 85027 COMPLETE CBC AUTOMATED: CPT

## 2024-12-09 PROCEDURE — 80061 LIPID PANEL: CPT

## 2024-12-09 PROCEDURE — 82728 ASSAY OF FERRITIN: CPT

## 2024-12-09 NOTE — LETTER
December 10, 2024     Adriana Duran  [address] [address]      Dear Ms. Duran:    Below are the results from your recent visit:  Labs look good     Resulted Orders   Lipid Panel   Result Value Ref Range    Cholesterol 117 0 - 199 mg/dL      Comment:            Age      Desirable   Borderline High   High     0-19 Y     0 - 169       170 - 199     >/= 200    20-24 Y     0 - 189       190 - 224     >/= 225         >24 Y     0 - 199       200 - 239     >/= 240   **All ranges are based on fasting samples. Specific   therapeutic targets will vary based on patient-specific   cardiac risk.    Pediatric guidelines reference:Pediatrics 2011, 128(S5).Adult guidelines reference: NCEP ATPIII Guidelines,LUKASZ 2001, 258:2486-97    Venipuncture immediately after or during the administration of Metamizole may lead to falsely low results. Testing should be performed immediately prior to Metamizole dosing.    HDL-Cholesterol 32.1 mg/dL      Comment:        Age       Very Low   Low     Normal    High    0-19 Y    < 35      < 40     40-45     ----  20-24 Y    ----     < 40      >45      ----        >24 Y      ----     < 40     40-60      >60      Cholesterol/HDL Ratio 3.6       Comment:        Ref Values  Desirable  < 3.4  High Risk  > 5.0    LDL Calculated 53 <=99 mg/dL      Comment:                                  Near   Borderline      AGE      Desirable  Optimal    High     High     Very High     0-19 Y     0 - 109     ---    110-129   >/= 130     ----    20-24 Y     0 - 119     ---    120-159   >/= 160     ----      >24 Y     0 -  99   100-129  130-159   160-189     >/=190      VLDL 32 0 - 40 mg/dL    Triglycerides 160 (H) 0 - 149 mg/dL      Comment:      Age              Desirable        Borderline         High        Very High  SEX:B           mg/dL             mg/dL               mg/dL      mg/dL  <=14D                       ----               ----        ----  15D-365D                    ----                ----        ----  1Y-9Y           0-74               75-99             >=100       ----  10Y-19Y        0-89                            >=130       ----  20Y-24Y        0-114             115-149             >=150      ----  >= 25Y         0-149             150-199             200-499    >=500      Venipuncture immediately after or during the administration of Metamizole may lead to falsely low results. Testing should be performed immediately prior to Metamizole dosing.    Non HDL Cholesterol 85 0 - 149 mg/dL      Comment:            Age       Desirable   Borderline High   High     Very High     0-19 Y     0 - 119       120 - 144     >/= 145    >/= 160    20-24 Y     0 - 149       150 - 189     >/= 190      ----         >24 Y    30 mg/dL above LDL Cholesterol goal     Ferritin   Result Value Ref Range    Ferritin 91 8 - 150 ng/mL     The test results show that your current treatment is working.   If you have any questions or concerns, please don't hesitate to call.         Sincerely,  Tawanda Joel PA-C

## 2024-12-12 ENCOUNTER — APPOINTMENT (OUTPATIENT)
Dept: UROLOGY | Facility: CLINIC | Age: 79
End: 2024-12-12
Payer: MEDICARE

## 2024-12-18 DIAGNOSIS — R41.82 ALTERED MENTAL STATUS, UNSPECIFIED ALTERED MENTAL STATUS TYPE: ICD-10-CM

## 2024-12-18 RX ORDER — DONEPEZIL HYDROCHLORIDE 5 MG/1
5 TABLET, FILM COATED ORAL NIGHTLY
Qty: 30 TABLET | Refills: 1 | Status: SHIPPED | OUTPATIENT
Start: 2024-12-18 | End: 2025-02-16

## 2024-12-30 DIAGNOSIS — R41.82 ALTERED MENTAL STATUS, UNSPECIFIED ALTERED MENTAL STATUS TYPE: ICD-10-CM

## 2024-12-30 RX ORDER — DONEPEZIL HYDROCHLORIDE 5 MG/1
5 TABLET, FILM COATED ORAL NIGHTLY
Qty: 30 TABLET | Refills: 1 | Status: SHIPPED | OUTPATIENT
Start: 2024-12-30 | End: 2025-02-28

## 2025-01-23 ENCOUNTER — APPOINTMENT (OUTPATIENT)
Dept: UROLOGY | Facility: CLINIC | Age: 80
End: 2025-01-23
Payer: MEDICARE

## 2025-01-24 DIAGNOSIS — R39.15 URINARY URGENCY: ICD-10-CM

## 2025-01-24 RX ORDER — SOLIFENACIN SUCCINATE 10 MG/1
10 TABLET, FILM COATED ORAL DAILY
Qty: 30 TABLET | Refills: 11 | Status: SHIPPED | OUTPATIENT
Start: 2025-01-24 | End: 2026-01-19

## 2025-02-22 DIAGNOSIS — E78.2 MIXED HYPERLIPIDEMIA: ICD-10-CM

## 2025-02-24 RX ORDER — ATORVASTATIN CALCIUM 20 MG/1
20 TABLET, FILM COATED ORAL DAILY
Qty: 90 TABLET | Refills: 3 | Status: SHIPPED | OUTPATIENT
Start: 2025-02-24

## 2025-03-03 DIAGNOSIS — R41.82 ALTERED MENTAL STATUS, UNSPECIFIED ALTERED MENTAL STATUS TYPE: ICD-10-CM

## 2025-03-03 RX ORDER — DONEPEZIL HYDROCHLORIDE 5 MG/1
5 TABLET, FILM COATED ORAL NIGHTLY
Qty: 30 TABLET | Refills: 3 | Status: SHIPPED | OUTPATIENT
Start: 2025-03-03 | End: 2025-07-01

## 2025-03-05 NOTE — PROGRESS NOTES
Patient is a 79 y.o. female presenting with urinary urgency.    SUBJECTIVE:  HPI   She presents for follow up.  Her partner is in attendance.   She was unable to give a urine sample today.  She is experiencing urinary urgency with incontinence.   She is using timed voids, and is not consuming any caffeine.  She is a history of urgency.  She has tried Myrbetriq and Solifenacin previously.  She has some back pain in the left kidney region.    She has CKD and is followed by Dr. JOSEPHINE Levy of Nephrology    Past Medical History:   Diagnosis Date    Arthritis     Chronic kidney disease     Diabetes mellitus (Multi)     Foot pain, left     bunion turned to wound    Hypertension     Right knee pain     UTI (urinary tract infection)      Past Surgical History:   Procedure Laterality Date    HIP FRACTURE SURGERY Left     KNEE ARTHROPLASTY      KNEE SURGERY Left     replacement    KNEE SURGERY Right     arthritis removed    SHOULDER SURGERY Left     rotator cuff    TOTAL SHOULDER ARTHROPLASTY        Family History   Problem Relation Name Age of Onset    Heart disease Father      Cancer Father      Diabetes Paternal Grandfather        Tobacco Use: Medium Risk (3/6/2025)    Patient History     Smoking Tobacco Use: Former     Smokeless Tobacco Use: Never     Passive Exposure: Not on file       Review of Systems       OBJECTIVE:  Visit Vitals  Temp 36.1 °C (96.9 °F)     Physical Exam   Constitutional: No obvious distress.  Eyes: Non-injected conjunctiva, sclera clear, EOMI.  Ears/Nose/Mouth/Throat: No obvious drainage per ears or nose.  Cardiovascular: Extremities are warm and well perfused. No edema, cyanosis or pallor.  Respiratory: No audible wheezing/stridor; respirations do not appear labored.  Gastrointestinal: Abdomen soft, not distended.  Musculoskeletal: Normal ROM of extremities.  Skin: No obvious rashes or open sores.  Neurologic: Alert and oriented, CN 2-12 grossly intact.  Psychiatric: Answers questions appropriately  with normal affect.  Hematologic/Lymphatic/Immunologic: No obvious bruises or sites of spontaneous bleeding.  Genitourinary: No CVA tenderness, bladder not palpable.     LABS:  Lab Results   Component Value Date    GLUCOSE 119 (H) 12/09/2024    CALCIUM 9.2 12/09/2024     12/09/2024    K 5.0 12/09/2024    CO2 26 12/09/2024     (H) 12/09/2024    BUN 29 (H) 12/09/2024    CREATININE 1.20 (H) 12/09/2024     Parathyroid Hormone, Intact (pg/mL)   Date Value   12/09/2024 36.4     Urine Culture (no units)   Date Value   09/22/2024 No significant growth      IMAGING:      PROCEDURES:  PVR 67 mL    3/6/25    ASSESSMENT/PLAN:  Problem List Items Addressed This Visit       Urinary urgency    Relevant Orders    Measure post void residual (Completed)      She has a history of urinary urgency and urge incontinence. She has tried Myrbetriq and solifenacin without success. Her PVR was 67 mL today (3/6/25).  She will stop solifenacin and start Gemtesa for 1 month. Adverse effects and dosage reviewed. She will follow up in 1 month for possible cystoscopy. She is considering Botox.     She has had some left back pain. Renal ultrasound will be ordered.     She was unable to give a urine sample today. She was given a cup to take home.    All questions were answered to the patient’s satisfaction.  Patient agrees with the plan and wishes to proceed.  Follow-up will be scheduled appropriately.     Scribe Attestation  By signing my name below, I, Charity Zepeda,   attest that this documentation has been prepared under the direction and in the presence of Rafael Ruiz MD.

## 2025-03-06 ENCOUNTER — APPOINTMENT (OUTPATIENT)
Dept: UROLOGY | Facility: CLINIC | Age: 80
End: 2025-03-06
Payer: MEDICARE

## 2025-03-06 VITALS — TEMPERATURE: 96.9 F

## 2025-03-06 DIAGNOSIS — N39.41 URGE INCONTINENCE OF URINE: Primary | ICD-10-CM

## 2025-03-06 DIAGNOSIS — M54.50 ACUTE LOW BACK PAIN WITHOUT SCIATICA, UNSPECIFIED BACK PAIN LATERALITY: ICD-10-CM

## 2025-03-06 DIAGNOSIS — R39.15 URINARY URGENCY: ICD-10-CM

## 2025-03-06 PROBLEM — M54.9 BACK PAIN: Status: ACTIVE | Noted: 2025-03-06

## 2025-03-06 PROCEDURE — 99214 OFFICE O/P EST MOD 30 MIN: CPT | Performed by: UROLOGY

## 2025-03-06 PROCEDURE — 1159F MED LIST DOCD IN RCRD: CPT | Performed by: UROLOGY

## 2025-03-06 PROCEDURE — 1157F ADVNC CARE PLAN IN RCRD: CPT | Performed by: UROLOGY

## 2025-03-06 PROCEDURE — 1036F TOBACCO NON-USER: CPT | Performed by: UROLOGY

## 2025-03-06 PROCEDURE — 1126F AMNT PAIN NOTED NONE PRSNT: CPT | Performed by: UROLOGY

## 2025-03-06 PROCEDURE — G2211 COMPLEX E/M VISIT ADD ON: HCPCS | Performed by: UROLOGY

## 2025-03-06 ASSESSMENT — PAIN SCALES - GENERAL: PAINLEVEL_OUTOF10: 0-NO PAIN

## 2025-03-10 ENCOUNTER — HOSPITAL ENCOUNTER (OUTPATIENT)
Dept: RADIOLOGY | Facility: HOSPITAL | Age: 80
Discharge: HOME | End: 2025-03-10
Payer: MEDICARE

## 2025-03-10 DIAGNOSIS — M54.50 ACUTE LOW BACK PAIN WITHOUT SCIATICA, UNSPECIFIED BACK PAIN LATERALITY: ICD-10-CM

## 2025-03-10 PROCEDURE — 76770 US EXAM ABDO BACK WALL COMP: CPT | Performed by: RADIOLOGY

## 2025-03-10 PROCEDURE — 76770 US EXAM ABDO BACK WALL COMP: CPT

## 2025-03-17 DIAGNOSIS — E11.21 DIABETIC NEPHROPATHY WITH PROTEINURIA (MULTI): ICD-10-CM

## 2025-03-17 DIAGNOSIS — F41.9 ANXIETY: ICD-10-CM

## 2025-03-17 RX ORDER — BUSPIRONE HYDROCHLORIDE 10 MG/1
10 TABLET ORAL 3 TIMES DAILY
Qty: 270 TABLET | Refills: 3 | Status: SHIPPED | OUTPATIENT
Start: 2025-03-17

## 2025-03-17 RX ORDER — DULOXETIN HYDROCHLORIDE 60 MG/1
60 CAPSULE, DELAYED RELEASE ORAL DAILY
Qty: 90 CAPSULE | Refills: 3 | Status: SHIPPED | OUTPATIENT
Start: 2025-03-17

## 2025-03-20 DIAGNOSIS — R41.82 ALTERED MENTAL STATUS, UNSPECIFIED ALTERED MENTAL STATUS TYPE: ICD-10-CM

## 2025-03-20 DIAGNOSIS — I10 HTN (HYPERTENSION), BENIGN: ICD-10-CM

## 2025-03-20 RX ORDER — DONEPEZIL HYDROCHLORIDE 5 MG/1
5 TABLET, FILM COATED ORAL NIGHTLY
Qty: 90 TABLET | Refills: 1 | Status: SHIPPED | OUTPATIENT
Start: 2025-03-20 | End: 2025-03-27 | Stop reason: SDUPTHER

## 2025-03-20 RX ORDER — LOSARTAN POTASSIUM 100 MG/1
100 TABLET ORAL DAILY
Qty: 90 TABLET | Refills: 1 | Status: SHIPPED | OUTPATIENT
Start: 2025-03-20

## 2025-03-20 RX ORDER — OLANZAPINE 2.5 MG/1
2.5 TABLET ORAL NIGHTLY
Qty: 90 TABLET | Refills: 1 | Status: SHIPPED | OUTPATIENT
Start: 2025-03-20 | End: 2025-03-27 | Stop reason: SDUPTHER

## 2025-03-27 DIAGNOSIS — R41.82 ALTERED MENTAL STATUS, UNSPECIFIED ALTERED MENTAL STATUS TYPE: ICD-10-CM

## 2025-03-27 RX ORDER — DONEPEZIL HYDROCHLORIDE 5 MG/1
5 TABLET, FILM COATED ORAL NIGHTLY
Qty: 90 TABLET | Refills: 1 | Status: SHIPPED | OUTPATIENT
Start: 2025-03-27 | End: 2025-09-23

## 2025-03-27 RX ORDER — OLANZAPINE 2.5 MG/1
2.5 TABLET ORAL NIGHTLY
Qty: 90 TABLET | Refills: 1 | Status: SHIPPED | OUTPATIENT
Start: 2025-03-27 | End: 2025-09-23

## 2025-04-16 NOTE — PROGRESS NOTES
Patient is a 79 y.o. female presenting for cystoscopy and 1-month followup with [facility] of urinary urgency and incontinence.    SUBJECTIVE:  HPI   She presents for follow up after starting Gemtesa on 3/6/25 for urinary urgency.  Her spouse is in attendance.  She has minimal improvement with Gemtesa.   She is most bothered by urinary urgency with leakage. She is using depends.     She had been experiencing urinary urgency with incontinence.   She is using timed voids, and is not consuming any caffeine.  She has tried Myrbetriq and Solifenacin previously.  She previously has some back pain in the left kidney region.    She has CKD and is followed by Dr. JOSEPHINE Levy of Nephrology    Past Medical History:   Diagnosis Date    Arthritis     Chronic kidney disease     Diabetes mellitus (Multi)     Foot pain, left     bunion turned to wound    Hypertension     Right knee pain     UTI (urinary tract infection)      Past Surgical History:   Procedure Laterality Date    HIP FRACTURE SURGERY Left     KNEE ARTHROPLASTY      KNEE SURGERY Left     replacement    KNEE SURGERY Right     arthritis removed    SHOULDER SURGERY Left     rotator cuff    TOTAL SHOULDER ARTHROPLASTY        Family History   Problem Relation Name Age of Onset    Heart disease Father      Cancer Father      Diabetes Paternal Grandfather        Tobacco Use: Medium Risk (4/17/2025)    Patient History     Smoking Tobacco Use: Former     Smokeless Tobacco Use: Never     Passive Exposure: Not on file     Review of Systems     OBJECTIVE:  Visit Vitals  BP 93/58   Pulse 80   Temp 36 °C (96.8 °F)     Physical Exam   Constitutional: No obvious distress.  Eyes: Non-injected conjunctiva, sclera clear, EOMI.  Ears/Nose/Mouth/Throat: No obvious drainage per ears or nose.  Cardiovascular: Extremities are warm and well perfused. No edema, cyanosis or pallor.  Respiratory: No audible wheezing/stridor; respirations do not appear labored.  Gastrointestinal: Abdomen soft, not  distended.  Musculoskeletal: Normal ROM of extremities.  Skin: No obvious rashes or open sores.  Neurologic: Alert and oriented, CN 2-12 grossly intact.  Psychiatric: Answers questions appropriately with normal affect.  Hematologic/Lymphatic/Immunologic: No obvious bruises or sites of spontaneous bleeding.  Genitourinary: No CVA tenderness, bladder not palpable.     LABS:  Lab Results   Component Value Date    WBC 6.3 12/09/2024    HGB 10.8 (L) 12/09/2024    HCT 35.6 (L) 12/09/2024    MCV 98 12/09/2024     12/09/2024    GLUCOSE 119 (H) 12/09/2024    CALCIUM 9.2 12/09/2024     12/09/2024    K 5.0 12/09/2024    CO2 26 12/09/2024     (H) 12/09/2024    BUN 29 (H) 12/09/2024    CREATININE 1.20 (H) 12/09/2024    EGFR 46 (L) 12/09/2024    PTH 36.4 12/09/2024    URINECULTURE No significant growth 09/22/2024     IMAGING:  === 03/10/25 ===  US RENAL COMPLETE  - Impression -  Borderline small kidneys bilaterally without hydronephrosis.    PROCEDURES:  Cystoscopy    Date/Time: 4/17/2025 12:15 PM    Performed by: Rafael Ruiz MD  Authorized by: Rafael Ruiz MD    Procedure - Bladder Cystoscopy:     Procedure details: cystoscopy    Post-procedure:     Patient tolerance: Patient tolerated the procedure well with no immediate complications      Comments:      Risks including infection and bleeding were reviewed.  Urethra: normal course and caliber, no evidence of stricture or lesion.  Bladder: normal capacity, no trabeculations, no diverticulum, no stone, tumors or other lesions        ASSESSMENT/PLAN:  Problem List Items Addressed This Visit       Urinary incontinence    Urinary urgency - Primary     She has a history of urinary urgency and urge incontinence. She has tried Myrbetriq and solifenacin. She had minimal improvement with Gemtesa. Cystoscopy was negative today (4/17/25). Keflex given for prophylaxis. She has elected to continue Gemtesa at this time and a prescription was sent. She will  consider Botox.     I feel she would be a good candidate for Botox if she is bothered by her symptoms.   We discussed the procedure in detail. We discussed the risk of Botox therapy including difficulty emptying the bladder.     She has CKD.  Creatinine 1.2 in 12/24.  Track creatinine    She had retention of 67 mL 3/6/25.     She has had some left back pain. Renal ultrasound on 3/10/25 was negative for stones or hydronephrosis. Bilateral kidneys were borderline small.    All questions were answered to the patient’s satisfaction.  Patient agrees with the plan and wishes to proceed.  Follow-up will be scheduled appropriately.     Scribe Attestation  By signing my name below, I, Charity Zepeda,   attest that this documentation has been prepared under the direction and in the presence of Rafael Ruiz MD.

## 2025-04-17 ENCOUNTER — APPOINTMENT (OUTPATIENT)
Dept: UROLOGY | Facility: CLINIC | Age: 80
End: 2025-04-17
Payer: MEDICARE

## 2025-04-17 VITALS
TEMPERATURE: 96.8 F | DIASTOLIC BLOOD PRESSURE: 58 MMHG | HEART RATE: 80 BPM | HEIGHT: 64 IN | BODY MASS INDEX: 29.53 KG/M2 | SYSTOLIC BLOOD PRESSURE: 93 MMHG | WEIGHT: 173 LBS

## 2025-04-17 DIAGNOSIS — N39.41 URGE INCONTINENCE: ICD-10-CM

## 2025-04-17 DIAGNOSIS — N18.9 CHRONIC KIDNEY DISEASE, UNSPECIFIED CKD STAGE: ICD-10-CM

## 2025-04-17 DIAGNOSIS — Z79.2 PROPHYLACTIC ANTIBIOTIC: ICD-10-CM

## 2025-04-17 DIAGNOSIS — R39.15 URINARY URGENCY: Primary | ICD-10-CM

## 2025-04-17 PROCEDURE — 52000 CYSTOURETHROSCOPY: CPT | Performed by: UROLOGY

## 2025-04-17 PROCEDURE — 99214 OFFICE O/P EST MOD 30 MIN: CPT | Performed by: UROLOGY

## 2025-04-17 RX ORDER — CEPHALEXIN 500 MG/1
500 CAPSULE ORAL ONCE
Status: COMPLETED | OUTPATIENT
Start: 2025-04-17 | End: 2025-04-17

## 2025-04-17 RX ADMIN — CEPHALEXIN 500 MG: 500 CAPSULE ORAL at 17:00

## 2025-04-17 ASSESSMENT — PAIN SCALES - GENERAL: PAINLEVEL_OUTOF10: 0-NO PAIN

## 2025-04-25 DIAGNOSIS — J01.40 ACUTE NON-RECURRENT PANSINUSITIS: ICD-10-CM

## 2025-04-25 RX ORDER — CETIRIZINE HYDROCHLORIDE 10 MG/1
10 TABLET ORAL DAILY
Qty: 90 TABLET | Refills: 1 | Status: SHIPPED | OUTPATIENT
Start: 2025-04-25 | End: 2025-10-22

## 2025-04-29 NOTE — PROGRESS NOTES
Subjective   Patient ID:   Adriana Duran is a 79 y.o. female who presents for No chief complaint on file..  [facility]  Hospital follow up:  Admitted from 9/23-9/25.  Discharge note reviewed.  Was having altered mental status.  CT showed severe sinusitis - was given IV antibiotics which transitioned to oral antibiotics.  Was discharged to a SNF.  Feeling better!  I referred to home health PT/OT last visit.    Memory concerns:  We started Donepezil last visit.    Urinary urgency:  Seeing urology.  Had a cystoscopy in April 2025.    Diabetes:  Not on medication for this.  Last A1C was 5.3 in Sep 2024.  POC today is    HTN:  Taking Losartan.  BP today is  Denies dizziness, headaches.    HLD:  Taking Atorvastatin.  Last checked Dec 2024.    Anxiety/Depression:  Taking Buspar and Celexa.  Denies SI/HI.    OA/Femur fracture:  Seeing ortho.  Occurred in Dec 2023.  Doing well!  Walking is going ok.    Elevated ferritin:  This was improved to normal in Dec 2024.    Left great toe wound/Osteomyelitis:  Seeing podiatry and wound care.  Hospitalized for this multiple times over the last 6 months.  Doing very well!    Health maintenance:  Smoking: Former smoker.  Labs: Dec 2024.  Influenza:    Review of Systems  12 point review of systems negative unless stated above in HPI    There were no vitals filed for this visit.    Physical Exam  General: Alert and oriented, well nourished, no acute distress.  Lungs: Clear to auscultation, non-labored respiration.  Heart: Normal rate, regular rhythm, no murmur, gallop or edema.  Neurologic: Awake, alert, and oriented X3, CN II-XII intact.  Psychiatric: Cooperative, appropriate mood and affect.    Assessment/Plan   Diagnoses and all orders for this visit:  Medicare annual wellness visit, subsequent  Depression screening  Altered mental status, unspecified altered mental status type  Controlled type 2 diabetes mellitus with complication, without long-term current use of insulin (Multi)  -      POCT glycosylated hemoglobin (Hb A1C) manually resulted  Diabetic nephropathy with proteinuria (Multi)  HTN (hypertension), benign  Mixed hyperlipidemia  Elevated ferritin  Recurrent major depressive disorder, in partial remission (CMS-HCC)  Anxiety  Urinary urgency

## 2025-05-08 ENCOUNTER — OFFICE VISIT (OUTPATIENT)
Dept: PRIMARY CARE | Facility: CLINIC | Age: 80
End: 2025-05-08
Payer: MEDICARE

## 2025-05-08 VITALS
BODY MASS INDEX: 28.51 KG/M2 | TEMPERATURE: 97.5 F | SYSTOLIC BLOOD PRESSURE: 116 MMHG | WEIGHT: 167 LBS | OXYGEN SATURATION: 98 % | DIASTOLIC BLOOD PRESSURE: 68 MMHG | HEART RATE: 74 BPM | HEIGHT: 64 IN

## 2025-05-08 DIAGNOSIS — R39.15 URINARY URGENCY: ICD-10-CM

## 2025-05-08 DIAGNOSIS — Z00.00 ROUTINE GENERAL MEDICAL EXAMINATION AT HEALTH CARE FACILITY: ICD-10-CM

## 2025-05-08 DIAGNOSIS — R79.89 ELEVATED FERRITIN: ICD-10-CM

## 2025-05-08 DIAGNOSIS — E11.21 DIABETIC NEPHROPATHY WITH PROTEINURIA (MULTI): ICD-10-CM

## 2025-05-08 DIAGNOSIS — F41.9 ANXIETY: ICD-10-CM

## 2025-05-08 DIAGNOSIS — F33.41 RECURRENT MAJOR DEPRESSIVE DISORDER, IN PARTIAL REMISSION (CMS-HCC): ICD-10-CM

## 2025-05-08 DIAGNOSIS — Z13.31 DEPRESSION SCREENING: ICD-10-CM

## 2025-05-08 DIAGNOSIS — E11.8 CONTROLLED TYPE 2 DIABETES MELLITUS WITH COMPLICATION, WITHOUT LONG-TERM CURRENT USE OF INSULIN (MULTI): ICD-10-CM

## 2025-05-08 DIAGNOSIS — E78.2 MIXED HYPERLIPIDEMIA: ICD-10-CM

## 2025-05-08 DIAGNOSIS — Z00.00 MEDICARE ANNUAL WELLNESS VISIT, SUBSEQUENT: Primary | ICD-10-CM

## 2025-05-08 DIAGNOSIS — I10 HTN (HYPERTENSION), BENIGN: ICD-10-CM

## 2025-05-08 DIAGNOSIS — R41.82 ALTERED MENTAL STATUS, UNSPECIFIED ALTERED MENTAL STATUS TYPE: ICD-10-CM

## 2025-05-08 PROBLEM — G30.9: Status: RESOLVED | Noted: 2024-09-23 | Resolved: 2025-05-08

## 2025-05-08 PROBLEM — L97.509 ULCER OF GREAT TOE (MULTI): Status: RESOLVED | Noted: 2024-02-29 | Resolved: 2025-05-08

## 2025-05-08 PROBLEM — L97.524 NON-PRESSURE CHRONIC ULCER OF OTHER PART OF LEFT FOOT WITH NECROSIS OF BONE: Status: RESOLVED | Noted: 2024-02-29 | Resolved: 2025-05-08

## 2025-05-08 PROBLEM — F02.818: Status: RESOLVED | Noted: 2024-09-23 | Resolved: 2025-05-08

## 2025-05-08 PROBLEM — F03.90 DEMENTIA: Status: RESOLVED | Noted: 2024-09-23 | Resolved: 2025-05-08

## 2025-05-08 LAB — POC HEMOGLOBIN A1C: 4.9 % (ref 4.2–6.5)

## 2025-05-08 PROCEDURE — 83036 HEMOGLOBIN GLYCOSYLATED A1C: CPT | Mod: QW | Performed by: PHYSICIAN ASSISTANT

## 2025-05-08 PROCEDURE — 1160F RVW MEDS BY RX/DR IN RCRD: CPT | Performed by: PHYSICIAN ASSISTANT

## 2025-05-08 PROCEDURE — 99213 OFFICE O/P EST LOW 20 MIN: CPT | Mod: 25 | Performed by: PHYSICIAN ASSISTANT

## 2025-05-08 PROCEDURE — 99397 PER PM REEVAL EST PAT 65+ YR: CPT | Performed by: PHYSICIAN ASSISTANT

## 2025-05-08 PROCEDURE — G0439 PPPS, SUBSEQ VISIT: HCPCS | Performed by: PHYSICIAN ASSISTANT

## 2025-05-08 PROCEDURE — 1036F TOBACCO NON-USER: CPT | Performed by: PHYSICIAN ASSISTANT

## 2025-05-08 PROCEDURE — G0444 DEPRESSION SCREEN ANNUAL: HCPCS | Performed by: PHYSICIAN ASSISTANT

## 2025-05-08 PROCEDURE — 99215 OFFICE O/P EST HI 40 MIN: CPT | Performed by: PHYSICIAN ASSISTANT

## 2025-05-08 PROCEDURE — 99213 OFFICE O/P EST LOW 20 MIN: CPT | Performed by: PHYSICIAN ASSISTANT

## 2025-05-08 PROCEDURE — 3078F DIAST BP <80 MM HG: CPT | Performed by: PHYSICIAN ASSISTANT

## 2025-05-08 PROCEDURE — G2211 COMPLEX E/M VISIT ADD ON: HCPCS | Performed by: PHYSICIAN ASSISTANT

## 2025-05-08 PROCEDURE — 1170F FXNL STATUS ASSESSED: CPT | Performed by: PHYSICIAN ASSISTANT

## 2025-05-08 PROCEDURE — 1159F MED LIST DOCD IN RCRD: CPT | Performed by: PHYSICIAN ASSISTANT

## 2025-05-08 PROCEDURE — 1126F AMNT PAIN NOTED NONE PRSNT: CPT | Performed by: PHYSICIAN ASSISTANT

## 2025-05-08 PROCEDURE — 3074F SYST BP LT 130 MM HG: CPT | Performed by: PHYSICIAN ASSISTANT

## 2025-05-08 RX ORDER — DULOXETIN HYDROCHLORIDE 60 MG/1
60 CAPSULE, DELAYED RELEASE ORAL DAILY
Qty: 90 CAPSULE | Refills: 3 | Status: SHIPPED | OUTPATIENT
Start: 2025-05-08

## 2025-05-08 ASSESSMENT — ENCOUNTER SYMPTOMS
OCCASIONAL FEELINGS OF UNSTEADINESS: 0
DEPRESSION: 0
LOSS OF SENSATION IN FEET: 0

## 2025-05-08 ASSESSMENT — PAIN SCALES - GENERAL: PAINLEVEL_OUTOF10: 0-NO PAIN

## 2025-05-08 ASSESSMENT — ACTIVITIES OF DAILY LIVING (ADL)
BATHING: NEEDS ASSISTANCE
DOING_HOUSEWORK: NEEDS ASSISTANCE
DRESSING: INDEPENDENT
GROCERY_SHOPPING: NEEDS ASSISTANCE
MANAGING_FINANCES: TOTAL CARE
TAKING_MEDICATION: NEEDS ASSISTANCE

## 2025-05-08 ASSESSMENT — COLUMBIA-SUICIDE SEVERITY RATING SCALE - C-SSRS
6. HAVE YOU EVER DONE ANYTHING, STARTED TO DO ANYTHING, OR PREPARED TO DO ANYTHING TO END YOUR LIFE?: NO
2. HAVE YOU ACTUALLY HAD ANY THOUGHTS OF KILLING YOURSELF?: NO
1. IN THE PAST MONTH, HAVE YOU WISHED YOU WERE DEAD OR WISHED YOU COULD GO TO SLEEP AND NOT WAKE UP?: NO

## 2025-05-08 NOTE — ASSESSMENT & PLAN NOTE
Orders:    DULoxetine (Cymbalta) 60 mg DR capsule; Take 1 capsule (60 mg) by mouth once daily. Do not crush or chew.

## 2025-05-08 NOTE — PROGRESS NOTES
"Subjective   Reason for Visit: Adriana Duran is an 80 y.o. female here for a Medicare Wellness visit.     Past Medical, Surgical, and Family History reviewed and updated in chart.    Reviewed all medications by prescribing practitioner or clinical pharmacist (such as prescriptions, OTCs, herbal therapies and supplements) and documented in the medical record.    [facility]  Patient Care Team:  Tawanda Joel PA-C as PCP - General (Internal Medicine)     Hospital follow up:  Admitted from 9/23-9/25.  Discharge note reviewed.  Was having altered mental status.  CT showed severe sinusitis - was given IV antibiotics which transitioned to oral antibiotics.  Was discharged to a SNF.  Feeling better!  I referred to home health PT/OT last visit.    Memory concerns:  We started Donepezil last visit.    Urinary urgency:  Seeing urology.  Had a cystoscopy in April 2025.    Diabetes:  Not on medication for this.  Last A1C was 5.3 in Sep 2024.  POC today is 4.9.    HTN:  Taking Losartan.  BP today is 116/68.  Denies dizziness, headaches.    HLD:  Taking Atorvastatin.  Last checked Dec 2024.    Anxiety/Depression:  Taking Buspar and Celexa.  Denies SI/HI.    OA/Femur fracture:  Seeing ortho.  Occurred in Dec 2023.  Doing well!  Walking is going ok.    Elevated ferritin:  This was improved to normal in Dec 2024.    Left great toe wound/Osteomyelitis:  Seeing podiatry and wound care.  Hospitalized for this multiple times over the last 6 months.  Doing very well!    Health maintenance:  Smoking: Former smoker.  Labs: Dec 2024.  Influenza:    Review of Systems  12 point review of systems negative unless stated above in HPI    Objective   Vitals:  /68 (BP Location: Left arm, Patient Position: Sitting, BP Cuff Size: Small adult)   Pulse 74   Temp 36.4 °C (97.5 °F) (Temporal)   Ht 1.626 m (5' 4\")   Wt 75.8 kg (167 lb)   SpO2 98%   BMI 28.67 kg/m²       Physical Exam  General: Alert and oriented, well nourished, no acute " distress.  Lungs: Clear to auscultation, non-labored respiration.  Heart: Normal rate, regular rhythm, no murmur, gallop or edema.  Neurologic: Awake, alert, and oriented X3, CN II-XII intact.  Psychiatric: Cooperative, appropriate mood and affect.  Assessment & Plan  Medicare annual wellness visit, subsequent  It was good seeing you!  This counts as your annual Medicare Wellness visit.  Health maintenance was reviewed today.  Depression screening is negative (5 -15 min screening was completed).  A1C looks excellent!  Continue specialty care.  Continue the same medications.  Chronic conditions are stable.  Call with questions or concerns.    Follow up  6 months       Depression screening         Altered mental status, unspecified altered mental status type         Controlled type 2 diabetes mellitus with complication, without long-term current use of insulin (Multi)    Orders:    POCT glycosylated hemoglobin (Hb A1C) manually resulted    Diabetic nephropathy with proteinuria (Multi)    Orders:    DULoxetine (Cymbalta) 60 mg DR capsule; Take 1 capsule (60 mg) by mouth once daily. Do not crush or chew.    HTN (hypertension), benign         Mixed hyperlipidemia         Elevated ferritin         Recurrent major depressive disorder, in partial remission (CMS-HCC)         Anxiety         Urinary urgency         Routine general medical examination at health care facility    Orders:    1 Year Follow Up In Primary Care - Wellness Exam; Future

## 2025-05-28 NOTE — PROGRESS NOTES
Subjective   Patient ID:   Adriana Duran is a 80 y.o. female who presents for sore on right foot.  HPI  Here with acute symptoms:  Symptoms x 1 month.  Sore in between toes on right foot.  Has history of osteomyelitis.  Had previously seen wound care.  Using Neosporin and putting cotton between the toes currently.  Painful when toes rub against each other.    Hospital follow up:  Admitted from 9/23-9/25/24.  Discharge note reviewed.  Was having altered mental status.  CT showed severe sinusitis - was given IV antibiotics which transitioned to oral antibiotics.  Was discharged to a SNF.  Feeling better!  I referred to home health PT/OT  previously.    Memory concerns:  Taking Donepezil.    Urinary urgency:  Seeing urology.  Had a cystoscopy in April 2025.    Diabetes:  Not on medication for this.  Last A1C was 4.9 in May 2025.    HTN:  Taking Losartan.  BP today is 114/80.  Denies dizziness, headaches.    HLD:  Taking Atorvastatin.  Last checked Dec 2024.    Anxiety/Depression:  Taking Buspar and Celexa.  Denies SI/HI.    OA/Femur fracture:  Seeing ortho.  Occurred in Dec 2023.  Doing well!  Walking is going ok.    Elevated ferritin:  This was improved to normal in Dec 2024.    Left great toe wound/Osteomyelitis:  Seeing podiatry and wound care.  Hospitalized for this multiple times over the last 6 months.  Doing very well!    Health maintenance:  Smoking: Former smoker.  Labs: Dec 2024.  Influenza:    Review of Systems  12 point review of systems negative unless stated above in HPI    Vitals:    06/03/25 1426   BP: 114/80   Pulse: 79   SpO2: 97%     Physical Exam  General: Alert and oriented, well nourished, no acute distress.  Lungs: Clear to auscultation, non-labored respiration.  Heart: Normal rate, regular rhythm, no murmur, gallop or edema.  Neurologic: Awake, alert, and oriented X3, CN II-XII intact.  Psychiatric: Cooperative, appropriate mood and affect.  Feet: Bilateral feet with thickened, yellow  toenails. Right foot with small areas of erythema in between digits 1-2, 2-3, 3-4.    Assessment/Plan   It was good seeing you!  I have sent in Bactroban ointment and Keflex to treat the toes.  I have placed a referral to podiatry for further management.  If symptoms persist or worsen despite current plan of care, please contact your healthcare provider for further evaluation.  Patient instructed to contact the office if there are any questions regarding their care or treatment.   [facility] [phone]    Fu as scheduled  Diagnoses and all orders for this visit:  Open toe wound, sequela  -     mupirocin (Bactroban) 2 % ointment; Apply topically 3 times a day for 10 days. apply to affected area  -     cephalexin (Keflex) 500 mg capsule; Take 1 capsule (500 mg) by mouth 2 times a day for 7 days.  Tinea pedis of both feet  -     Referral to Podiatry; Future  Altered mental status, unspecified altered mental status type  Controlled type 2 diabetes mellitus with complication, without long-term current use of insulin (Multi)  Diabetic nephropathy with proteinuria (Multi)  HTN (hypertension), benign  Mixed hyperlipidemia  Recurrent major depressive disorder, in partial remission  Elevated ferritin  Closed nondisplaced intertrochanteric fracture of femur, unspecified laterality, sequela  BMI 28.0-28.9,adult  Diabetic peripheral neuropathy (Multi)  Pathological fracture of neck of femur, unspecified laterality, sequela  Osteomyelitis of great toe of left foot (Multi)

## 2025-06-03 ENCOUNTER — OFFICE VISIT (OUTPATIENT)
Dept: PRIMARY CARE | Facility: CLINIC | Age: 80
End: 2025-06-03
Payer: MEDICARE

## 2025-06-03 VITALS
WEIGHT: 166 LBS | HEIGHT: 64 IN | SYSTOLIC BLOOD PRESSURE: 114 MMHG | DIASTOLIC BLOOD PRESSURE: 80 MMHG | OXYGEN SATURATION: 97 % | BODY MASS INDEX: 28.34 KG/M2 | HEART RATE: 79 BPM

## 2025-06-03 DIAGNOSIS — E78.2 MIXED HYPERLIPIDEMIA: ICD-10-CM

## 2025-06-03 DIAGNOSIS — M86.9 OSTEOMYELITIS OF GREAT TOE OF LEFT FOOT (MULTI): ICD-10-CM

## 2025-06-03 DIAGNOSIS — B35.3 TINEA PEDIS OF BOTH FEET: ICD-10-CM

## 2025-06-03 DIAGNOSIS — E11.21 DIABETIC NEPHROPATHY WITH PROTEINURIA (MULTI): ICD-10-CM

## 2025-06-03 DIAGNOSIS — E11.42 DIABETIC PERIPHERAL NEUROPATHY (MULTI): ICD-10-CM

## 2025-06-03 DIAGNOSIS — S91.109S OPEN TOE WOUND, SEQUELA: Primary | ICD-10-CM

## 2025-06-03 DIAGNOSIS — R79.89 ELEVATED FERRITIN: ICD-10-CM

## 2025-06-03 DIAGNOSIS — I10 HTN (HYPERTENSION), BENIGN: ICD-10-CM

## 2025-06-03 DIAGNOSIS — R41.82 ALTERED MENTAL STATUS, UNSPECIFIED ALTERED MENTAL STATUS TYPE: ICD-10-CM

## 2025-06-03 DIAGNOSIS — F33.41 RECURRENT MAJOR DEPRESSIVE DISORDER, IN PARTIAL REMISSION: ICD-10-CM

## 2025-06-03 DIAGNOSIS — M84.453S: ICD-10-CM

## 2025-06-03 DIAGNOSIS — S72.146S CLOSED NONDISPLACED INTERTROCHANTERIC FRACTURE OF FEMUR, UNSPECIFIED LATERALITY, SEQUELA: ICD-10-CM

## 2025-06-03 DIAGNOSIS — E11.8 CONTROLLED TYPE 2 DIABETES MELLITUS WITH COMPLICATION, WITHOUT LONG-TERM CURRENT USE OF INSULIN (MULTI): ICD-10-CM

## 2025-06-03 PROBLEM — E11.65 HYPERGLYCEMIA DUE TO TYPE 2 DIABETES MELLITUS (MULTI): Status: RESOLVED | Noted: 2023-09-02 | Resolved: 2025-06-03

## 2025-06-03 PROCEDURE — 99214 OFFICE O/P EST MOD 30 MIN: CPT | Performed by: PHYSICIAN ASSISTANT

## 2025-06-03 RX ORDER — CEPHALEXIN 500 MG/1
500 CAPSULE ORAL 2 TIMES DAILY
Qty: 14 CAPSULE | Refills: 0 | Status: SHIPPED | OUTPATIENT
Start: 2025-06-03 | End: 2025-06-10

## 2025-06-03 RX ORDER — MUPIROCIN 20 MG/G
OINTMENT TOPICAL 3 TIMES DAILY
Qty: 30 G | Refills: 3 | Status: SHIPPED | OUTPATIENT
Start: 2025-06-03 | End: 2025-06-13

## 2025-06-03 ASSESSMENT — PAIN SCALES - GENERAL: PAINLEVEL_OUTOF10: 3

## 2025-06-03 ASSESSMENT — ENCOUNTER SYMPTOMS
OCCASIONAL FEELINGS OF UNSTEADINESS: 0
LOSS OF SENSATION IN FEET: 0
DEPRESSION: 0

## 2025-07-20 ENCOUNTER — APPOINTMENT (OUTPATIENT)
Dept: RADIOLOGY | Facility: HOSPITAL | Age: 80
DRG: 481 | End: 2025-07-20
Payer: MEDICARE

## 2025-07-20 ENCOUNTER — HOSPITAL ENCOUNTER (INPATIENT)
Facility: HOSPITAL | Age: 80
DRG: 481 | End: 2025-07-20
Attending: INTERNAL MEDICINE | Admitting: INTERNAL MEDICINE
Payer: MEDICARE

## 2025-07-20 VITALS
HEART RATE: 78 BPM | OXYGEN SATURATION: 97 % | SYSTOLIC BLOOD PRESSURE: 119 MMHG | HEIGHT: 64 IN | TEMPERATURE: 98.1 F | BODY MASS INDEX: 30.3 KG/M2 | DIASTOLIC BLOOD PRESSURE: 51 MMHG | RESPIRATION RATE: 16 BRPM | WEIGHT: 177.47 LBS

## 2025-07-20 DIAGNOSIS — S72.141A CLOSED DISPLACED INTERTROCHANTERIC FRACTURE OF RIGHT FEMUR, INITIAL ENCOUNTER: Primary | ICD-10-CM

## 2025-07-20 DIAGNOSIS — J01.40 ACUTE NON-RECURRENT PANSINUSITIS: ICD-10-CM

## 2025-07-20 PROBLEM — E11.9 TYPE 2 DIABETES MELLITUS: Status: ACTIVE | Noted: 2025-07-20

## 2025-07-20 LAB
ALBUMIN SERPL BCP-MCNC: 3.7 G/DL (ref 3.4–5)
ALP SERPL-CCNC: 61 U/L (ref 33–136)
ALT SERPL W P-5'-P-CCNC: 11 U/L (ref 7–45)
ANION GAP SERPL CALCULATED.3IONS-SCNC: 10 MMOL/L (ref 10–20)
AST SERPL W P-5'-P-CCNC: 15 U/L (ref 9–39)
BASOPHILS # BLD AUTO: 0.03 X10*3/UL (ref 0–0.1)
BASOPHILS NFR BLD AUTO: 0.6 %
BILIRUB SERPL-MCNC: 0.4 MG/DL (ref 0–1.2)
BUN SERPL-MCNC: 21 MG/DL (ref 6–23)
CALCIUM SERPL-MCNC: 9.2 MG/DL (ref 8.6–10.3)
CHLORIDE SERPL-SCNC: 107 MMOL/L (ref 98–107)
CO2 SERPL-SCNC: 29 MMOL/L (ref 21–32)
CREAT SERPL-MCNC: 1.21 MG/DL (ref 0.5–1.05)
EGFRCR SERPLBLD CKD-EPI 2021: 45 ML/MIN/1.73M*2
EOSINOPHIL # BLD AUTO: 0.19 X10*3/UL (ref 0–0.4)
EOSINOPHIL NFR BLD AUTO: 3.6 %
ERYTHROCYTE [DISTWIDTH] IN BLOOD BY AUTOMATED COUNT: 16 % (ref 11.5–14.5)
GLUCOSE BLD MANUAL STRIP-MCNC: 129 MG/DL (ref 74–99)
GLUCOSE SERPL-MCNC: 135 MG/DL (ref 74–99)
HCT VFR BLD AUTO: 33.8 % (ref 36–46)
HGB BLD-MCNC: 10.5 G/DL (ref 12–16)
IMM GRANULOCYTES # BLD AUTO: 0.03 X10*3/UL (ref 0–0.5)
IMM GRANULOCYTES NFR BLD AUTO: 0.6 % (ref 0–0.9)
LYMPHOCYTES # BLD AUTO: 1.24 X10*3/UL (ref 0.8–3)
LYMPHOCYTES NFR BLD AUTO: 23.7 %
MCH RBC QN AUTO: 30.1 PG (ref 26–34)
MCHC RBC AUTO-ENTMCNC: 31.1 G/DL (ref 32–36)
MCV RBC AUTO: 97 FL (ref 80–100)
MONOCYTES # BLD AUTO: 0.45 X10*3/UL (ref 0.05–0.8)
MONOCYTES NFR BLD AUTO: 8.6 %
NEUTROPHILS # BLD AUTO: 3.3 X10*3/UL (ref 1.6–5.5)
NEUTROPHILS NFR BLD AUTO: 62.9 %
NRBC BLD-RTO: 0 /100 WBCS (ref 0–0)
PLATELET # BLD AUTO: 166 X10*3/UL (ref 150–450)
POTASSIUM SERPL-SCNC: 4.1 MMOL/L (ref 3.5–5.3)
PROT SERPL-MCNC: 6.6 G/DL (ref 6.4–8.2)
RBC # BLD AUTO: 3.49 X10*6/UL (ref 4–5.2)
SODIUM SERPL-SCNC: 142 MMOL/L (ref 136–145)
WBC # BLD AUTO: 5.2 X10*3/UL (ref 4.4–11.3)

## 2025-07-20 PROCEDURE — 2500000004 HC RX 250 GENERAL PHARMACY W/ HCPCS (ALT 636 FOR OP/ED)

## 2025-07-20 PROCEDURE — 73502 X-RAY EXAM HIP UNI 2-3 VIEWS: CPT | Mod: RT

## 2025-07-20 PROCEDURE — 2500000001 HC RX 250 WO HCPCS SELF ADMINISTERED DRUGS (ALT 637 FOR MEDICARE OP): Performed by: INTERNAL MEDICINE

## 2025-07-20 PROCEDURE — 2500000004 HC RX 250 GENERAL PHARMACY W/ HCPCS (ALT 636 FOR OP/ED): Performed by: INTERNAL MEDICINE

## 2025-07-20 PROCEDURE — 85025 COMPLETE CBC W/AUTO DIFF WBC: CPT

## 2025-07-20 PROCEDURE — 99222 1ST HOSP IP/OBS MODERATE 55: CPT | Performed by: INTERNAL MEDICINE

## 2025-07-20 PROCEDURE — 99285 EMERGENCY DEPT VISIT HI MDM: CPT | Mod: 25

## 2025-07-20 PROCEDURE — 82947 ASSAY GLUCOSE BLOOD QUANT: CPT

## 2025-07-20 PROCEDURE — 36415 COLL VENOUS BLD VENIPUNCTURE: CPT

## 2025-07-20 PROCEDURE — 96374 THER/PROPH/DIAG INJ IV PUSH: CPT

## 2025-07-20 PROCEDURE — 1100000001 HC PRIVATE ROOM DAILY

## 2025-07-20 PROCEDURE — 73502 X-RAY EXAM HIP UNI 2-3 VIEWS: CPT | Mod: RIGHT SIDE | Performed by: RADIOLOGY

## 2025-07-20 PROCEDURE — 96375 TX/PRO/DX INJ NEW DRUG ADDON: CPT

## 2025-07-20 PROCEDURE — 80053 COMPREHEN METABOLIC PANEL: CPT

## 2025-07-20 RX ORDER — KETOROLAC TROMETHAMINE 15 MG/ML
15 INJECTION, SOLUTION INTRAMUSCULAR; INTRAVENOUS ONCE
Status: COMPLETED | OUTPATIENT
Start: 2025-07-20 | End: 2025-07-20

## 2025-07-20 RX ORDER — MORPHINE SULFATE 4 MG/ML
4 INJECTION, SOLUTION INTRAMUSCULAR; INTRAVENOUS EVERY 4 HOURS PRN
Status: DISCONTINUED | OUTPATIENT
Start: 2025-07-20 | End: 2025-07-22

## 2025-07-20 RX ORDER — DOCUSATE SODIUM 100 MG/1
100 CAPSULE, LIQUID FILLED ORAL 2 TIMES DAILY
Status: DISCONTINUED | OUTPATIENT
Start: 2025-07-20 | End: 2025-07-21

## 2025-07-20 RX ORDER — LOSARTAN POTASSIUM 100 MG/1
100 TABLET ORAL DAILY
Status: DISCONTINUED | OUTPATIENT
Start: 2025-07-21 | End: 2025-07-25 | Stop reason: HOSPADM

## 2025-07-20 RX ORDER — TALC
3 POWDER (GRAM) TOPICAL NIGHTLY PRN
Status: DISCONTINUED | OUTPATIENT
Start: 2025-07-20 | End: 2025-07-25 | Stop reason: HOSPADM

## 2025-07-20 RX ORDER — MORPHINE SULFATE 4 MG/ML
4 INJECTION, SOLUTION INTRAMUSCULAR; INTRAVENOUS ONCE
Status: COMPLETED | OUTPATIENT
Start: 2025-07-20 | End: 2025-07-20

## 2025-07-20 RX ORDER — BUSPIRONE HYDROCHLORIDE 10 MG/1
10 TABLET ORAL 3 TIMES DAILY
Status: DISCONTINUED | OUTPATIENT
Start: 2025-07-20 | End: 2025-07-25 | Stop reason: HOSPADM

## 2025-07-20 RX ORDER — OLANZAPINE 2.5 MG/1
2.5 TABLET, FILM COATED ORAL NIGHTLY
Status: DISCONTINUED | OUTPATIENT
Start: 2025-07-20 | End: 2025-07-25 | Stop reason: HOSPADM

## 2025-07-20 RX ORDER — POLYETHYLENE GLYCOL 3350 17 G/17G
17 POWDER, FOR SOLUTION ORAL DAILY
Status: DISCONTINUED | OUTPATIENT
Start: 2025-07-20 | End: 2025-07-21

## 2025-07-20 RX ORDER — SODIUM CHLORIDE, SODIUM LACTATE, POTASSIUM CHLORIDE, CALCIUM CHLORIDE 600; 310; 30; 20 MG/100ML; MG/100ML; MG/100ML; MG/100ML
75 INJECTION, SOLUTION INTRAVENOUS CONTINUOUS
Status: DISCONTINUED | OUTPATIENT
Start: 2025-07-20 | End: 2025-07-21

## 2025-07-20 RX ORDER — ASPIRIN 81 MG/1
81 TABLET ORAL DAILY
Status: DISCONTINUED | OUTPATIENT
Start: 2025-07-20 | End: 2025-07-21

## 2025-07-20 RX ORDER — DULOXETIN HYDROCHLORIDE 60 MG/1
60 CAPSULE, DELAYED RELEASE ORAL DAILY
Status: DISCONTINUED | OUTPATIENT
Start: 2025-07-21 | End: 2025-07-25 | Stop reason: HOSPADM

## 2025-07-20 RX ORDER — ATORVASTATIN CALCIUM 20 MG/1
20 TABLET, FILM COATED ORAL NIGHTLY
Status: DISCONTINUED | OUTPATIENT
Start: 2025-07-20 | End: 2025-07-25 | Stop reason: HOSPADM

## 2025-07-20 RX ORDER — ACETAMINOPHEN 650 MG/1
650 SUPPOSITORY RECTAL EVERY 4 HOURS PRN
Status: DISCONTINUED | OUTPATIENT
Start: 2025-07-20 | End: 2025-07-25 | Stop reason: HOSPADM

## 2025-07-20 RX ORDER — DONEPEZIL HYDROCHLORIDE 5 MG/1
5 TABLET, FILM COATED ORAL NIGHTLY
Status: DISCONTINUED | OUTPATIENT
Start: 2025-07-20 | End: 2025-07-25 | Stop reason: HOSPADM

## 2025-07-20 RX ORDER — ACETAMINOPHEN 325 MG/1
650 TABLET ORAL EVERY 4 HOURS PRN
Status: DISCONTINUED | OUTPATIENT
Start: 2025-07-20 | End: 2025-07-25 | Stop reason: HOSPADM

## 2025-07-20 RX ORDER — ONDANSETRON HYDROCHLORIDE 2 MG/ML
4 INJECTION, SOLUTION INTRAVENOUS ONCE
Status: COMPLETED | OUTPATIENT
Start: 2025-07-20 | End: 2025-07-20

## 2025-07-20 RX ORDER — INSULIN LISPRO 100 [IU]/ML
0-10 INJECTION, SOLUTION INTRAVENOUS; SUBCUTANEOUS
Status: DISCONTINUED | OUTPATIENT
Start: 2025-07-21 | End: 2025-07-25 | Stop reason: HOSPADM

## 2025-07-20 RX ORDER — CETIRIZINE HYDROCHLORIDE 10 MG/1
10 TABLET ORAL NIGHTLY
Status: DISCONTINUED | OUTPATIENT
Start: 2025-07-20 | End: 2025-07-25 | Stop reason: HOSPADM

## 2025-07-20 RX ORDER — CITALOPRAM 40 MG/1
40 TABLET ORAL NIGHTLY
Status: DISCONTINUED | OUTPATIENT
Start: 2025-07-20 | End: 2025-07-25 | Stop reason: HOSPADM

## 2025-07-20 RX ORDER — MORPHINE SULFATE 2 MG/ML
2 INJECTION, SOLUTION INTRAMUSCULAR; INTRAVENOUS EVERY 4 HOURS PRN
Status: DISCONTINUED | OUTPATIENT
Start: 2025-07-20 | End: 2025-07-22

## 2025-07-20 RX ORDER — ONDANSETRON HYDROCHLORIDE 2 MG/ML
4 INJECTION, SOLUTION INTRAVENOUS EVERY 8 HOURS PRN
Status: DISCONTINUED | OUTPATIENT
Start: 2025-07-20 | End: 2025-07-25 | Stop reason: HOSPADM

## 2025-07-20 RX ORDER — ONDANSETRON 4 MG/1
4 TABLET, ORALLY DISINTEGRATING ORAL EVERY 8 HOURS PRN
Status: DISCONTINUED | OUTPATIENT
Start: 2025-07-20 | End: 2025-07-25 | Stop reason: HOSPADM

## 2025-07-20 RX ORDER — FLUTICASONE PROPIONATE 50 MCG
2 SPRAY, SUSPENSION (ML) NASAL DAILY
Status: DISCONTINUED | OUTPATIENT
Start: 2025-07-21 | End: 2025-07-25 | Stop reason: HOSPADM

## 2025-07-20 RX ORDER — ACETAMINOPHEN 160 MG/5ML
650 SOLUTION ORAL EVERY 4 HOURS PRN
Status: DISCONTINUED | OUTPATIENT
Start: 2025-07-20 | End: 2025-07-25 | Stop reason: HOSPADM

## 2025-07-20 RX ORDER — LIDOCAINE 560 MG/1
1 PATCH PERCUTANEOUS; TOPICAL; TRANSDERMAL DAILY
Status: DISCONTINUED | OUTPATIENT
Start: 2025-07-20 | End: 2025-07-25 | Stop reason: HOSPADM

## 2025-07-20 RX ORDER — HEPARIN SODIUM 5000 [USP'U]/ML
5000 INJECTION, SOLUTION INTRAVENOUS; SUBCUTANEOUS ONCE
Status: COMPLETED | OUTPATIENT
Start: 2025-07-20 | End: 2025-07-20

## 2025-07-20 RX ADMIN — KETOROLAC TROMETHAMINE 15 MG: 15 INJECTION, SOLUTION INTRAMUSCULAR; INTRAVENOUS at 17:56

## 2025-07-20 RX ADMIN — SODIUM CHLORIDE, SODIUM LACTATE, POTASSIUM CHLORIDE, AND CALCIUM CHLORIDE 75 ML/HR: 600; 310; 30; 20 INJECTION, SOLUTION INTRAVENOUS at 21:55

## 2025-07-20 RX ADMIN — MORPHINE SULFATE 4 MG: 4 INJECTION, SOLUTION INTRAMUSCULAR; INTRAVENOUS at 17:56

## 2025-07-20 RX ADMIN — DONEPEZIL HYDROCHLORIDE 5 MG: 5 TABLET, FILM COATED ORAL at 21:55

## 2025-07-20 RX ADMIN — MORPHINE SULFATE 4 MG: 4 INJECTION, SOLUTION INTRAMUSCULAR; INTRAVENOUS at 20:11

## 2025-07-20 RX ADMIN — CITALOPRAM 40 MG: 40 TABLET, FILM COATED ORAL at 21:55

## 2025-07-20 RX ADMIN — DOCUSATE SODIUM 100 MG: 100 CAPSULE, LIQUID FILLED ORAL at 21:55

## 2025-07-20 RX ADMIN — HEPARIN SODIUM 5000 UNITS: 5000 INJECTION, SOLUTION INTRAVENOUS; SUBCUTANEOUS at 21:55

## 2025-07-20 RX ADMIN — BUSPIRONE HYDROCHLORIDE 10 MG: 10 TABLET ORAL at 21:55

## 2025-07-20 RX ADMIN — ONDANSETRON 4 MG: 2 INJECTION, SOLUTION INTRAMUSCULAR; INTRAVENOUS at 17:56

## 2025-07-20 RX ADMIN — SODIUM CHLORIDE 1000 ML: 900 INJECTION, SOLUTION INTRAVENOUS at 17:55

## 2025-07-20 SDOH — SOCIAL STABILITY: SOCIAL INSECURITY: HAS ANYONE EVER THREATENED TO HURT YOUR FAMILY OR YOUR PETS?: NO

## 2025-07-20 SDOH — SOCIAL STABILITY: SOCIAL INSECURITY: HAVE YOU HAD ANY THOUGHTS OF HARMING ANYONE ELSE?: NO

## 2025-07-20 SDOH — HEALTH STABILITY: PHYSICAL HEALTH: ON AVERAGE, HOW MANY DAYS PER WEEK DO YOU ENGAGE IN MODERATE TO STRENUOUS EXERCISE (LIKE A BRISK WALK)?: 0 DAYS

## 2025-07-20 SDOH — SOCIAL STABILITY: SOCIAL INSECURITY: WERE YOU ABLE TO COMPLETE ALL THE BEHAVIORAL HEALTH SCREENINGS?: YES

## 2025-07-20 SDOH — SOCIAL STABILITY: SOCIAL INSECURITY: HAVE YOU HAD THOUGHTS OF HARMING ANYONE ELSE?: YES

## 2025-07-20 SDOH — SOCIAL STABILITY: SOCIAL INSECURITY: WITHIN THE LAST YEAR, HAVE YOU BEEN HUMILIATED OR EMOTIONALLY ABUSED IN OTHER WAYS BY YOUR PARTNER OR EX-PARTNER?: NO

## 2025-07-20 SDOH — SOCIAL STABILITY: SOCIAL INSECURITY: ARE YOU MARRIED, WIDOWED, DIVORCED, SEPARATED, NEVER MARRIED, OR LIVING WITH A PARTNER?: MARRIED

## 2025-07-20 SDOH — SOCIAL STABILITY: SOCIAL INSECURITY: WITHIN THE LAST YEAR, HAVE YOU BEEN AFRAID OF YOUR PARTNER OR EX-PARTNER?: NO

## 2025-07-20 SDOH — SOCIAL STABILITY: SOCIAL NETWORK: HOW OFTEN DO YOU ATTEND CHURCH OR RELIGIOUS SERVICES?: NEVER

## 2025-07-20 SDOH — HEALTH STABILITY: PHYSICAL HEALTH: ON AVERAGE, HOW MANY MINUTES DO YOU ENGAGE IN EXERCISE AT THIS LEVEL?: 0 MIN

## 2025-07-20 SDOH — SOCIAL STABILITY: SOCIAL INSECURITY: DO YOU FEEL UNSAFE GOING BACK TO THE PLACE WHERE YOU ARE LIVING?: NO

## 2025-07-20 SDOH — ECONOMIC STABILITY: FOOD INSECURITY: WITHIN THE PAST 12 MONTHS, THE FOOD YOU BOUGHT JUST DIDN'T LAST AND YOU DIDN'T HAVE MONEY TO GET MORE.: NEVER TRUE

## 2025-07-20 SDOH — ECONOMIC STABILITY: INCOME INSECURITY: IN THE PAST 12 MONTHS HAS THE ELECTRIC, GAS, OIL, OR WATER COMPANY THREATENED TO SHUT OFF SERVICES IN YOUR HOME?: NO

## 2025-07-20 SDOH — HEALTH STABILITY: MENTAL HEALTH
DO YOU FEEL STRESS - TENSE, RESTLESS, NERVOUS, OR ANXIOUS, OR UNABLE TO SLEEP AT NIGHT BECAUSE YOUR MIND IS TROUBLED ALL THE TIME - THESE DAYS?: NOT AT ALL

## 2025-07-20 SDOH — HEALTH STABILITY: MENTAL HEALTH: HOW OFTEN DO YOU HAVE A DRINK CONTAINING ALCOHOL?: NEVER

## 2025-07-20 SDOH — SOCIAL STABILITY: SOCIAL NETWORK: HOW OFTEN DO YOU ATTEND MEETINGS OF THE CLUBS OR ORGANIZATIONS YOU BELONG TO?: NEVER

## 2025-07-20 SDOH — ECONOMIC STABILITY: FOOD INSECURITY: WITHIN THE PAST 12 MONTHS, YOU WORRIED THAT YOUR FOOD WOULD RUN OUT BEFORE YOU GOT THE MONEY TO BUY MORE.: NEVER TRUE

## 2025-07-20 SDOH — SOCIAL STABILITY: SOCIAL NETWORK
DO YOU BELONG TO ANY CLUBS OR ORGANIZATIONS SUCH AS CHURCH GROUPS, UNIONS, FRATERNAL OR ATHLETIC GROUPS, OR SCHOOL GROUPS?: NO

## 2025-07-20 SDOH — SOCIAL STABILITY: SOCIAL INSECURITY
WITHIN THE LAST YEAR, HAVE YOU BEEN RAPED OR FORCED TO HAVE ANY KIND OF SEXUAL ACTIVITY BY YOUR PARTNER OR EX-PARTNER?: NO

## 2025-07-20 SDOH — SOCIAL STABILITY: SOCIAL INSECURITY: ABUSE: ADULT

## 2025-07-20 SDOH — SOCIAL STABILITY: SOCIAL NETWORK: HOW OFTEN DO YOU GET TOGETHER WITH FRIENDS OR RELATIVES?: ONCE A WEEK

## 2025-07-20 SDOH — SOCIAL STABILITY: SOCIAL INSECURITY: DO YOU FEEL ANYONE HAS EXPLOITED OR TAKEN ADVANTAGE OF YOU FINANCIALLY OR OF YOUR PERSONAL PROPERTY?: NO

## 2025-07-20 SDOH — SOCIAL STABILITY: SOCIAL INSECURITY: DOES ANYONE TRY TO KEEP YOU FROM HAVING/CONTACTING OTHER FRIENDS OR DOING THINGS OUTSIDE YOUR HOME?: NO

## 2025-07-20 SDOH — SOCIAL STABILITY: SOCIAL INSECURITY: ARE YOU OR HAVE YOU BEEN THREATENED OR ABUSED PHYSICALLY, EMOTIONALLY, OR SEXUALLY BY ANYONE?: NO

## 2025-07-20 SDOH — SOCIAL STABILITY: SOCIAL INSECURITY: ARE THERE ANY APPARENT SIGNS OF INJURIES/BEHAVIORS THAT COULD BE RELATED TO ABUSE/NEGLECT?: NO

## 2025-07-20 ASSESSMENT — ACTIVITIES OF DAILY LIVING (ADL)
TOILETING: NEEDS ASSISTANCE
HEARING - LEFT EAR: FUNCTIONAL
BATHING: NEEDS ASSISTANCE
PATIENT'S MEMORY ADEQUATE TO SAFELY COMPLETE DAILY ACTIVITIES?: YES
ASSISTIVE_DEVICE: WALKER
DRESSING YOURSELF: INDEPENDENT
HEARING - RIGHT EAR: FUNCTIONAL
JUDGMENT_ADEQUATE_SAFELY_COMPLETE_DAILY_ACTIVITIES: YES
ADEQUATE_TO_COMPLETE_ADL: YES
GROOMING: NEEDS ASSISTANCE
FEEDING YOURSELF: NEEDS ASSISTANCE
LACK_OF_TRANSPORTATION: NO
WALKS IN HOME: INDEPENDENT

## 2025-07-20 ASSESSMENT — PAIN SCALES - GENERAL
PAINLEVEL_OUTOF10: 0 - NO PAIN
PAINLEVEL_OUTOF10: 3
PAINLEVEL_OUTOF10: 10 - WORST POSSIBLE PAIN

## 2025-07-20 ASSESSMENT — LIFESTYLE VARIABLES
AUDIT-C TOTAL SCORE: 0
HOW MANY STANDARD DRINKS CONTAINING ALCOHOL DO YOU HAVE ON A TYPICAL DAY: PATIENT DOES NOT DRINK
SKIP TO QUESTIONS 9-10: 1
HOW OFTEN DO YOU HAVE 6 OR MORE DRINKS ON ONE OCCASION: NEVER
HOW OFTEN DO YOU HAVE A DRINK CONTAINING ALCOHOL: NEVER
AUDIT-C TOTAL SCORE: 0

## 2025-07-20 ASSESSMENT — PATIENT HEALTH QUESTIONNAIRE - PHQ9
SUM OF ALL RESPONSES TO PHQ9 QUESTIONS 1 & 2: 0
1. LITTLE INTEREST OR PLEASURE IN DOING THINGS: NOT AT ALL
2. FEELING DOWN, DEPRESSED OR HOPELESS: NOT AT ALL

## 2025-07-20 ASSESSMENT — COGNITIVE AND FUNCTIONAL STATUS - GENERAL
PERSONAL GROOMING: A LITTLE
CLIMB 3 TO 5 STEPS WITH RAILING: TOTAL
TURNING FROM BACK TO SIDE WHILE IN FLAT BAD: A LITTLE
STANDING UP FROM CHAIR USING ARMS: A LITTLE
WALKING IN HOSPITAL ROOM: A LOT
HELP NEEDED FOR BATHING: A LITTLE
DAILY ACTIVITIY SCORE: 19
DRESSING REGULAR LOWER BODY CLOTHING: A LITTLE
MOVING TO AND FROM BED TO CHAIR: A LITTLE
MOBILITY SCORE: 15
PATIENT BASELINE BEDBOUND: NO
DRESSING REGULAR UPPER BODY CLOTHING: A LITTLE
MOVING FROM LYING ON BACK TO SITTING ON SIDE OF FLAT BED WITH BEDRAILS: A LITTLE
TOILETING: A LITTLE

## 2025-07-20 ASSESSMENT — PAIN - FUNCTIONAL ASSESSMENT: PAIN_FUNCTIONAL_ASSESSMENT: 0-10

## 2025-07-20 ASSESSMENT — PAIN DESCRIPTION - PROGRESSION: CLINICAL_PROGRESSION: NOT CHANGED

## 2025-07-20 NOTE — H&P
History Of Present Illness  Adriana Duran is a 80 y.o. female presenting with mechanical fall.  Patient normally uses a walker, but was trying to place a glass across the room without this.  She fell onto her right side with pain, presented emergency department and found to have a right intertrochanteric fracture and case was discussed between ER and orthopedic surgery..  Patient follows with Dr. Shanice Levy and takes bicarbonate 650 mg 3 times a day.  Also follows with  primary care and is on aspirin once in the morning.  No other blood thinners.  She has been in her normal state of health over the last week.  She is on Aricept for cognitive decline and is on a lower dose of olanzapine 2.5 mg at night.  Her significant other states that she takes Colace every day and occasionally has to take milk of magnesia for constipation.    Pain is well-controlled currently after receiving morphine.  She has sensation bilateral lower extremities which is symmetric.     Past Medical History  She has a past medical history of Arthritis, Chronic kidney disease, Diabetes mellitus (Multi), Foot pain, left, Hypertension, Right knee pain, and UTI (urinary tract infection).    Surgical History  She has a past surgical history that includes Hip fracture surgery (Left); Knee surgery (Left); Knee surgery (Right); Shoulder surgery (Left); Total shoulder arthroplasty; and Knee Arthroplasty.     Social History  She reports that she has quit smoking. Her smoking use included cigarettes. She has never been exposed to tobacco smoke. She has never used smokeless tobacco. She reports that she does not drink alcohol and does not use drugs.    Family History  Family History[1]     Allergies  Strawberry, Ibuprofen, and Metformin    Review of Systems   All other systems reviewed and are negative.       Physical Exam  Constitutional:       General: She is not in acute distress.  HENT:      Right Ear: External ear normal.      Left Ear: External  ear normal.     Eyes:      Conjunctiva/sclera: Conjunctivae normal.       Cardiovascular:      Rate and Rhythm: Normal rate.   Pulmonary:      Effort: Pulmonary effort is normal.      Breath sounds: Normal breath sounds.   Abdominal:      General: Abdomen is flat.     Musculoskeletal:      Right lower leg: No edema.      Left lower leg: No edema.      Comments: External rotation right lower extremity     Skin:     General: Skin is warm.     Neurological:      Mental Status: She is alert. Mental status is at baseline.      Cranial Nerves: No cranial nerve deficit.     Psychiatric:         Mood and Affect: Mood normal.          Last Recorded Vitals  /75 (BP Location: Right arm, Patient Position: Lying)   Pulse 77   Temp 37 °C (98.6 °F) (Oral)   Resp 18   Wt 75 kg (165 lb 5.5 oz)   SpO2 100%     Relevant Results        Results for orders placed or performed during the hospital encounter of 07/20/25 (from the past 24 hours)   CBC and Auto Differential   Result Value Ref Range    WBC 5.2 4.4 - 11.3 x10*3/uL    nRBC 0.0 0.0 - 0.0 /100 WBCs    RBC 3.49 (L) 4.00 - 5.20 x10*6/uL    Hemoglobin 10.5 (L) 12.0 - 16.0 g/dL    Hematocrit 33.8 (L) 36.0 - 46.0 %    MCV 97 80 - 100 fL    MCH 30.1 26.0 - 34.0 pg    MCHC 31.1 (L) 32.0 - 36.0 g/dL    RDW 16.0 (H) 11.5 - 14.5 %    Platelets 166 150 - 450 x10*3/uL    Neutrophils % 62.9 40.0 - 80.0 %    Immature Granulocytes %, Automated 0.6 0.0 - 0.9 %    Lymphocytes % 23.7 13.0 - 44.0 %    Monocytes % 8.6 2.0 - 10.0 %    Eosinophils % 3.6 0.0 - 6.0 %    Basophils % 0.6 0.0 - 2.0 %    Neutrophils Absolute 3.30 1.60 - 5.50 x10*3/uL    Immature Granulocytes Absolute, Automated 0.03 0.00 - 0.50 x10*3/uL    Lymphocytes Absolute 1.24 0.80 - 3.00 x10*3/uL    Monocytes Absolute 0.45 0.05 - 0.80 x10*3/uL    Eosinophils Absolute 0.19 0.00 - 0.40 x10*3/uL    Basophils Absolute 0.03 0.00 - 0.10 x10*3/uL   Comprehensive metabolic panel   Result Value Ref Range    Glucose 135 (H) 74 - 99  mg/dL    Sodium 142 136 - 145 mmol/L    Potassium 4.1 3.5 - 5.3 mmol/L    Chloride 107 98 - 107 mmol/L    Bicarbonate 29 21 - 32 mmol/L    Anion Gap 10 10 - 20 mmol/L    Urea Nitrogen 21 6 - 23 mg/dL    Creatinine 1.21 (H) 0.50 - 1.05 mg/dL    eGFR 45 (L) >60 mL/min/1.73m*2    Calcium 9.2 8.6 - 10.3 mg/dL    Albumin 3.7 3.4 - 5.0 g/dL    Alkaline Phosphatase 61 33 - 136 U/L    Total Protein 6.6 6.4 - 8.2 g/dL    AST 15 9 - 39 U/L    Bilirubin, Total 0.4 0.0 - 1.2 mg/dL    ALT 11 7 - 45 U/L         Assessment/Plan   Assessment & Plan  Closed displaced intertrochanteric fracture of right femur, initial encounter  N.p.o. at midnight with sips of water  Morphine 2 and 4 mg as needed for pain.  Lidocaine patch.  Will start on lactated Ringer's while n.p.o. 75 mL/h  Orthopedic consult  Type 2 diabetes mellitus  Sliding scale  CKD (chronic kidney disease)  Stable.  With the patient's bicarbonate at 29, will hold off on further supplementation and get daily labs.    Will need to order physical therapy and Occupational Therapy after surgery.       Christ Benoit DO           [1]   Family History  Problem Relation Name Age of Onset    Heart disease Father      Cancer Father      Diabetes Paternal Grandfather

## 2025-07-20 NOTE — ED NOTES
To ED from home, via [facility], pt was at home got up from sitting to take her plate to the sink and fell to the ground. Pt unsure how she fell, normally walks with a walker, but was not using the walker at the time of the fall. Denies hitting her head, no blood thinners other than a baby aspirin daily. Pt reports pain is minimal at rest, but does increase with movement.     Pt lives with her . She is A&Ox4. Has [facility] in place currently.      Rush Valdes RN  07/20/25 1953

## 2025-07-20 NOTE — ED PROVIDER NOTES
HPI   No chief complaint on file.      HPI  Patient is an 80-year-old female who presents to ED for chief complaint of fall.  Patient states she was walking through her house without her walker, states she tripped and fell onto her right hip.  She denies any head strike, blood thinner use, chest pain, shortness of breath, abdominal pain, NVD.  Patient does have dried emesis on her mouth.  She denies any urinary symptoms.  Hospitalizations or surgeries.  Denies any recent travel or sick contacts.      Patient History   Medical History[1]  Surgical History[2]  Family History[3]  Social History[4]    Physical Exam   ED Triage Vitals   Temp Pulse Resp BP   -- -- -- --      SpO2 Temp src Heart Rate Source Patient Position   -- -- -- --      BP Location FiO2 (%)     -- --       Physical Exam  Vitals reviewed.   Constitutional:       Appearance: Normal appearance.   HENT:      Head: Atraumatic.     Eyes:      Extraocular Movements: Extraocular movements intact.       Cardiovascular:      Rate and Rhythm: Normal rate and regular rhythm.      Heart sounds: Normal heart sounds.   Pulmonary:      Effort: Pulmonary effort is normal.      Breath sounds: Normal breath sounds.   Abdominal:      General: Abdomen is flat.     Musculoskeletal:         General: Normal range of motion.      Cervical back: Normal range of motion.      Comments: Right leg is shortened and internally rotated, patient reports tenderness along the femur and right hip, decreased strength and range of motion.  Palpable distal pulses.     Skin:     General: Skin is warm and dry.     Neurological:      General: No focal deficit present.      Mental Status: She is alert and oriented to person, place, and time.     Psychiatric:         Mood and Affect: Mood normal.         Behavior: Behavior normal.           ED Course & MDM   Diagnoses as of 07/20/25 2107   Closed displaced intertrochanteric fracture of right femur, initial encounter                 No data  recorded                                 Medical Decision Making  Parts of this chart have been completed using voice recognition software. Please excuse any errors of transcription.  My thought process and reason for plan has been formulated from the time that I saw the patient until the time of disposition and is not specific to one specific moment during their visit and furthermore my MDM encompasses this entire chart and not only this text box.    HPI:   A medically appropriate HPI was obtained, outlined above.    Adriana Duran is a  80 y.o. female    Chief Complaint   Patient presents with    Fall     Fell fell walking in to her kitchen. Denies loc. -bt. C/o of hip and leg pain on the right side.       Medical History[5]    Surgical History[6]    Social History[7]    Family History[8]    Allergies[9]    Current Outpatient Medications   Medication Instructions    acetaminophen (TYLENOL) 650 mg, oral, Every 6 hours PRN    aspirin (ADULT LOW DOSE ASPIRIN) 81 mg, Daily    atorvastatin (LIPITOR) 20 mg, oral, Daily    busPIRone (BUSPAR) 10 mg, oral, 3 times daily    cetirizine (ZYRTEC) 10 mg, oral, Daily    citalopram (CELEXA) 40 mg, Daily    cyanocobalamin (VITAMIN B-12) 100 mcg, Daily    docusate sodium (COLACE) 100 mg, 2 times daily PRN    donepezil (ARICEPT) 5 mg, oral, Nightly    DULoxetine (CYMBALTA) 60 mg, oral, Daily, Do not crush or chew.    fluticasone (Flonase) 50 mcg/actuation nasal spray 2 sprays, Each Nostril, Daily, Shake gently. Before first use, prime pump. After use, clean tip and replace cap.    losartan (COZAAR) 100 mg, oral, Daily    multivitamin tablet 1 tablet, Daily    mupirocin (Bactroban) 2 % ointment Topical, 3 times daily, apply to affected area    OLANZapine (ZYPREXA) 2.5 mg, oral, Nightly    phenylephrine (Faisal-Synephrine) 0.25 % nasal spray 2 sprays, Each Nostril, 3 times daily, Do not use for more than 3 days.    sodium bicarbonate 650 mg, 3 times daily    solifenacin (VESICARE) 10  "mg, oral, Daily, Swallow tablet whole; do not crush, chew, or split.   for details    Exam:   Patient Vitals for the past 24 hrs:   BP Temp Temp src Pulse Resp SpO2 Height Weight   07/20/25 2037 144/52 36.7 °C (98.1 °F) Oral 79 18 94 % 1.626 m (5' 4\") 80.5 kg (177 lb 7.5 oz)   07/20/25 1930 142/52 -- -- 73 15 95 % -- --   07/20/25 1721 104/75 37 °C (98.6 °F) Oral 77 18 100 % 1.626 m (5' 4\") 75 kg (165 lb 5.5 oz)       A medically appropriate exam performed, outlined above, given the known history and presentation.    EKG/Cardiac monitor:   If EKG was done and, it was interpreted by attending physician, see their note for ED course for more detail.    Medications given during visit:  Medications   heparin (porcine) injection 5,000 Units (has no administration in time range)   acetaminophen (Tylenol) tablet 650 mg (has no administration in time range)     Or   acetaminophen (Tylenol) oral liquid 650 mg (has no administration in time range)     Or   acetaminophen (Tylenol) suppository 650 mg (has no administration in time range)   ondansetron ODT (Zofran-ODT) disintegrating tablet 4 mg (has no administration in time range)     Or   ondansetron (Zofran) injection 4 mg (has no administration in time range)   melatonin tablet 3 mg (has no administration in time range)   benzocaine-menthol (Cepastat Sore Throat) lozenge 1 lozenge (has no administration in time range)   morphine injection 2 mg (has no administration in time range)   morphine injection 4 mg (4 mg intravenous Given 7/20/25 2011)   aspirin EC tablet 81 mg ( oral Dose Auto Held 7/24/25 0900)   atorvastatin (Lipitor) tablet 20 mg (has no administration in time range)   busPIRone (Buspar) tablet 10 mg (has no administration in time range)   cetirizine (ZyrTEC) tablet 10 mg (has no administration in time range)   citalopram (CeleXA) tablet 40 mg (has no administration in time range)   docusate sodium (Colace) capsule 100 mg (has no administration in time range) "   donepezil (Aricept) tablet 5 mg (has no administration in time range)   DULoxetine (Cymbalta) DR capsule 60 mg (has no administration in time range)   fluticasone (Flonase) nasal spray 2 spray (has no administration in time range)   losartan (Cozaar) tablet 100 mg (has no administration in time range)   OLANZapine (ZyPREXA) tablet 2.5 mg (has no administration in time range)   insulin lispro injection 0-10 Units (has no administration in time range)   lactated Ringer's infusion (has no administration in time range)   polyethylene glycol (Glycolax, Miralax) packet 17 g (17 g oral Not Given 7/20/25 2102)   lidocaine 4 % patch 1 patch (has no administration in time range)   ondansetron (Zofran) injection 4 mg (4 mg intravenous Given 7/20/25 1756)   ketorolac (Toradol) injection 15 mg (15 mg intravenous Given 7/20/25 1756)   morphine injection 4 mg (4 mg intravenous Given 7/20/25 1756)   sodium chloride 0.9 % bolus 1,000 mL (0 mL intravenous Stopped 7/20/25 1855)        Diagnostic/tests:  Labs Reviewed   CBC WITH AUTO DIFFERENTIAL - Abnormal       Result Value    WBC 5.2      nRBC 0.0      RBC 3.49 (*)     Hemoglobin 10.5 (*)     Hematocrit 33.8 (*)     MCV 97      MCH 30.1      MCHC 31.1 (*)     RDW 16.0 (*)     Platelets 166      Neutrophils % 62.9      Immature Granulocytes %, Automated 0.6      Lymphocytes % 23.7      Monocytes % 8.6      Eosinophils % 3.6      Basophils % 0.6      Neutrophils Absolute 3.30      Immature Granulocytes Absolute, Automated 0.03      Lymphocytes Absolute 1.24      Monocytes Absolute 0.45      Eosinophils Absolute 0.19      Basophils Absolute 0.03     COMPREHENSIVE METABOLIC PANEL - Abnormal    Glucose 135 (*)     Sodium 142      Potassium 4.1      Chloride 107      Bicarbonate 29      Anion Gap 10      Urea Nitrogen 21      Creatinine 1.21 (*)     eGFR 45 (*)     Calcium 9.2      Albumin 3.7      Alkaline Phosphatase 61      Total Protein 6.6      AST 15      Bilirubin, Total 0.4       ALT 11     POCT GLUCOSE - Abnormal    POCT Glucose 129 (*)    URINALYSIS WITH REFLEX CULTURE AND MICROSCOPIC    Narrative:     The following orders were created for panel order Urinalysis with Reflex Culture and Microscopic.  Procedure                               Abnormality         Status                     ---------                               -----------         ------                     Urinalysis with Reflex C...[[id number]]                                                 Extra Urine Gray Tube[[id number]]                                                         Please view results for these tests on the individual orders.   URINALYSIS WITH REFLEX CULTURE AND MICROSCOPIC   EXTRA URINE GRAY TUBE   CBC   BASIC METABOLIC PANEL   POCT GLUCOSE METER        XR hip right with pelvis when performed 2 or 3 views   Final Result   Intertrochanteric right hip fracture.        Signed by: Ovidio Hook 7/20/2025 5:31 PM   Dictation workstation:   [id number]             [facility] Summary:  X-ray shows right intertrochanteric hip fracture.  Orthopedic surgery consulted.  No significant lab abnormality.  Patient admitted to medicine for further management.      Procedure  Procedures       [1]   Past Medical History:  Diagnosis Date    Arthritis     Chronic kidney disease     Diabetes mellitus (Multi)     Foot pain, left     bunion turned to wound    Hypertension     Right knee pain     UTI (urinary tract infection)    [2]   Past Surgical History:  Procedure Laterality Date    HIP FRACTURE SURGERY Left     KNEE ARTHROPLASTY      KNEE SURGERY Left     replacement    KNEE SURGERY Right     arthritis removed    SHOULDER SURGERY Left     rotator cuff    TOTAL SHOULDER ARTHROPLASTY     [3]   Family History  Problem Relation Name Age of Onset    Heart disease Father      Cancer Father      Diabetes Paternal Grandfather     [4]   Social History  Tobacco Use    Smoking status: Former     Types: Cigarettes     Passive exposure: Never     Smokeless tobacco: Never   Vaping Use    Vaping status: Never Used   Substance Use Topics    Alcohol use: Never    Drug use: Never   [5]   Past Medical History:  Diagnosis Date    Arthritis     Chronic kidney disease     Diabetes mellitus (Multi)     Foot pain, left     bunion turned to wound    Hypertension     Right knee pain     UTI (urinary tract infection)    [6]   Past Surgical History:  Procedure Laterality Date    HIP FRACTURE SURGERY Left     KNEE ARTHROPLASTY      KNEE SURGERY Left     replacement    KNEE SURGERY Right     arthritis removed    SHOULDER SURGERY Left     rotator cuff    TOTAL SHOULDER ARTHROPLASTY     [7]   Social History  Tobacco Use    Smoking status: Former     Types: Cigarettes     Passive exposure: Never    Smokeless tobacco: Never   Vaping Use    Vaping status: Never Used   Substance Use Topics    Alcohol use: Never    Drug use: Never   [8]   Family History  Problem Relation Name Age of Onset    Heart disease Father      Cancer Father      Diabetes Paternal Grandfather     [9]   Allergies  Allergen Reactions    [place] Rash    Ibuprofen Other     Kidney dx    Metformin Other     Cause kidney pain        Brandon Perkins PA-C  07/20/25 9953

## 2025-07-21 ENCOUNTER — ANESTHESIA EVENT (OUTPATIENT)
Dept: OPERATING ROOM | Facility: HOSPITAL | Age: 80
End: 2025-07-21
Payer: MEDICARE

## 2025-07-21 ENCOUNTER — APPOINTMENT (OUTPATIENT)
Dept: RADIOLOGY | Facility: HOSPITAL | Age: 80
DRG: 481 | End: 2025-07-21
Payer: MEDICARE

## 2025-07-21 ENCOUNTER — ANESTHESIA (OUTPATIENT)
Dept: OPERATING ROOM | Facility: HOSPITAL | Age: 80
End: 2025-07-21
Payer: MEDICARE

## 2025-07-21 ENCOUNTER — APPOINTMENT (OUTPATIENT)
Dept: CARDIOLOGY | Facility: HOSPITAL | Age: 80
DRG: 481 | End: 2025-07-21
Payer: MEDICARE

## 2025-07-21 LAB
ANION GAP SERPL CALCULATED.3IONS-SCNC: 7 MMOL/L (ref 10–20)
APPEARANCE UR: CLEAR
BILIRUB UR STRIP.AUTO-MCNC: NEGATIVE MG/DL
BUN SERPL-MCNC: 23 MG/DL (ref 6–23)
CALCIUM SERPL-MCNC: 8.3 MG/DL (ref 8.6–10.3)
CAOX CRY #/AREA UR COMP ASSIST: NORMAL /HPF
CHLORIDE SERPL-SCNC: 111 MMOL/L (ref 98–107)
CO2 SERPL-SCNC: 29 MMOL/L (ref 21–32)
COLOR UR: YELLOW
CREAT SERPL-MCNC: 1.21 MG/DL (ref 0.5–1.05)
EGFRCR SERPLBLD CKD-EPI 2021: 45 ML/MIN/1.73M*2
ERYTHROCYTE [DISTWIDTH] IN BLOOD BY AUTOMATED COUNT: 15.9 % (ref 11.5–14.5)
GLUCOSE BLD MANUAL STRIP-MCNC: 137 MG/DL (ref 74–99)
GLUCOSE BLD MANUAL STRIP-MCNC: 86 MG/DL (ref 74–99)
GLUCOSE BLD MANUAL STRIP-MCNC: 94 MG/DL (ref 74–99)
GLUCOSE SERPL-MCNC: 103 MG/DL (ref 74–99)
GLUCOSE UR STRIP.AUTO-MCNC: NORMAL MG/DL
HCT VFR BLD AUTO: 27.6 % (ref 36–46)
HGB BLD-MCNC: 8.3 G/DL (ref 12–16)
KETONES UR STRIP.AUTO-MCNC: ABNORMAL MG/DL
LEUKOCYTE ESTERASE UR QL STRIP.AUTO: NEGATIVE
MCH RBC QN AUTO: 29.7 PG (ref 26–34)
MCHC RBC AUTO-ENTMCNC: 30.1 G/DL (ref 32–36)
MCV RBC AUTO: 99 FL (ref 80–100)
NITRITE UR QL STRIP.AUTO: NEGATIVE
NRBC BLD-RTO: 0 /100 WBCS (ref 0–0)
PH UR STRIP.AUTO: 7 [PH]
PLATELET # BLD AUTO: 124 X10*3/UL (ref 150–450)
POTASSIUM SERPL-SCNC: 4.3 MMOL/L (ref 3.5–5.3)
PROT UR STRIP.AUTO-MCNC: ABNORMAL MG/DL
RBC # BLD AUTO: 2.79 X10*6/UL (ref 4–5.2)
RBC # UR STRIP.AUTO: NEGATIVE MG/DL
RBC #/AREA URNS AUTO: NORMAL /HPF
SODIUM SERPL-SCNC: 143 MMOL/L (ref 136–145)
SP GR UR STRIP.AUTO: 1.03
SQUAMOUS #/AREA URNS AUTO: NORMAL /HPF
TRI-PHOS CRY #/AREA UR COMP ASSIST: NORMAL /HPF
UROBILINOGEN UR STRIP.AUTO-MCNC: ABNORMAL MG/DL
WBC # BLD AUTO: 5.3 X10*3/UL (ref 4.4–11.3)
WBC #/AREA URNS AUTO: NORMAL /HPF

## 2025-07-21 PROCEDURE — 36415 COLL VENOUS BLD VENIPUNCTURE: CPT | Performed by: INTERNAL MEDICINE

## 2025-07-21 PROCEDURE — 9420000001 HC RT PATIENT EDUCATION 5 MIN

## 2025-07-21 PROCEDURE — A4649 SURGICAL SUPPLIES: HCPCS | Performed by: ORTHOPAEDIC SURGERY

## 2025-07-21 PROCEDURE — 2500000001 HC RX 250 WO HCPCS SELF ADMINISTERED DRUGS (ALT 637 FOR MEDICARE OP): Performed by: NURSE PRACTITIONER

## 2025-07-21 PROCEDURE — 3600000004 HC OR TIME - INITIAL BASE CHARGE - PROCEDURE LEVEL FOUR: Performed by: ORTHOPAEDIC SURGERY

## 2025-07-21 PROCEDURE — 3700000001 HC GENERAL ANESTHESIA TIME - INITIAL BASE CHARGE: Performed by: ORTHOPAEDIC SURGERY

## 2025-07-21 PROCEDURE — 82947 ASSAY GLUCOSE BLOOD QUANT: CPT

## 2025-07-21 PROCEDURE — 2500000002 HC RX 250 W HCPCS SELF ADMINISTERED DRUGS (ALT 637 FOR MEDICARE OP, ALT 636 FOR OP/ED): Performed by: NURSE PRACTITIONER

## 2025-07-21 PROCEDURE — 2500000005 HC RX 250 GENERAL PHARMACY W/O HCPCS: Performed by: ORTHOPAEDIC SURGERY

## 2025-07-21 PROCEDURE — 81001 URINALYSIS AUTO W/SCOPE: CPT

## 2025-07-21 PROCEDURE — A27245 PR OPEN FIX INTER/SUBTROCH FX,IMPLNT: Performed by: NURSE ANESTHETIST, CERTIFIED REGISTERED

## 2025-07-21 PROCEDURE — C1713 ANCHOR/SCREW BN/BN,TIS/BN: HCPCS | Performed by: ORTHOPAEDIC SURGERY

## 2025-07-21 PROCEDURE — 99233 SBSQ HOSP IP/OBS HIGH 50: CPT | Performed by: NURSE PRACTITIONER

## 2025-07-21 PROCEDURE — 93005 ELECTROCARDIOGRAM TRACING: CPT

## 2025-07-21 PROCEDURE — 2780000003 HC OR 278 NO HCPCS: Performed by: ORTHOPAEDIC SURGERY

## 2025-07-21 PROCEDURE — 2500000005 HC RX 250 GENERAL PHARMACY W/O HCPCS: Performed by: NURSE ANESTHETIST, CERTIFIED REGISTERED

## 2025-07-21 PROCEDURE — 93010 ELECTROCARDIOGRAM REPORT: CPT | Performed by: INTERNAL MEDICINE

## 2025-07-21 PROCEDURE — 3700000002 HC GENERAL ANESTHESIA TIME - EACH INCREMENTAL 1 MINUTE: Performed by: ORTHOPAEDIC SURGERY

## 2025-07-21 PROCEDURE — 1100000001 HC PRIVATE ROOM DAILY

## 2025-07-21 PROCEDURE — 2500000004 HC RX 250 GENERAL PHARMACY W/ HCPCS (ALT 636 FOR OP/ED): Performed by: ORTHOPAEDIC SURGERY

## 2025-07-21 PROCEDURE — A27245 PR OPEN FIX INTER/SUBTROCH FX,IMPLNT: Performed by: ANESTHESIOLOGY

## 2025-07-21 PROCEDURE — 2720000007 HC OR 272 NO HCPCS: Performed by: ORTHOPAEDIC SURGERY

## 2025-07-21 PROCEDURE — C1769 GUIDE WIRE: HCPCS | Performed by: ORTHOPAEDIC SURGERY

## 2025-07-21 PROCEDURE — 85027 COMPLETE CBC AUTOMATED: CPT | Performed by: INTERNAL MEDICINE

## 2025-07-21 PROCEDURE — 76000 FLUOROSCOPY <1 HR PHYS/QHP: CPT

## 2025-07-21 PROCEDURE — 3600000009 HC OR TIME - EACH INCREMENTAL 1 MINUTE - PROCEDURE LEVEL FOUR: Performed by: ORTHOPAEDIC SURGERY

## 2025-07-21 PROCEDURE — 80048 BASIC METABOLIC PNL TOTAL CA: CPT | Performed by: INTERNAL MEDICINE

## 2025-07-21 PROCEDURE — 2500000005 HC RX 250 GENERAL PHARMACY W/O HCPCS: Performed by: INTERNAL MEDICINE

## 2025-07-21 PROCEDURE — 99100 ANES PT EXTEME AGE<1 YR&>70: CPT | Performed by: ANESTHESIOLOGY

## 2025-07-21 PROCEDURE — 7100000002 HC RECOVERY ROOM TIME - EACH INCREMENTAL 1 MINUTE: Performed by: ORTHOPAEDIC SURGERY

## 2025-07-21 PROCEDURE — 2500000004 HC RX 250 GENERAL PHARMACY W/ HCPCS (ALT 636 FOR OP/ED): Performed by: INTERNAL MEDICINE

## 2025-07-21 PROCEDURE — 2500000002 HC RX 250 W HCPCS SELF ADMINISTERED DRUGS (ALT 637 FOR MEDICARE OP, ALT 636 FOR OP/ED): Performed by: INTERNAL MEDICINE

## 2025-07-21 PROCEDURE — 7100000001 HC RECOVERY ROOM TIME - INITIAL BASE CHARGE: Performed by: ORTHOPAEDIC SURGERY

## 2025-07-21 PROCEDURE — 0QSB06Z REPOSITION RIGHT LOWER FEMUR WITH INTRAMEDULLARY INTERNAL FIXATION DEVICE, OPEN APPROACH: ICD-10-PCS | Performed by: ORTHOPAEDIC SURGERY

## 2025-07-21 PROCEDURE — 2500000004 HC RX 250 GENERAL PHARMACY W/ HCPCS (ALT 636 FOR OP/ED): Mod: JW | Performed by: NURSE ANESTHETIST, CERTIFIED REGISTERED

## 2025-07-21 DEVICE — IMPLANTABLE DEVICE: Type: IMPLANTABLE DEVICE | Site: HIP | Status: FUNCTIONAL

## 2025-07-21 DEVICE — SCREW, LOCKING 5.0MM X 34MM TI STERILE: Type: IMPLANTABLE DEVICE | Site: HIP | Status: FUNCTIONAL

## 2025-07-21 DEVICE — NAIL, TFN ADV SHORT, 170MML, DIAMETER 10, 130 DEG: Type: IMPLANTABLE DEVICE | Site: HIP | Status: FUNCTIONAL

## 2025-07-21 RX ORDER — ONDANSETRON 4 MG/1
4 TABLET, ORALLY DISINTEGRATING ORAL ONCE AS NEEDED
Status: ACTIVE | OUTPATIENT
Start: 2025-07-21 | End: 2025-07-21

## 2025-07-21 RX ORDER — PHENYLEPHRINE HCL IN 0.9% NACL 1 MG/10 ML
SYRINGE (ML) INTRAVENOUS AS NEEDED
Status: DISCONTINUED | OUTPATIENT
Start: 2025-07-21 | End: 2025-07-21

## 2025-07-21 RX ORDER — ALBUTEROL SULFATE 0.83 MG/ML
2.5 SOLUTION RESPIRATORY (INHALATION) ONCE
Status: CANCELLED | OUTPATIENT
Start: 2025-07-21 | End: 2025-07-21

## 2025-07-21 RX ORDER — LIDOCAINE HYDROCHLORIDE 20 MG/ML
INJECTION, SOLUTION INFILTRATION; PERINEURAL AS NEEDED
Status: DISCONTINUED | OUTPATIENT
Start: 2025-07-21 | End: 2025-07-21

## 2025-07-21 RX ORDER — GLYCOPYRROLATE 0.2 MG/ML
0.2 INJECTION INTRAMUSCULAR; INTRAVENOUS ONCE
Status: DISCONTINUED | OUTPATIENT
Start: 2025-07-21 | End: 2025-07-21 | Stop reason: HOSPADM

## 2025-07-21 RX ORDER — POLYETHYLENE GLYCOL 3350 17 G/17G
17 POWDER, FOR SOLUTION ORAL DAILY
Status: DISCONTINUED | OUTPATIENT
Start: 2025-07-21 | End: 2025-07-25 | Stop reason: HOSPADM

## 2025-07-21 RX ORDER — LABETALOL HYDROCHLORIDE 5 MG/ML
10 INJECTION, SOLUTION INTRAVENOUS ONCE AS NEEDED
Status: DISCONTINUED | OUTPATIENT
Start: 2025-07-21 | End: 2025-07-21 | Stop reason: HOSPADM

## 2025-07-21 RX ORDER — ONDANSETRON HYDROCHLORIDE 2 MG/ML
4 INJECTION, SOLUTION INTRAVENOUS EVERY 8 HOURS PRN
Status: ACTIVE | OUTPATIENT
Start: 2025-07-21 | End: 2025-07-21

## 2025-07-21 RX ORDER — ASPIRIN 325 MG
325 TABLET, DELAYED RELEASE (ENTERIC COATED) ORAL 2 TIMES DAILY
Status: DISCONTINUED | OUTPATIENT
Start: 2025-07-21 | End: 2025-07-25 | Stop reason: HOSPADM

## 2025-07-21 RX ORDER — HYDROCODONE BITARTRATE AND ACETAMINOPHEN 5; 325 MG/1; MG/1
1 TABLET ORAL EVERY 4 HOURS PRN
Status: DISCONTINUED | OUTPATIENT
Start: 2025-07-21 | End: 2025-07-25 | Stop reason: HOSPADM

## 2025-07-21 RX ORDER — LIDOCAINE HYDROCHLORIDE 10 MG/ML
0.1 INJECTION, SOLUTION INFILTRATION; PERINEURAL ONCE
Status: DISCONTINUED | OUTPATIENT
Start: 2025-07-21 | End: 2025-07-21 | Stop reason: HOSPADM

## 2025-07-21 RX ORDER — HYDRALAZINE HYDROCHLORIDE 20 MG/ML
10 INJECTION INTRAMUSCULAR; INTRAVENOUS EVERY 30 MIN PRN
Status: DISCONTINUED | OUTPATIENT
Start: 2025-07-21 | End: 2025-07-21 | Stop reason: HOSPADM

## 2025-07-21 RX ORDER — PROPOFOL 10 MG/ML
INJECTION, EMULSION INTRAVENOUS AS NEEDED
Status: DISCONTINUED | OUTPATIENT
Start: 2025-07-21 | End: 2025-07-21

## 2025-07-21 RX ORDER — TRAMADOL HYDROCHLORIDE 50 MG/1
50 TABLET, FILM COATED ORAL EVERY 8 HOURS PRN
Qty: 10 TABLET | Refills: 0 | OUTPATIENT
Start: 2025-07-21

## 2025-07-21 RX ORDER — ACETAMINOPHEN 325 MG/1
650 TABLET ORAL EVERY 6 HOURS SCHEDULED
Status: DISCONTINUED | OUTPATIENT
Start: 2025-07-21 | End: 2025-07-25 | Stop reason: HOSPADM

## 2025-07-21 RX ORDER — ALBUTEROL SULFATE 0.83 MG/ML
2.5 SOLUTION RESPIRATORY (INHALATION) ONCE AS NEEDED
Status: DISCONTINUED | OUTPATIENT
Start: 2025-07-21 | End: 2025-07-21 | Stop reason: HOSPADM

## 2025-07-21 RX ORDER — DIPHENHYDRAMINE HYDROCHLORIDE 50 MG/ML
12.5 INJECTION, SOLUTION INTRAMUSCULAR; INTRAVENOUS ONCE AS NEEDED
Status: DISCONTINUED | OUTPATIENT
Start: 2025-07-21 | End: 2025-07-21 | Stop reason: HOSPADM

## 2025-07-21 RX ORDER — SODIUM CHLORIDE 9 MG/ML
80 INJECTION, SOLUTION INTRAVENOUS CONTINUOUS
Status: ACTIVE | OUTPATIENT
Start: 2025-07-21 | End: 2025-07-23

## 2025-07-21 RX ORDER — MIDAZOLAM HYDROCHLORIDE 1 MG/ML
1 INJECTION, SOLUTION INTRAMUSCULAR; INTRAVENOUS ONCE AS NEEDED
Status: DISCONTINUED | OUTPATIENT
Start: 2025-07-21 | End: 2025-07-21 | Stop reason: HOSPADM

## 2025-07-21 RX ORDER — NORETHINDRONE AND ETHINYL ESTRADIOL 0.5-0.035
10 KIT ORAL EVERY 5 MIN PRN
Status: DISCONTINUED | OUTPATIENT
Start: 2025-07-21 | End: 2025-07-21 | Stop reason: HOSPADM

## 2025-07-21 RX ORDER — ONDANSETRON HYDROCHLORIDE 2 MG/ML
4 INJECTION, SOLUTION INTRAVENOUS ONCE AS NEEDED
Status: DISCONTINUED | OUTPATIENT
Start: 2025-07-21 | End: 2025-07-21 | Stop reason: HOSPADM

## 2025-07-21 RX ORDER — FENTANYL CITRATE 50 UG/ML
INJECTION, SOLUTION INTRAMUSCULAR; INTRAVENOUS AS NEEDED
Status: DISCONTINUED | OUTPATIENT
Start: 2025-07-21 | End: 2025-07-21

## 2025-07-21 RX ORDER — CEFAZOLIN SODIUM 2 G/50ML
2 SOLUTION INTRAVENOUS EVERY 8 HOURS
Status: COMPLETED | OUTPATIENT
Start: 2025-07-21 | End: 2025-07-22

## 2025-07-21 RX ORDER — ASPIRIN 81 MG/1
81 TABLET ORAL 2 TIMES DAILY
Qty: 60 TABLET | OUTPATIENT
Start: 2025-07-21

## 2025-07-21 RX ORDER — TRANEXAMIC ACID 1 G/10ML
INJECTION, SOLUTION INTRAVENOUS AS NEEDED
Status: DISCONTINUED | OUTPATIENT
Start: 2025-07-21 | End: 2025-07-21

## 2025-07-21 RX ORDER — NALOXONE HYDROCHLORIDE 0.4 MG/ML
0.2 INJECTION, SOLUTION INTRAMUSCULAR; INTRAVENOUS; SUBCUTANEOUS EVERY 5 MIN PRN
Status: DISCONTINUED | OUTPATIENT
Start: 2025-07-21 | End: 2025-07-25 | Stop reason: HOSPADM

## 2025-07-21 RX ORDER — NORETHINDRONE AND ETHINYL ESTRADIOL 0.5-0.035
50 KIT ORAL ONCE AS NEEDED
Status: ACTIVE | OUTPATIENT
Start: 2025-07-21 | End: 2025-07-21

## 2025-07-21 RX ORDER — HYDROMORPHONE HYDROCHLORIDE 0.2 MG/ML
0.1 INJECTION INTRAMUSCULAR; INTRAVENOUS; SUBCUTANEOUS EVERY 5 MIN PRN
Status: DISCONTINUED | OUTPATIENT
Start: 2025-07-21 | End: 2025-07-21 | Stop reason: HOSPADM

## 2025-07-21 RX ORDER — ASPIRIN 81 MG/1
81 TABLET ORAL 2 TIMES DAILY
Qty: 60 TABLET | Refills: 0 | OUTPATIENT
Start: 2025-07-21 | End: 2025-08-20

## 2025-07-21 RX ORDER — CEFAZOLIN SODIUM 2 G/50ML
2 SOLUTION INTRAVENOUS ONCE
Status: COMPLETED | OUTPATIENT
Start: 2025-07-21 | End: 2025-07-21

## 2025-07-21 RX ORDER — DOCUSATE SODIUM 100 MG/1
100 CAPSULE, LIQUID FILLED ORAL 2 TIMES DAILY
Status: DISCONTINUED | OUTPATIENT
Start: 2025-07-21 | End: 2025-07-25 | Stop reason: HOSPADM

## 2025-07-21 RX ADMIN — LOSARTAN POTASSIUM 100 MG: 100 TABLET, FILM COATED ORAL at 15:54

## 2025-07-21 RX ADMIN — BUSPIRONE HYDROCHLORIDE 10 MG: 10 TABLET ORAL at 20:09

## 2025-07-21 RX ADMIN — CETIRIZINE HYDROCHLORIDE 10 MG: 10 TABLET, FILM COATED ORAL at 20:09

## 2025-07-21 RX ADMIN — ACETAMINOPHEN 650 MG: 325 TABLET ORAL at 15:54

## 2025-07-21 RX ADMIN — CEFAZOLIN SODIUM 2 G: 2 SOLUTION INTRAVENOUS at 20:08

## 2025-07-21 RX ADMIN — LIDOCAINE HYDROCHLORIDE 2 ML: 20 INJECTION, SOLUTION INFILTRATION; PERINEURAL at 12:27

## 2025-07-21 RX ADMIN — ASPIRIN 325 MG: 325 TABLET, COATED ORAL at 15:55

## 2025-07-21 RX ADMIN — SODIUM CHLORIDE 80 ML/HR: 900 INJECTION, SOLUTION INTRAVENOUS at 15:31

## 2025-07-21 RX ADMIN — ONDANSETRON 4 MG: 2 INJECTION, SOLUTION INTRAMUSCULAR; INTRAVENOUS at 13:18

## 2025-07-21 RX ADMIN — PROPOFOL 100 MG: 10 INJECTION, EMULSION INTRAVENOUS at 12:27

## 2025-07-21 RX ADMIN — Medication 2 L/MIN: at 15:00

## 2025-07-21 RX ADMIN — OLANZAPINE 2.5 MG: 2.5 TABLET, FILM COATED ORAL at 20:09

## 2025-07-21 RX ADMIN — CEFAZOLIN SODIUM 2 G: 2 SOLUTION INTRAVENOUS at 12:30

## 2025-07-21 RX ADMIN — FENTANYL CITRATE 25 MCG: 50 INJECTION, SOLUTION INTRAMUSCULAR; INTRAVENOUS at 13:39

## 2025-07-21 RX ADMIN — FLUTICASONE PROPIONATE 2 SPRAY: 50 SPRAY, METERED NASAL at 09:02

## 2025-07-21 RX ADMIN — FENTANYL CITRATE 25 MCG: 50 INJECTION, SOLUTION INTRAMUSCULAR; INTRAVENOUS at 12:34

## 2025-07-21 RX ADMIN — DULOXETINE 60 MG: 60 CAPSULE, DELAYED RELEASE ORAL at 16:01

## 2025-07-21 RX ADMIN — LIDOCAINE 4% 1 PATCH: 40 PATCH TOPICAL at 09:05

## 2025-07-21 RX ADMIN — PSYLLIUM HUSK 1 PACKET: 3.4 POWDER ORAL at 15:58

## 2025-07-21 RX ADMIN — SODIUM CHLORIDE, SODIUM LACTATE, POTASSIUM CHLORIDE, AND CALCIUM CHLORIDE 75 ML/HR: 600; 310; 30; 20 INJECTION, SOLUTION INTRAVENOUS at 11:15

## 2025-07-21 RX ADMIN — TRANEXAMIC ACID 2000 MG: 100 INJECTION, SOLUTION INTRAVENOUS at 12:32

## 2025-07-21 RX ADMIN — ASPIRIN 325 MG: 325 TABLET, COATED ORAL at 20:09

## 2025-07-21 RX ADMIN — ATORVASTATIN CALCIUM 20 MG: 20 TABLET, FILM COATED ORAL at 20:09

## 2025-07-21 RX ADMIN — MORPHINE SULFATE 2 MG: 2 INJECTION, SOLUTION INTRAMUSCULAR; INTRAVENOUS at 10:26

## 2025-07-21 RX ADMIN — DOCUSATE SODIUM 100 MG: 100 CAPSULE, LIQUID FILLED ORAL at 20:09

## 2025-07-21 RX ADMIN — Medication 100 MCG: at 13:22

## 2025-07-21 RX ADMIN — FENTANYL CITRATE 50 MCG: 50 INJECTION, SOLUTION INTRAMUSCULAR; INTRAVENOUS at 13:00

## 2025-07-21 SDOH — HEALTH STABILITY: MENTAL HEALTH: CURRENT SMOKER: 0

## 2025-07-21 ASSESSMENT — COGNITIVE AND FUNCTIONAL STATUS - GENERAL
DRESSING REGULAR LOWER BODY CLOTHING: A LOT
HELP NEEDED FOR BATHING: A LOT
MOVING TO AND FROM BED TO CHAIR: A LOT
TOILETING: A LOT
MOVING FROM LYING ON BACK TO SITTING ON SIDE OF FLAT BED WITH BEDRAILS: A LOT
DRESSING REGULAR UPPER BODY CLOTHING: A LOT
STANDING UP FROM CHAIR USING ARMS: TOTAL
CLIMB 3 TO 5 STEPS WITH RAILING: TOTAL
PERSONAL GROOMING: A LITTLE
TURNING FROM BACK TO SIDE WHILE IN FLAT BAD: A LOT
WALKING IN HOSPITAL ROOM: TOTAL
DAILY ACTIVITIY SCORE: 15
MOBILITY SCORE: 9

## 2025-07-21 ASSESSMENT — PAIN SCALES - GENERAL
PAINLEVEL_OUTOF10: 5 - MODERATE PAIN
PAINLEVEL_OUTOF10: 0 - NO PAIN
PAINLEVEL_OUTOF10: 9
PAINLEVEL_OUTOF10: 0 - NO PAIN
PAIN_LEVEL: 0
PAINLEVEL_OUTOF10: 0 - NO PAIN

## 2025-07-21 ASSESSMENT — PAIN DESCRIPTION - DESCRIPTORS
DESCRIPTORS: ACHING
DESCRIPTORS: ACHING

## 2025-07-21 ASSESSMENT — PAIN DESCRIPTION - LOCATION: LOCATION: HIP

## 2025-07-21 ASSESSMENT — PAIN DESCRIPTION - ORIENTATION: ORIENTATION: RIGHT

## 2025-07-21 NOTE — ASSESSMENT & PLAN NOTE
Stable.  With the patient's bicarbonate at 29, will hold off on further supplementation and get daily labs.

## 2025-07-21 NOTE — CARE PLAN
The patient's goals for the shift include rest    The clinical goals for the shift include pain control

## 2025-07-21 NOTE — PROGRESS NOTES
Pt off the floor for surgery this afternoon. Pts  told the attending they would like the pt to go to [facility] [place]. Attempt made to call pts  and the call would not go through. Referral will be sent to [facility] once therapy notes are available. No precert needed however pt would not be able to admit to any facility until on or after 7/23.      07/21/25 5696   Discharge Planning   Expected Discharge Disposition O[facility]

## 2025-07-21 NOTE — ASSESSMENT & PLAN NOTE
N.p.o. for surgery today at 1230 hrs.  Pain control with morphine as needed  LR at 75 an hour  Orthopedics following  PT/OT after surgery  Care management for placement  Type 2 diabetes mellitus  Hypoglycemia protocol  Accu-Cheks AC and at bedtime  Insulin sliding scale  Chronic kidney disease  Currently stable, creatinine 1.24  DVT prophylaxis  Sequential compression

## 2025-07-21 NOTE — ASSESSMENT & PLAN NOTE
N.p.o. at midnight with sips of water  Morphine 2 and 4 mg as needed for pain.  Lidocaine patch.  Will start on lactated Ringer's while n.p.o. 75 mL/h  Orthopedic consult

## 2025-07-21 NOTE — ANESTHESIA PROCEDURE NOTES
Airway  Date/Time: 7/21/2025 12:35 PM  Reason: elective      Staffing  Performed: CRNA   Authorized by: Sudheer Willams MD    Performed by: CANDIDA Aguila-DIALLO  Patient location during procedure: OR    Patient Condition  Indications for airway management: anesthesia  Sedation level: deep     Final Airway Details   Preoxygenated: yes  Final airway type: supraglottic airway  Successful airway:   Size: 4  Number of attempts at approach: 2

## 2025-07-21 NOTE — NURSING NOTE
Patient returned back to the unit from surgery at 1450. No c/o voiced. VS stable. Call light within reach. Bed in lowest position and bed alarm on.

## 2025-07-21 NOTE — OP NOTE
ORIF, FRACTURE, FEMUR, PROXIMAL, USING TROCHANTERIC FIXATION NAIL (R) Operative Note     Date: 2025 - 2025  OR Location: [facility]    Name: Adriana Duran, : 1945, Age: 80 y.o., MRN: [id number], Sex: female    Diagnosis  Pre-op Diagnosis      * Closed displaced intertrochanteric fracture of right femur, initial encounter [S72.141A] Post-op Diagnosis     * Closed displaced intertrochanteric fracture of right femur, initial encounter [S72.141A]     Procedures  ORIF, FRACTURE, FEMUR, PROXIMAL, USING TROCHANTERIC FIXATION NAIL  49413 - MI TX INTER/MI/SUBTRCHNTRIC FEM FX IMED IMPLTSCREW      Surgeons      * Maykel Wu - Primary    Resident/Fellow/Other Assistant:  Surgeons and Role:  * No surgeons found with a matching role *    Staff:   Relief Circulator: Lillian  Circulator: Angelica Luna Person: Michelle  Surgical Assistant: Dex    Anesthesia Staff: Anesthesiologist: Sudheer Willams MD  CRNA: CANDIDA Aguila-CRNA    Procedure Summary  Anesthesia: Anesthesia type not filed in the log.  ASA: III  Estimated Blood Loss: 150 mL  Intra-op Medications:   Administrations occurring from 1230 to 1430 on 25:   Medication Name Total Dose   fentaNYL (Sublimaze) injection 50 mcg/mL 75 mcg   phenylephrine 100 mcg/mL syringe 10 mL (prefilled) 100 mcg   tranexamic acid (Cyklokapron) injection 2,000 mg   ceFAZolin (Ancef) 2 g in dextrose (iso) IV 50 mL 2 g              Anesthesia Record               Intraprocedure I/O Totals          Intake    Tranexamic Acid 0.00 mL    The total shown is the total volume documented since Anesthesia Start was filed.    Total Intake 0 mL       Output    Est. Blood Loss 25 mL    Total Output 25 mL       Net    Net Volume -25 mL          Specimen: No specimens collected              Drains and/or Catheters:   External Urinary Catheter Female (Active)   Output (mL) 300 mL 25 1154       Tourniquet Times:         Implants:  Implants       Type Name Action Serial No.       Joint Hip NAIL, TFN ADV SHORT, 170MML, DIAMETER 10, 130 DEG - [id number] Implanted      Screw SCREW, TFNA, 105MM, STERILE - [id number] Implanted      Screw SCREW, LOCKING 5.0MM X 34MM TI STERILE - [id number] Implanted               Findings: Displaced right intertrochanteric hip fracture    Indications: Adriana Duran is an 80 y.o. female who is having surgery for Closed displaced intertrochanteric fracture of right femur, initial encounter [S72.141A].     The patient was seen in the preoperative area. The risks, benefits, complications, treatment options, non-operative alternatives, expected recovery and outcomes were discussed with the patient. The possibilities of reaction to medication, pulmonary aspiration, injury to surrounding structures, bleeding, recurrent infection, the need for additional procedures, failure to diagnose a condition, and creating a complication requiring transfusion or operation were discussed with the patient. The patient concurred with the proposed plan, giving informed consent.  The site of surgery was properly noted/marked if necessary per policy. The patient has been actively warmed in preoperative area. Preoperative antibiotics have been ordered and given within 1 hours of incision. Venous thrombosis prophylaxis have been ordered including unilateral sequential compression device    Procedure Details: Patient was seen in the preoperative holding area and the proper surgical site was identified and marked.  She was then brought to the operating room and placed supine on the fracture table.  After induction of satisfactory anesthesia and administration of prophylactic antibiotics a time-out was called.  We ensured the ability to obtain excellent reduction of this fracture prior to prep and drape using biplanar x-ray.  The patient was then prepped and draped in a standard sterile fashion.    A short incision was created just proximal to the greater trochanter and taken down  through the subcutaneous tissue and underlying fascia.  I identified the proper starting point for our guide pin at the tip of the greater trochanter then placed the guide pin to the appropriate depth.  I over reamed per protocol proximally and then inserted our 10 millimeter/170 millimeter nail to the appropriate depth.  I demonstrated excellent position of our nail and then secured the construct proximally with a 105 millimeter lag screw and distally with a single interlocking screw.  I again confirmed excellent position of our hardware and fracture alignment.  I then irrigated the wounds copiously with normal saline.  I closed the wounds in a standard layered fashion with 0 Vicryl followed by 2 Vicryl suture.  The skin was approximate with metal clips.  Sterile bandage was applied.  The patient will from anesthesia and transferred recovery room in stable condition.  Evidence of Infection: No   Complications:  None; patient tolerated the procedure well.    Disposition: PACU - hemodynamically stable.  Condition: stable         Task Performed by RNFA or Surgical Assistant:  Retraction and wound closure          Additional Details:     Attending Attestation:     Maykel Wu  Phone Number: [phone]

## 2025-07-21 NOTE — PROGRESS NOTES
Adriana Duran is a 80 y.o. female on day 1 of admission presenting with Closed displaced intertrochanteric fracture of right femur, initial encounter.      Subjective   Patient examined lying supine in bed with  at bedside.  Patient states that she is having pain in her right hip that is controlled well with current pain control regimen.   states that they would like to go to [facility] for rehab if that is possible.       Objective     Last Recorded Vitals  /53 (BP Location: Left arm, Patient Position: Lying)   Pulse 83   Temp 36.5 °C (97.7 °F) (Oral)   Resp 17   Wt 81.2 kg (179 lb 0.2 oz)   SpO2 96%   Intake/Output last 3 Shifts:    Intake/Output Summary (Last 24 hours) at 7/21/2025 1010  Last data filed at 7/21/2025 0756  Gross per 24 hour   Intake 1729 ml   Output 300 ml   Net 1429 ml       Admission Weight  Weight: 75 kg (165 lb 5.5 oz) (07/20/25 1721)    Daily Weight  07/21/25 : 81.2 kg (179 lb 0.2 oz)          Physical Exam    Constitutional: No acute distress, calm, cooperative  HEENT: PERRL, normocephalic, atraumatic, mucous membranes dry  Cardiovascular: Regular rhythm and rate,   Respiratory: Lungs clear to auscultation,   Gastrointestinal: Bowel sounds positive x 4, soft, nontender  Neurologic: Alert and oriented x 2-3 equal strength bilaterally  Musculoskeletal: Right hip fracture  Skin: Warm, dry and intact                            Assessment & Plan  Closed displaced intertrochanteric fracture of right femur, initial encounter  N.p.o. for surgery today at 1230 hrs.  Pain control with morphine as needed  LR at 75 an hour  Orthopedics following  PT/OT after surgery  Care management for placement  Type 2 diabetes mellitus  Hypoglycemia protocol  Accu-Cheks AC and at bedtime  Insulin sliding scale  Chronic kidney disease  Currently stable, creatinine 1.24  DVT prophylaxis  Sequential compression             Dionte Farley, APRN-CNP

## 2025-07-21 NOTE — NURSING NOTE
Pt asleep. No s/s of pain/discomfort noted. Pt is NPO for possible surgery later today. No change noted from initial shift assessment. Call light in reach. Pure wick in place. Will continue to monitor.

## 2025-07-21 NOTE — ANESTHESIA PREPROCEDURE EVALUATION
Patient: Adriana Duran    Procedure Information       Date/Time: 07/21/25 8598    Procedure: ORIF, FRACTURE, FEMUR, PROXIMAL, USING TROCHANTERIC FIXATION NAIL (Right: Hip)    Location: [facility] / [facility]    Surgeons: Maykel Wu MD            Relevant Problems   Anesthesia (within normal limits)      Cardiac   (+) HTN (hypertension), benign   (+) Mixed hyperlipidemia      Pulmonary (within normal limits)      Neuro   (+) Anxiety   (+) Diabetic peripheral neuropathy (Multi)   (+) Recurrent major depressive disorder      GI (within normal limits)      /Renal   (+) Acute lower urinary tract infection   (+) Urinary tract infectious disease      Liver   (+) Biliary calculus   (+) Common bile duct calculus      Endocrine   (+) Controlled type 2 diabetes mellitus with complication, without long-term current use of insulin (Multi)   (+) Diabetic nephropathy with proteinuria (Multi)   (+) Diabetic peripheral neuropathy (Multi)   (+) Type 2 diabetes mellitus      Hematology   (+) Anemia   (+) Anemia due to acute blood loss   (+) Iron deficiency anemia      Musculoskeletal   (+) Osteoarthritis   (+) Osteoarthritis of knee   (+) Osteoarthritis of left knee   (+) Osteoarthritis of right knee      HEENT   (+) Sinusitis, acute frontal      ID   (+) Acute lower urinary tract infection   (+) Osteomyelitis of great toe of left foot (Multi)   (+) Scabies   (+) Urinary tract infectious disease   (+) Viral upper respiratory tract infection      Skin (within normal limits)      GYN (within normal limits)       Clinical information reviewed:   Tobacco  Allergies  Meds  Problems  Med Hx  Surg Hx   Fam Hx  Soc   Hx      Vitals:    07/21/25 0756   BP: 128/53   Pulse: 83   Resp: 17   Temp: 36.5 °C (97.7 °F)   SpO2: 96%       Surgical History[1]  Medical History[2]  Current Medications[3]  Prior to Admission medications   Medication Sig Start Date End Date Taking? Authorizing Provider   acetaminophen (Tylenol) 325 mg  tablet Take 2 tablets (650 mg) by mouth every 6 hours if needed for moderate pain (4 - 6). 9/25/24   JARROD Serrano   aspirin (Adult Low Dose Aspirin) 81 mg EC tablet Take 1 tablet (81 mg) by mouth once daily.    Historical Provider, MD   atorvastatin (Lipitor) 20 mg tablet TAKE 1 TABLET BY MOUTH ONCE  DAILY 2/24/25   Tawanda Joel PA-C   busPIRone (Buspar) 10 mg tablet Take 1 tablet (10 mg) by mouth 3 times a day. 3/17/25   Tawanda Joel PA-C   cetirizine (ZyrTEC) 10 mg tablet Take 1 tablet (10 mg) by mouth once daily. 4/25/25 10/22/25  Tawanda Joel PA-C   citalopram (CeleXA) 40 mg tablet Take 1 tablet (40 mg) by mouth once daily.    Historical Provider, MD   cyanocobalamin (Vitamin B-12) 1,000 mcg tablet Take 100 mcg by mouth once daily.    Historical Provider, MD   docusate sodium (Colace) 100 mg capsule Take 1 capsule (100 mg) by mouth 2 times a day as needed for constipation.    Historical Provider, MD   donepezil (Aricept) 5 mg tablet Take 1 tablet (5 mg) by mouth once daily at bedtime. 3/27/25 9/23/25  Tawanda Joel PA-C   DULoxetine (Cymbalta) 60 mg DR capsule Take 1 capsule (60 mg) by mouth once daily. Do not crush or chew. 5/8/25   Tawanda Joel PA-C   fluticasone (Flonase) 50 mcg/actuation nasal spray Administer 2 sprays into each nostril once daily. Shake gently. Before first use, prime pump. After use, clean tip and replace cap. 9/26/24   JARROD Serrano   losartan (Cozaar) 100 mg tablet Take 1 tablet (100 mg) by mouth once daily. 3/20/25   Tawanda Joel PA-C   multivitamin tablet Take 1 tablet by mouth once daily.    Historical Provider, MD   mupirocin (Bactroban) 2 % ointment Apply topically 3 times a day for 10 days. apply to affected area 6/3/25 8/8/25  Tawanda Joel PA-C   OLANZapine (ZyPREXA) 2.5 mg tablet Take 1 tablet (2.5 mg) by mouth once daily at bedtime. 3/27/25 9/23/25  Tawanda Joel PA-C   phenylephrine (Faisal-Synephrine) 0.25 % nasal spray Administer 2 sprays into  each nostril 3 times a day for 9 doses. Do not use for more than 3 days. 9/25/24 5/8/25  Orsolya Lisa, APRN-CNP   sodium bicarbonate 650 mg tablet Take 1 tablet (650 mg) by mouth 3 times a day.    Historical Provider, MD   solifenacin (VESIcare) 10 mg tablet Take 1 tablet (10 mg) by mouth once daily. Swallow tablet whole; do not crush, chew, or split. 1/24/25 1/19/26  Rafael Ruiz MD     RX Allergies[4]  Social History     Tobacco Use    Smoking status: Former     Types: Cigarettes     Passive exposure: Never    Smokeless tobacco: Never   Substance Use Topics    Alcohol use: Never         Chemistry    Lab Results   Component Value Date/Time     07/21/2025 0550    K 4.3 07/21/2025 0550     (H) 07/21/2025 0550    CO2 29 07/21/2025 0550    BUN 23 07/21/2025 0550    CREATININE 1.21 (H) 07/21/2025 0550    Lab Results   Component Value Date/Time    CALCIUM 8.3 (L) 07/21/2025 0550    ALKPHOS 61 07/20/2025 1728    AST 15 07/20/2025 1728    ALT 11 07/20/2025 1728    BILITOT 0.4 07/20/2025 1728          Lab Results   Component Value Date/Time    WBC 5.3 07/21/2025 0550    HGB 8.3 (L) 07/21/2025 0550    HCT 27.6 (L) 07/21/2025 0550     (L) 07/21/2025 0550     Lab Results   Component Value Date/Time    PROTIME 10.9 12/28/2023 2354    INR 1.0 12/28/2023 2354     Encounter Date: 07/20/25   Electrocardiogram, 12-lead PRN ACS symptoms   Result Value    Ventricular Rate 86    Atrial Rate 86    WI Interval 178    QRS Duration 96    QT Interval 362    QTC Calculation(Bazett) 433    P Axis 83    R Axis -35    T Axis 62    QRS Count 14    Q Onset 213    P Onset 124    P Offset 162    T Offset 394    QTC Fredericia 408    Narrative    Sinus rhythm with Premature atrial complexes  Left axis deviation  Abnormal ECG  When compared with ECG of 29-DEC-2023 07:17,  Premature ventricular complexes are no longer Present  QRS axis Shifted right  Nonspecific T wave abnormality no longer evident in Inferior leads       NPO  Detail:  No data recorded     Physical Exam    Airway  Mallampati: II  TM distance: >3 FB  Neck ROM: full  Mouth opening: 3 or more finger widths     Cardiovascular    Dental     (+) upper dentures, lower dentures     Pulmonary    Abdominal            Anesthesia Plan    History of general anesthesia?: yes  History of complications of general anesthesia?: no    ASA 3     general     The patient is not a current smoker.  Patient was not previously instructed to abstain from smoking on day of procedure.  Patient did not smoke on day of procedure.  Education provided regarding risk of obstructive sleep apnea.  intravenous induction   Anesthetic plan and risks discussed with patient.    Plan discussed with CRNA and CAA.             [1]   Past Surgical History:  Procedure Laterality Date    HIP FRACTURE SURGERY Left     KNEE ARTHROPLASTY      KNEE SURGERY Left     replacement    KNEE SURGERY Right     arthritis removed    SHOULDER SURGERY Left     rotator cuff    TOTAL SHOULDER ARTHROPLASTY     [2]   Past Medical History:  Diagnosis Date    Arthritis     Chronic kidney disease     Diabetes mellitus (Multi)     Foot pain, left     bunion turned to wound    Hypertension     Right knee pain     UTI (urinary tract infection)    [3]   Current Facility-Administered Medications:     acetaminophen (Tylenol) tablet 650 mg, 650 mg, oral, q4h PRN **OR** acetaminophen (Tylenol) oral liquid 650 mg, 650 mg, oral, q4h PRN **OR** acetaminophen (Tylenol) suppository 650 mg, 650 mg, rectal, q4h PRN, Christ Benoit DO    [Held by provider] aspirin EC tablet 81 mg, 81 mg, oral, Daily, Christ Benoit DO    atorvastatin (Lipitor) tablet 20 mg, 20 mg, oral, Nightly, Christ Benoit DO    benzocaine-menthol (Cepastat Sore Throat) lozenge 1 lozenge, 1 lozenge, Mouth/Throat, q2h PRN, Christ Benoit DO    busPIRone (Buspar) tablet 10 mg, 10 mg, oral, TID, Christ Benoit DO, 10 mg at 07/20/25 5653    cetirizine (ZyrTEC)  tablet 10 mg, 10 mg, oral, Nightly, Christ Benoit DO    citalopram (CeleXA) tablet 40 mg, 40 mg, oral, Nightly, Christ Benoit, DO, 40 mg at 07/20/25 2155    docusate sodium (Colace) capsule 100 mg, 100 mg, oral, BID, Christ Benoit, DO, 100 mg at 07/20/25 2155    donepezil (Aricept) tablet 5 mg, 5 mg, oral, Nightly, Christ Benoit, DO, 5 mg at 07/20/25 2155    DULoxetine (Cymbalta) DR capsule 60 mg, 60 mg, oral, Daily, Christ Benoit DO    fluticasone (Flonase) nasal spray 2 spray, 2 spray, Each Nostril, Daily, Christ Benoit DO, 2 spray at 07/21/25 0902    insulin lispro injection 0-10 Units, 0-10 Units, subcutaneous, TID AC, Christ Benoit DO    lactated Ringer's infusion, 75 mL/hr, intravenous, Continuous, Christ Benoit DO, Last Rate: 75 mL/hr at 07/21/25 1115, 75 mL/hr at 07/21/25 1115    lidocaine 4 % patch 1 patch, 1 patch, transdermal, Daily, Christ Benoit DO, 1 patch at 07/21/25 0905    losartan (Cozaar) tablet 100 mg, 100 mg, oral, Daily, Christ Benoit DO    melatonin tablet 3 mg, 3 mg, oral, Nightly PRN, Christ Benoit, DO    morphine injection 2 mg, 2 mg, intravenous, q4h PRN, Christ Benoit, DO, 2 mg at 07/21/25 1026    morphine injection 4 mg, 4 mg, intravenous, q4h PRN, Christ Benoit DO, 4 mg at 07/20/25 2011    OLANZapine (ZyPREXA) tablet 2.5 mg, 2.5 mg, oral, Nightly, Christ Valete, DO    ondansetron ODT (Zofran-ODT) disintegrating tablet 4 mg, 4 mg, oral, q8h PRN **OR** ondansetron (Zofran) injection 4 mg, 4 mg, intravenous, q8h PRN, Christ Benoit DO    polyethylene glycol (Glycolax, Miralax) packet 17 g, 17 g, oral, Daily, Christ Benoit DO  [4]   Allergies  Allergen Reactions    Newell Rash    Ibuprofen Other     Kidney dx    Metformin Other     Cause kidney pain

## 2025-07-21 NOTE — ANESTHESIA POSTPROCEDURE EVALUATION
Patient: Adriana Duran    Procedure Summary       Date: 07/21/25 Room / Location: [facility] OR 12 / [facility]    Anesthesia Start: 1217 Anesthesia Stop: 1407    Procedure: ORIF, FRACTURE, FEMUR, PROXIMAL, USING TROCHANTERIC FIXATION NAIL (Right: Hip) Diagnosis:       Closed displaced intertrochanteric fracture of right femur, initial encounter      (Closed displaced intertrochanteric fracture of right femur, initial encounter [S72.141A])    Surgeons: Maykel Wu MD Responsible Provider: Lisandro Andrade DO    Anesthesia Type: general ASA Status: 3            Anesthesia Type: general    Vitals Value Taken Time   /62 07/21/25 14:31   Temp 36.7 °C (98.1 °F) 07/21/25 14:06   Pulse 87 07/21/25 14:30   Resp 29 07/21/25 14:30   SpO2 97 % 07/21/25 14:31   Vitals shown include unfiled device data.    Anesthesia Post Evaluation    Patient location during evaluation: bedside  Patient participation: complete - patient participated  Level of consciousness: awake  Pain score: 0  Pain management: adequate  Airway patency: patent  Two or more strategies used to mitigate risk of obstructive sleep apnea  Cardiovascular status: acceptable  Respiratory status: acceptable  Hydration status: acceptable  Postoperative Nausea and Vomiting: none  Comments: See intraoperative record for anesthetic actions related to the preoperative anesthesia plan.        There were no known notable events for this encounter.

## 2025-07-22 ENCOUNTER — APPOINTMENT (OUTPATIENT)
Dept: RADIOLOGY | Facility: HOSPITAL | Age: 80
DRG: 481 | End: 2025-07-22
Payer: MEDICARE

## 2025-07-22 LAB
ANION GAP SERPL CALCULATED.3IONS-SCNC: 10 MMOL/L (ref 10–20)
ATRIAL RATE: 86 BPM
BUN SERPL-MCNC: 19 MG/DL (ref 6–23)
CALCIUM SERPL-MCNC: 8.4 MG/DL (ref 8.6–10.3)
CHLORIDE SERPL-SCNC: 106 MMOL/L (ref 98–107)
CO2 SERPL-SCNC: 26 MMOL/L (ref 21–32)
CREAT SERPL-MCNC: 1.01 MG/DL (ref 0.5–1.05)
EGFRCR SERPLBLD CKD-EPI 2021: 56 ML/MIN/1.73M*2
ERYTHROCYTE [DISTWIDTH] IN BLOOD BY AUTOMATED COUNT: 15.8 % (ref 11.5–14.5)
FERRITIN SERPL-MCNC: 105 NG/ML (ref 8–150)
FOLATE SERPL-MCNC: 10.7 NG/ML
GLUCOSE BLD MANUAL STRIP-MCNC: 135 MG/DL (ref 74–99)
GLUCOSE BLD MANUAL STRIP-MCNC: 170 MG/DL (ref 74–99)
GLUCOSE BLD MANUAL STRIP-MCNC: 98 MG/DL (ref 74–99)
GLUCOSE BLD MANUAL STRIP-MCNC: 98 MG/DL (ref 74–99)
GLUCOSE SERPL-MCNC: 88 MG/DL (ref 74–99)
HCT VFR BLD AUTO: 24.2 % (ref 36–46)
HGB BLD-MCNC: 7.6 G/DL (ref 12–16)
IRON SATN MFR SERPL: 7 % (ref 25–45)
IRON SERPL-MCNC: 15 UG/DL (ref 35–150)
MCH RBC QN AUTO: 30 PG (ref 26–34)
MCHC RBC AUTO-ENTMCNC: 31.4 G/DL (ref 32–36)
MCV RBC AUTO: 96 FL (ref 80–100)
NRBC BLD-RTO: 0 /100 WBCS (ref 0–0)
P AXIS: 83 DEGREES
P OFFSET: 162 MS
P ONSET: 124 MS
PLATELET # BLD AUTO: 119 X10*3/UL (ref 150–450)
POTASSIUM SERPL-SCNC: 4.2 MMOL/L (ref 3.5–5.3)
PR INTERVAL: 178 MS
Q ONSET: 213 MS
QRS COUNT: 14 BEATS
QRS DURATION: 96 MS
QT INTERVAL: 362 MS
QTC CALCULATION(BAZETT): 433 MS
QTC FREDERICIA: 408 MS
R AXIS: -35 DEGREES
RBC # BLD AUTO: 2.53 X10*6/UL (ref 4–5.2)
SODIUM SERPL-SCNC: 138 MMOL/L (ref 136–145)
T AXIS: 62 DEGREES
T OFFSET: 394 MS
TIBC SERPL-MCNC: 214 UG/DL (ref 240–445)
UIBC SERPL-MCNC: 199 UG/DL (ref 110–370)
VENTRICULAR RATE: 86 BPM
VIT B12 SERPL-MCNC: 1135 PG/ML (ref 211–911)
WBC # BLD AUTO: 7.1 X10*3/UL (ref 4.4–11.3)

## 2025-07-22 PROCEDURE — 85027 COMPLETE CBC AUTOMATED: CPT | Performed by: NURSE PRACTITIONER

## 2025-07-22 PROCEDURE — 99221 1ST HOSP IP/OBS SF/LOW 40: CPT | Performed by: NURSE PRACTITIONER

## 2025-07-22 PROCEDURE — 97162 PT EVAL MOD COMPLEX 30 MIN: CPT | Mod: GP

## 2025-07-22 PROCEDURE — 1100000001 HC PRIVATE ROOM DAILY

## 2025-07-22 PROCEDURE — 83540 ASSAY OF IRON: CPT | Performed by: NURSE PRACTITIONER

## 2025-07-22 PROCEDURE — 36415 COLL VENOUS BLD VENIPUNCTURE: CPT | Performed by: NURSE PRACTITIONER

## 2025-07-22 PROCEDURE — 2500000004 HC RX 250 GENERAL PHARMACY W/ HCPCS (ALT 636 FOR OP/ED): Performed by: ORTHOPAEDIC SURGERY

## 2025-07-22 PROCEDURE — 82746 ASSAY OF FOLIC ACID SERUM: CPT | Mod: WESLAB | Performed by: NURSE PRACTITIONER

## 2025-07-22 PROCEDURE — 2500000001 HC RX 250 WO HCPCS SELF ADMINISTERED DRUGS (ALT 637 FOR MEDICARE OP): Performed by: NURSE PRACTITIONER

## 2025-07-22 PROCEDURE — 2500000004 HC RX 250 GENERAL PHARMACY W/ HCPCS (ALT 636 FOR OP/ED): Performed by: NURSE PRACTITIONER

## 2025-07-22 PROCEDURE — 74176 CT ABD & PELVIS W/O CONTRAST: CPT

## 2025-07-22 PROCEDURE — 82728 ASSAY OF FERRITIN: CPT | Performed by: NURSE PRACTITIONER

## 2025-07-22 PROCEDURE — 99233 SBSQ HOSP IP/OBS HIGH 50: CPT | Performed by: NURSE PRACTITIONER

## 2025-07-22 PROCEDURE — 80048 BASIC METABOLIC PNL TOTAL CA: CPT | Performed by: NURSE PRACTITIONER

## 2025-07-22 PROCEDURE — 82607 VITAMIN B-12: CPT | Mod: WESLAB | Performed by: NURSE PRACTITIONER

## 2025-07-22 PROCEDURE — 2500000002 HC RX 250 W HCPCS SELF ADMINISTERED DRUGS (ALT 637 FOR MEDICARE OP, ALT 636 FOR OP/ED): Performed by: NURSE PRACTITIONER

## 2025-07-22 PROCEDURE — 2500000005 HC RX 250 GENERAL PHARMACY W/O HCPCS: Performed by: NURSE PRACTITIONER

## 2025-07-22 PROCEDURE — 74176 CT ABD & PELVIS W/O CONTRAST: CPT | Performed by: RADIOLOGY

## 2025-07-22 PROCEDURE — 97166 OT EVAL MOD COMPLEX 45 MIN: CPT | Mod: GO

## 2025-07-22 PROCEDURE — 82947 ASSAY GLUCOSE BLOOD QUANT: CPT

## 2025-07-22 RX ORDER — MORPHINE SULFATE 2 MG/ML
2 INJECTION, SOLUTION INTRAMUSCULAR; INTRAVENOUS EVERY 4 HOURS PRN
Status: DISCONTINUED | OUTPATIENT
Start: 2025-07-22 | End: 2025-07-22

## 2025-07-22 RX ORDER — PANTOPRAZOLE SODIUM 40 MG/10ML
40 INJECTION, POWDER, LYOPHILIZED, FOR SOLUTION INTRAVENOUS 2 TIMES DAILY
Status: DISCONTINUED | OUTPATIENT
Start: 2025-07-22 | End: 2025-07-25 | Stop reason: HOSPADM

## 2025-07-22 RX ORDER — SODIUM CHLORIDE 9 MG/ML
80 INJECTION, SOLUTION INTRAVENOUS CONTINUOUS
Status: DISCONTINUED | OUTPATIENT
Start: 2025-07-22 | End: 2025-07-22

## 2025-07-22 RX ADMIN — SODIUM CHLORIDE 80 ML/HR: 900 INJECTION, SOLUTION INTRAVENOUS at 04:14

## 2025-07-22 RX ADMIN — ASPIRIN 325 MG: 325 TABLET, COATED ORAL at 11:51

## 2025-07-22 RX ADMIN — LIDOCAINE 4% 1 PATCH: 40 PATCH TOPICAL at 11:50

## 2025-07-22 RX ADMIN — INSULIN LISPRO 2 UNITS: 100 INJECTION, SOLUTION INTRAVENOUS; SUBCUTANEOUS at 12:59

## 2025-07-22 RX ADMIN — DULOXETINE 60 MG: 60 CAPSULE, DELAYED RELEASE ORAL at 11:50

## 2025-07-22 RX ADMIN — PANTOPRAZOLE SODIUM 40 MG: 40 INJECTION, POWDER, LYOPHILIZED, FOR SOLUTION INTRAVENOUS at 19:49

## 2025-07-22 RX ADMIN — ACETAMINOPHEN 650 MG: 325 TABLET ORAL at 04:13

## 2025-07-22 RX ADMIN — ACETAMINOPHEN 650 MG: 325 TABLET ORAL at 12:58

## 2025-07-22 RX ADMIN — BUSPIRONE HYDROCHLORIDE 10 MG: 10 TABLET ORAL at 11:50

## 2025-07-22 RX ADMIN — CEFAZOLIN SODIUM 2 G: 2 SOLUTION INTRAVENOUS at 04:14

## 2025-07-22 RX ADMIN — SODIUM CHLORIDE 80 ML/HR: 900 INJECTION, SOLUTION INTRAVENOUS at 13:24

## 2025-07-22 RX ADMIN — ASPIRIN 325 MG: 325 TABLET, COATED ORAL at 19:49

## 2025-07-22 RX ADMIN — PSYLLIUM HUSK 1 PACKET: 3.4 POWDER ORAL at 11:51

## 2025-07-22 RX ADMIN — DOCUSATE SODIUM 100 MG: 100 CAPSULE, LIQUID FILLED ORAL at 19:49

## 2025-07-22 RX ADMIN — DOCUSATE SODIUM 100 MG: 100 CAPSULE, LIQUID FILLED ORAL at 11:50

## 2025-07-22 RX ADMIN — ACETAMINOPHEN 650 MG: 325 TABLET ORAL at 17:35

## 2025-07-22 RX ADMIN — CETIRIZINE HYDROCHLORIDE 10 MG: 10 TABLET, FILM COATED ORAL at 19:49

## 2025-07-22 RX ADMIN — OLANZAPINE 2.5 MG: 2.5 TABLET, FILM COATED ORAL at 19:49

## 2025-07-22 RX ADMIN — CITALOPRAM 40 MG: 40 TABLET, FILM COATED ORAL at 19:49

## 2025-07-22 RX ADMIN — DONEPEZIL HYDROCHLORIDE 5 MG: 5 TABLET, FILM COATED ORAL at 19:49

## 2025-07-22 RX ADMIN — BUSPIRONE HYDROCHLORIDE 10 MG: 10 TABLET ORAL at 17:35

## 2025-07-22 RX ADMIN — BUSPIRONE HYDROCHLORIDE 10 MG: 10 TABLET ORAL at 19:49

## 2025-07-22 RX ADMIN — PANTOPRAZOLE SODIUM 40 MG: 40 INJECTION, POWDER, LYOPHILIZED, FOR SOLUTION INTRAVENOUS at 11:51

## 2025-07-22 RX ADMIN — POLYETHYLENE GLYCOL 3350 17 G: 17 POWDER, FOR SOLUTION ORAL at 11:50

## 2025-07-22 RX ADMIN — FLUTICASONE PROPIONATE 2 SPRAY: 50 SPRAY, METERED NASAL at 11:51

## 2025-07-22 RX ADMIN — ATORVASTATIN CALCIUM 20 MG: 20 TABLET, FILM COATED ORAL at 19:49

## 2025-07-22 ASSESSMENT — COGNITIVE AND FUNCTIONAL STATUS - GENERAL
MOVING FROM LYING ON BACK TO SITTING ON SIDE OF FLAT BED WITH BEDRAILS: A LOT
HELP NEEDED FOR BATHING: A LOT
DAILY ACTIVITIY SCORE: 19
MOVING TO AND FROM BED TO CHAIR: A LITTLE
STANDING UP FROM CHAIR USING ARMS: A LOT
MOBILITY SCORE: 24
WALKING IN HOSPITAL ROOM: TOTAL
TURNING FROM BACK TO SIDE WHILE IN FLAT BAD: A LOT
MOBILITY SCORE: 8
TURNING FROM BACK TO SIDE WHILE IN FLAT BAD: A LITTLE
MOVING FROM LYING ON BACK TO SITTING ON SIDE OF FLAT BED WITH BEDRAILS: A LITTLE
PERSONAL GROOMING: A LOT
STANDING UP FROM CHAIR USING ARMS: TOTAL
DRESSING REGULAR LOWER BODY CLOTHING: A LITTLE
HELP NEEDED FOR BATHING: A LITTLE
TOILETING: TOTAL
DRESSING REGULAR UPPER BODY CLOTHING: A LOT
WALKING IN HOSPITAL ROOM: A LOT
EATING MEALS: A LITTLE
CLIMB 3 TO 5 STEPS WITH RAILING: TOTAL
DAILY ACTIVITIY SCORE: 24
CLIMB 3 TO 5 STEPS WITH RAILING: A LOT
DRESSING REGULAR LOWER BODY CLOTHING: TOTAL
TOILETING: A LITTLE
MOBILITY SCORE: 15
MOVING TO AND FROM BED TO CHAIR: TOTAL
PERSONAL GROOMING: A LITTLE
DAILY ACTIVITIY SCORE: 11
DRESSING REGULAR UPPER BODY CLOTHING: A LITTLE

## 2025-07-22 ASSESSMENT — ENCOUNTER SYMPTOMS
LIGHT-HEADEDNESS: 0
COLOR CHANGE: 0
VOMITING: 0
CHILLS: 0
HEADACHES: 0
SHORTNESS OF BREATH: 0
DIZZINESS: 0
SLEEP DISTURBANCE: 0
SPEECH DIFFICULTY: 0
TROUBLE SWALLOWING: 0
UNEXPECTED WEIGHT CHANGE: 0
DIARRHEA: 0
COUGH: 0
DIFFICULTY URINATING: 0
JOINT SWELLING: 0
WEAKNESS: 1
CONFUSION: 0
NAUSEA: 0
FEVER: 0
FATIGUE: 1
WHEEZING: 0
CONSTIPATION: 0
BLOOD IN STOOL: 0
ABDOMINAL PAIN: 0
ARTHRALGIAS: 0
ABDOMINAL DISTENTION: 0

## 2025-07-22 ASSESSMENT — PAIN - FUNCTIONAL ASSESSMENT
PAIN_FUNCTIONAL_ASSESSMENT: FLACC (FACE, LEGS, ACTIVITY, CRY, CONSOLABILITY)
PAIN_FUNCTIONAL_ASSESSMENT: 0-10

## 2025-07-22 ASSESSMENT — ACTIVITIES OF DAILY LIVING (ADL)
ADL_ASSISTANCE: NEEDS ASSISTANCE
BATHING_ASSISTANCE: MAXIMAL

## 2025-07-22 ASSESSMENT — PAIN SCALES - GENERAL
PAINLEVEL_OUTOF10: 0 - NO PAIN
PAINLEVEL_OUTOF10: 5 - MODERATE PAIN
PAINLEVEL_OUTOF10: 5 - MODERATE PAIN

## 2025-07-22 NOTE — CARE PLAN
The patient's goals for the shift include rest    The clinical goals for the shift include pt will be free of falls, injury, and will improve neurologically. rest    Over the shift, the patient did not make progress toward the following goals. Barriers to progression include na. Recommendations to address these barriers include na.      Problem: Pain - Adult  Goal: Verbalizes/displays adequate comfort level or baseline comfort level  7/22/2025 1243 by Tamera Maria RN  Outcome: Progressing  7/22/2025 1242 by Tamera Maria RN  Outcome: Progressing     Problem: Safety - Adult  Goal: Free from fall injury  7/22/2025 1243 by Tamera Maria RN  Outcome: Progressing  7/22/2025 1242 by Tamera Maria RN  Outcome: Progressing     Problem: Discharge Planning  Goal: Discharge to home or other facility with appropriate resources  7/22/2025 1243 by Tamera Maria RN  Outcome: Progressing  7/22/2025 1242 by Tamera Maria RN  Outcome: Progressing     Problem: Chronic Conditions and Co-morbidities  Goal: Patient's chronic conditions and co-morbidity symptoms are monitored and maintained or improved  7/22/2025 1243 by Tamera Maria RN  Outcome: Progressing  7/22/2025 1242 by Tamera Maria RN  Outcome: Progressing     Problem: Nutrition  Goal: Nutrient intake appropriate for maintaining nutritional needs  7/22/2025 1243 by Tamera Maria RN  Outcome: Progressing  7/22/2025 1242 by Tamera Maria RN  Outcome: Progressing     Problem: Skin  Goal: Decreased wound size/increased tissue granulation at next dressing change  7/22/2025 1243 by Tamera Maria RN  Outcome: Progressing  7/22/2025 1242 by Tamera Maria RN  Outcome: Progressing  Goal: Participates in plan/prevention/treatment measures  7/22/2025 1243 by Tamera Maria RN  Outcome: Progressing  7/22/2025 1242 by Tamera Maria RN  Outcome: Progressing  Goal: Prevent/manage excess moisture  7/22/2025 1243 by Tamera Maria RN  Outcome: Progressing  7/22/2025  1242 by Tamera Maria RN  Outcome: Progressing  Goal: Prevent/minimize sheer/friction injuries  7/22/2025 1243 by Tamera Maria RN  Outcome: Progressing  7/22/2025 1242 by Tamera Maria RN  Outcome: Progressing  Goal: Promote/optimize nutrition  7/22/2025 1243 by Tamera Maria RN  Outcome: Progressing  7/22/2025 1242 by Tamera Maria RN  Outcome: Progressing  Goal: Promote skin healing  7/22/2025 1243 by Tamera Maria RN  Outcome: Progressing  7/22/2025 1242 by Tamera Maria RN  Outcome: Progressing     Problem: Diabetes  Goal: Achieve decreasing blood glucose levels by end of shift  7/22/2025 1243 by Tamera Maria RN  Outcome: Progressing  7/22/2025 1242 by Tamera Maria RN  Outcome: Progressing  Goal: Increase stability of blood glucose readings by end of shift  7/22/2025 1243 by Tamera Maria RN  Outcome: Progressing  7/22/2025 1242 by Tamera Maria RN  Outcome: Progressing  Goal: Decrease in ketones present in urine by end of shift  7/22/2025 1243 by Tamera Maria RN  Outcome: Progressing  7/22/2025 1242 by Tamera Maria RN  Outcome: Progressing  Goal: Maintain electrolyte levels within acceptable range throughout shift  7/22/2025 1243 by Tamera Maria RN  Outcome: Progressing  7/22/2025 1242 by Tamera Maria RN  Outcome: Progressing  Goal: Maintain glucose levels >70mg/dl to <250mg/dl throughout shift  7/22/2025 1243 by Tamera Maria RN  Outcome: Progressing  7/22/2025 1242 by Tamera Maria RN  Outcome: Progressing  Goal: No changes in neurological exam by end of shift  7/22/2025 1243 by Tamera Maria RN  Outcome: Progressing  7/22/2025 1242 by Tamera Maria RN  Outcome: Progressing  Goal: Learn about and adhere to nutrition recommendations by end of shift  7/22/2025 1243 by Tamera Maria RN  Outcome: Progressing  7/22/2025 1242 by Tamera Maria RN  Outcome: Progressing  Goal: Vital signs within normal range for age by end of shift  7/22/2025 1243 by Tamera Maria,  RN  Outcome: Progressing  7/22/2025 1242 by Tamera Maria RN  Outcome: Progressing  Goal: Increase self care and/or family involovement by end of shift  7/22/2025 1243 by Tamera Maria RN  Outcome: Progressing  7/22/2025 1242 by Tamera Maria RN  Outcome: Progressing  Goal: Receive DSME education by end of shift  7/22/2025 1243 by Tamera Maria RN  Outcome: Progressing  7/22/2025 1242 by Tamera Maria RN  Outcome: Progressing     Problem: Pain  Goal: Takes deep breaths with improved pain control throughout the shift  7/22/2025 1243 by Tamera Maria RN  Outcome: Progressing  7/22/2025 1242 by Tamera Maria RN  Outcome: Progressing  Goal: Turns in bed with improved pain control throughout the shift  7/22/2025 1243 by Tamera Maria RN  Outcome: Progressing  7/22/2025 1242 by Tamera Maria RN  Outcome: Progressing  Goal: Walks with improved pain control throughout the shift  7/22/2025 1243 by Tamera Maria RN  Outcome: Progressing  7/22/2025 1242 by Tamera Maria RN  Outcome: Progressing  Goal: Performs ADL's with improved pain control throughout shift  7/22/2025 1243 by Tamera Maria RN  Outcome: Progressing  7/22/2025 1242 by Tamera Maria RN  Outcome: Progressing  Goal: Participates in PT with improved pain control throughout the shift  7/22/2025 1243 by Tamera Maria RN  Outcome: Progressing  7/22/2025 1242 by Tamera Maria RN  Outcome: Progressing  Goal: Free from opioid side effects throughout the shift  7/22/2025 1243 by Tamera Maria RN  Outcome: Progressing  7/22/2025 1242 by Tamera Maria RN  Outcome: Progressing  Goal: Free from acute confusion related to pain meds throughout the shift  7/22/2025 1243 by Tamera Maria RN  Outcome: Progressing  7/22/2025 1242 by Tamera Maria RN  Outcome: Progressing

## 2025-07-22 NOTE — NURSING NOTE
Miguelito Richards made aware of pts pressures. Since pt is asymptomatic, we will continue to monitor at this time. Care ongoing.

## 2025-07-22 NOTE — PROGRESS NOTES
Occupational Therapy    Evaluation    Patient Name: Adriana Duran  MRN: [id number]  Department: [address]  Room: [facility]447-  Today's Date: 7/22/2025  Time Calculation  Start Time: 1104  Stop Time: 1128  Time Calculation (min): 24 min        Assessment:  OT Assessment: pt presents with 25% WB RLE, pain, confusion, reduced endurance and decreased balance which impedes ADL performance. pt would benefit from skilled OT services to address these deficits and to facilitate highest level of independence.  Prognosis: Fair  Barriers to Discharge Home: Caregiver assistance, Cognition needs, Physical needs  Caregiver Assistance: Caregiver assistance needed per identified barriers - however, level of patient's required assistance exceeds assistance available at home  Cognition Needs: 24hr supervision for safety awareness needed, Cognition-related high falls risk  Physical Needs: 24hr mobility assistance needed, 24hr ADL assistance needed, High falls risk due to function or environment, Weight bearing precautions unable to be safely maintained  Evaluation/Treatment Tolerance: Patient limited by fatigue, Patient limited by pain  Medical Staff Made Aware: Yes  End of Session Communication: Bedside nurse  End of Session Patient Position: Bed, 3 rail up, Alarm on  OT Assessment Results: Decreased ADL status, Decreased safe judgment during ADL, Decreased cognition, Decreased endurance, Decreased functional mobility, Decreased trunk control for functional activities  Prognosis: Fair  Evaluation/Treatment Tolerance: Patient limited by fatigue, Patient limited by pain  Medical Staff Made Aware: Yes  Strengths: Attitude of self  Barriers to Participation: Ability to acquire knowledge, Comorbidities  Plan:  Treatment Interventions: ADL retraining, Functional transfer training, UE strengthening/ROM, Endurance training, Equipment evaluation/education, Compensatory technique education  OT Frequency: 4 times per week (During this acute  inpatient hospitalization)  OT Discharge Recommendations: Moderate intensity level of continued care (Based on current functional status and rehab potential, patient is anticipated to tolerate and benefit from 5 or more days per week of skilled rehabilitative therapy after discharge from this acute inpatient hospitalization.)  Equipment Recommended upon Discharge: Wheelchair  OT Recommended Transfer Status:  (TBD)  OT - OK to Discharge: Yes  Treatment Interventions: ADL retraining, Functional transfer training, UE strengthening/ROM, Endurance training, Equipment evaluation/education, Compensatory technique education    Subjective     OT Visit Info:  OT Received On: 07/22/25  General:  General  Reason for Referral: ADL impairment; 80 yr old female presenting with fall resulting in right intertrochanteric fracture now s/p ORIF on 7/21.  Past Medical History Relevant to Rehab: history of Arthritis, Chronic kidney disease, Diabetes mellitus, Foot pain, left, Hypertension, Right knee pain, and UTI  Family/Caregiver Present: Yes  Caregiver Feedback:  present and provided information  Co-Treatment: PT  Co-Treatment Reason: to maximize safety and participation  Prior to Session Communication: Bedside nurse  Patient Position Received: Bed, 3 rail up, Alarm on  Preferred Learning Style: verbal, visual  General Comment: RN reporting pt slighly anemic with low BP, okay to sit edge of bed.  pt pleasantly confused and cooperative  Precautions:  LE Weight Bearing Status: Right Partial Weight Bearing (25% WB RLE)  Medical Precautions: Fall precautions     Date/Time Vitals Session Patient Position Pulse Resp SpO2 BP MAP (mmHg)    07/22/25 1104 During OT  --  --  --  --  104/45  60     07/22/25 1127 --  --  90  20  98 %  118/54  69      Vital Signs Comment: 118/54 sitting edge of bed     Pain:  Pain Assessment  Pain Assessment: 0-10  0-10 (Numeric) Pain Score: 5 - Moderate pain  Pain Type: Surgical pain  Pain Location:  Hip  Pain Orientation: Right  Pain Interventions: Repositioned    Objective   Cognition:  Overall Cognitive Status: Impaired at baseline  Orientation Level: Disoriented to place, Disoriented to situation, Disoriented to time  Following Commands: Follows one step commands with increased time  Cognition Comments: pleasantly confused, poor historian           Home Living:  Type of Home: House  Lives With: Spouse  Home Adaptive Equipment: Walker rolling or standard, Wheelchair-manual  Home Layout: One level  Home Access: Stairs to enter with rails  Entrance Stairs-Rails: Both  Entrance Stairs-Number of Steps: 3  Bathroom Shower/Tub: Tub/shower unit  Bathroom Toilet: Adaptive toilet seating  Bathroom Equipment: Tub transfer bench  Bathroom Accessibility: main level  Prior Function:  Level of [place]: Needs assistance with ADLs, Needs assistance with homemaking  Receives Help From: Family  ADL Assistance: Needs assistance  Homemaking Assistance: Needs assistance  Ambulatory Assistance: Independent (Ind with walker)  Vocational: Retired  Hand Dominance: Right  Prior Function Comments: visting angels every Wednesday,  assists at baseline     ADL:  Eating Assistance: Minimal (anticipated)  Grooming Assistance: Moderate (anticipated)  Bathing Assistance: Maximal (anticipated)  UE Dressing Assistance: Moderate (anticipated)  LE Dressing Assistance: Total (anticipated)  Toileting Assistance with Device: Total (anticipated)  Activity Tolerance:  Endurance: Decreased tolerance for upright activites  Bed Mobility/Transfers: Bed Mobility  Bed Mobility: Yes  Bed Mobility 1  Bed Mobility 1: Supine to sitting  Level of Assistance 1: Maximum assistance, +2  Bed Mobility Comments 1: assist for managing BLE to edge of bed and for lifting trunk; effortful.  performed with HOB elevated.  pt reporting increased pain w/ movement  Bed Mobility 2  Bed Mobility  2: Sitting to supine  Level of Assistance 2: Maximum assistance,  +2  Bed Mobility Comments 2: assist x2 for lifting BLE back into bed and for lowering/guiding trunk    Transfers  Transfer: No (not attempted on this date due to low BP and concern for safety to maintain 25% RLE)    Sitting Balance:  Static Sitting Balance  Static Sitting-Balance Support: Feet supported, Bilateral upper extremity supported  Static Sitting-Level of Assistance: Contact guard, Close supervision  Static Sitting-Comment/Number of Minutes: pt tolerated sitting EOB for ~5 minutes total, required SBA after postural corrections      Vision:Vision - Basic Assessment  Current Vision: Wears glasses all the time  Sensation:  Light Touch: No apparent deficits  Strength:  Strength Comments: Distal BUE 3+/5  Perception:  Initiation: Cues to initiate tasks  Coordination:  Movements are Fluid and Coordinated: No  Upper Body Coordination: decreased rate/accuracy of movements   Hand Function:  Gross Grasp: Functional  Coordination: Functional  Extremities: RUE   RUE :  (shoulder flexion limited to ~90 degrees (pt stating right side sore from fall)) and LUE   LUE: Within Functional Limits      Outcome Measures:[facility] Daily Activity  Putting on and taking off regular lower body clothing: Total  Bathing (including washing, rinsing, drying): A lot  Putting on and taking off regular upper body clothing: A lot  Toileting, which includes using toilet, bedpan or urinal: Total  Taking care of personal grooming such as brushing teeth: A lot  Eating Meals: A little  Daily Activity - Total Score: 11        Education Documentation  Precautions, taught by Jama Adam OT at 7/22/2025 11:48 AM.  Learner: Patient  Readiness: Acceptance  Method: Explanation, Demonstration  Response: Needs Reinforcement  Comment: ADL/functional mobility techniques, facilitating OOB activity, OT POC, 25% WB RLE    Body Mechanics, taught by Jama Adam OT at 7/22/2025 11:48 AM.  Learner: Patient  Readiness: Acceptance  Method: Explanation,  Demonstration  Response: Needs Reinforcement  Comment: ADL/functional mobility techniques, facilitating OOB activity, OT POC, 25% WB RLE    ADL Training, taught by Jama Adam OT at 7/22/2025 11:48 AM.  Learner: Patient  Readiness: Acceptance  Method: Explanation, Demonstration  Response: Needs Reinforcement  Comment: ADL/functional mobility techniques, facilitating OOB activity, OT POC, 25% WB RLE    Education Comments  No comments found.        OP EDUCATION:       Goals:  Encounter Problems       Encounter Problems (Active)       ADLs       Patient will complete daily grooming tasks with supervision level of assistance and PRN adaptive equipment while edge of bed . (Progressing)       Start:  07/22/25    Expected End:  08/22/25               COGNITION/SAFETY       Patient will recall and adhere to weight bearing restrictions with all ADL and functional mobility in order to promote healing and safety with functional tasks (Progressing)       Start:  07/22/25    Expected End:  08/22/25               TRANSFERS       Patient will complete functional transfer to toilet/chair with least restrictive device with moderate assist level of assistance. (Progressing)       Start:  07/22/25    Expected End:  08/22/25            Patient will complete sit to stand transfer with moderate assist level of assistance and least restrictive device in order to improve safety and prepare for out of bed mobility. (Progressing)       Start:  07/22/25    Expected End:  08/22/25

## 2025-07-22 NOTE — CONSULTS
Consults    Reason For Consult  Anemia    History Of Present Illness  Adriana Duran is a 80 y.o. female presenting with fall, hip fx. Had surgical repair per Dr Wu this admission. Hgb decline from 10 to 7.8 since arrival. She does have a normocytic baseline anemia. On chart review, she has had intermittent severe anemia due to post op bleeding (foot ulcers). She denies any black or bloody stools. Denies abdominal pain, nausea, vomiting. No documented prior EGD or colonoscopy      Past Medical History  She has a past medical history of Arthritis, Chronic kidney disease, Diabetes mellitus (Multi), Foot pain, left, Hypertension, Right knee pain, and UTI (urinary tract infection).    Surgical History  She has a past surgical history that includes Hip fracture surgery (Left); Knee surgery (Left); Knee surgery (Right); Shoulder surgery (Left); Total shoulder arthroplasty; and Knee Arthroplasty.     Social History  She reports that she has quit smoking. Her smoking use included cigarettes. She has never been exposed to tobacco smoke. She has never used smokeless tobacco. She reports that she does not drink alcohol and does not use drugs.    Family History  Family History[1]     Allergies  Strawberry, Ibuprofen, and Metformin    Review of Systems   Constitutional:  Positive for fatigue. Negative for chills, fever and unexpected weight change.   HENT:  Negative for congestion and trouble swallowing.    Respiratory:  Negative for cough, shortness of breath and wheezing.    Cardiovascular:  Negative for chest pain.   Gastrointestinal:  Negative for abdominal distention, abdominal pain, blood in stool, constipation, diarrhea, nausea and vomiting.   Genitourinary:  Negative for difficulty urinating.   Musculoskeletal:  Negative for arthralgias and joint swelling.   Skin:  Negative for color change.   Neurological:  Positive for weakness. Negative for dizziness, speech difficulty, light-headedness and headaches.  "  Psychiatric/Behavioral:  Negative for confusion and sleep disturbance.         Physical Exam  Constitutional:       Appearance: Normal appearance.   HENT:      Head: Normocephalic and atraumatic.      Mouth/Throat:      Mouth: Mucous membranes are moist.   Pulmonary:      Effort: Pulmonary effort is normal.   Abdominal:      General: There is no distension.      Palpations: Abdomen is soft.      Tenderness: There is abdominal tenderness. There is no guarding.     Musculoskeletal:         General: Tenderness present. Normal range of motion.      Cervical back: Normal range of motion.     Skin:     General: Skin is warm and dry.     Neurological:      General: No focal deficit present.      Mental Status: She is alert. Mental status is at baseline.     Psychiatric:         Mood and Affect: Mood normal.          Last Recorded Vitals  Blood pressure 88/52, pulse 73, temperature 37.1 °C (98.7 °F), temperature source Oral, resp. rate 18, height 1.626 m (5' 4\"), weight 81.2 kg (179 lb 0.2 oz), SpO2 95%.    Relevant Results  Results for orders placed or performed during the hospital encounter of 07/20/25 (from the past 24 hours)   POCT GLUCOSE   Result Value Ref Range    POCT Glucose 86 74 - 99 mg/dL   POCT GLUCOSE   Result Value Ref Range    POCT Glucose 137 (H) 74 - 99 mg/dL   CBC   Result Value Ref Range    WBC 7.1 4.4 - 11.3 x10*3/uL    nRBC 0.0 0.0 - 0.0 /100 WBCs    RBC 2.53 (L) 4.00 - 5.20 x10*6/uL    Hemoglobin 7.6 (L) 12.0 - 16.0 g/dL    Hematocrit 24.2 (L) 36.0 - 46.0 %    MCV 96 80 - 100 fL    MCH 30.0 26.0 - 34.0 pg    MCHC 31.4 (L) 32.0 - 36.0 g/dL    RDW 15.8 (H) 11.5 - 14.5 %    Platelets 119 (L) 150 - 450 x10*3/uL   Basic Metabolic Panel   Result Value Ref Range    Glucose 88 74 - 99 mg/dL    Sodium 138 136 - 145 mmol/L    Potassium 4.2 3.5 - 5.3 mmol/L    Chloride 106 98 - 107 mmol/L    Bicarbonate 26 21 - 32 mmol/L    Anion Gap 10 10 - 20 mmol/L    Urea Nitrogen 19 6 - 23 mg/dL    Creatinine 1.01 0.50 - " 1.05 mg/dL    eGFR 56 (L) >60 mL/min/1.73m*2    Calcium 8.4 (L) 8.6 - 10.3 mg/dL   POCT GLUCOSE   Result Value Ref Range    POCT Glucose 98 74 - 99 mg/dL     Electrocardiogram, 12-lead PRN ACS symptoms  Result Date: 7/22/2025  Sinus rhythm with Premature atrial complexes Left axis deviation Abnormal ECG When compared with ECG of 29-DEC-2023 07:17, Premature ventricular complexes are no longer Present QRS axis Shifted right Nonspecific T wave abnormality no longer evident in Inferior leads Confirmed by George Sidhu ([id number]) on 7/22/2025 9:38:00 AM    FL less than 1 hour  Result Date: 7/21/2025  These images are not reportable by radiology and will not be interpreted by  Radiologists.    XR hip right with pelvis when performed 2 or 3 views  Result Date: 7/20/2025  Interpreted By:  Ovidio Hook, STUDY: XR HIP RIGHT WITH PELVIS WHEN PERFORMED 2 OR 3 VIEWS   INDICATION: Signs/Symptoms:Fall.   COMPARISON: None   ACCESSION NUMBER(S): [id number]   ORDERING CLINICIAN: DEXTER LYNCH   FINDINGS: Comminuted and varus angulated intertrochanteric right hip fracture.   Previous ORIF of the left hip grossly satisfactory.   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       Intertrochanteric right hip fracture.   Signed by: Ovidio Hook 7/20/2025 5:31 PM Dictation workstation:   [id number]         Assessment/Plan     Anemia, Hip Fracture   Hgb decline 10 to 7.8 since arrival. She did have femur fracture repair this admission. No black or bloody stools. Seems more likely ortho etiology than GI etiology of anemia. I did add iron ,ferritin, b12, folate. Could consider outpatient EGD/colonoscopy based on patient wishes. GI will sign off at this time. Please call us with any signs of overt GI blood loss     I spent 60 minutes in the professional and overall care of this patient.               [1]   Family History  Problem Relation Name Age of Onset    Heart disease Father      Cancer Father      Diabetes Paternal  Grandfather

## 2025-07-22 NOTE — CARE PLAN
Problem: Mobility  Goal: Bed mobility including supine to sit and sit to supine with min assist.  Outcome: Progressing

## 2025-07-22 NOTE — PROGRESS NOTES
Spiritual Care Visit  Spiritual Care Request    Reason for Visit:  Routine Visit: Introduction     Request Received From:       Focus of Care:  Visited With: Patient         Refer to :          Spiritual Care Assessment    Spiritual Assessment:                      Care Provided:  Intended Effects: Establish rapport and connectedness, Build relationship of care and support, Convey a calming presence, Demonstrate caring and concern, Lessen someone's feelings of isolation  Methods: Offer emotional support  Interventions: Explain  role    Sense of Community and or Temple Affiliation:  None         Addressed Needs/Concerns and/or Nahed Through:          Outcome:  Outcome of Spiritual Care Visit: Identifying spiritual/emotional issues, Comfort/healing presence     Advance Directives:         Spiritual Care Annotation        Annotation:  provided patient support while rounding the Unit.  introduced  services of [facility].   explained the role of the  in providing emotional and spiritual support for patient's and family while in admitted to the hospital.     Patient was resting slightly inclined in the hospital bed, appears comfortable. Patient stated that she fell and injured her leg,    Patient has family support.     No spiritual or [place] needs were expressed. Spiritual care will remain available for support as requested.

## 2025-07-22 NOTE — PROGRESS NOTES
Adriana Duran is a 80 y.o. female on day 2 of admission presenting with Closed displaced intertrochanteric fracture of right femur, initial encounter.      Subjective   Patient examined lying supine in bed with no acute complaints.  She denies pain at the present time.       Objective     Last Recorded Vitals  BP 88/52 (BP Location: Left arm, Patient Position: Lying)   Pulse 73   Temp 37.1 °C (98.7 °F) (Oral)   Resp 18   Wt 81.2 kg (179 lb 0.2 oz)   SpO2 95%   Intake/Output last 3 Shifts:    Intake/Output Summary (Last 24 hours) at 7/22/2025 0975  Last data filed at 7/22/2025 0631  Gross per 24 hour   Intake 1960 ml   Output 800 ml   Net 1160 ml       Admission Weight  Weight: 75 kg (165 lb 5.5 oz) (07/20/25 1721)    Daily Weight  07/21/25 : 81.2 kg (179 lb 0.2 oz)          Physical Exam    Constitutional: No acute distress, calm, cooperative  HEENT: PERRL, normocephalic, atraumatic, mucous membranes moist  Cardiovascular: Regular rhythm and rate,   Respiratory: Lungs clear to auscultation,   Gastrointestinal: Bowel sounds positive x 4, soft, nontender  Neurologic: Alert and oriented x 3 equal strength bilaterally  Musculoskeletal: Able to move all extremities, no edema  Skin: Warm, dry and intact                            Assessment & Plan  Closed displaced intertrochanteric fracture of right femur, initial encounter  Status post right femur ORIF postop day 1  IV fluids  Orthopedics following  PT/OT   Care management for placement  Anemia  Rule out for GI bleed  Hemoglobin dropped to 7.6 this morning  We will Hemoccult stools  IV Protonix twice daily  Check iron studies  Consult gastroenterology  Type 2 diabetes mellitus  Hypoglycemia protocol  Accu-Cheks AC and at bedtime  Insulin sliding scale  Chronic kidney disease  Currently stable, creatinine 1.24  DVT prophylaxis  Sequential compression  Disposition  Will need SNF for rehab after cleared by orthopedics and gastroenterology  Likely 1 to 2  days             CANDIDA Sanchez-CNP

## 2025-07-22 NOTE — ASSESSMENT & PLAN NOTE
Status post right femur ORIF postop day 1  IV fluids  Orthopedics following  PT/OT   Care management for placement  Anemia  Rule out for GI bleed  Hemoglobin dropped to 7.6 this morning  We will Hemoccult stools  IV Protonix twice daily  Check iron studies  Consult gastroenterology  Type 2 diabetes mellitus  Hypoglycemia protocol  Accu-Cheks AC and at bedtime  Insulin sliding scale  Chronic kidney disease  Currently stable, creatinine 1.24  DVT prophylaxis  Sequential compression  Disposition  Will need SNF for rehab after cleared by orthopedics and gastroenterology  Likely 1 to 2 days

## 2025-07-22 NOTE — NURSING NOTE
Assumed care of pt. Pt lying in bed having vitals complete dt this time. Pts pressures are soft. Rt arm pressure 82/46, HR 73, spo2 95%. Pt as been AnOx 2 since surgery yesterday, physicians attributing it to anesthetic. This RN will secure message the hospiatlist that is on this AM. Care ongoing.

## 2025-07-22 NOTE — NURSING NOTE
Per day shift nurse, patient has been confused since coming back from surgery. Previously aox4. Pt disoriented to time and situation. Reorientation attempted but forgetful. Patient becoming increasingly irritable because she does not understand why she has to be in the hospital and wonders where her  is. Pt not receptive to reorientation attempts. Rosalia notified of neuro status.

## 2025-07-22 NOTE — NURSING NOTE
Pt etting off floor for CT of abd and pelvis due to pts low hgb to check for further signs of bleed. Pt unhooked from fluids. Care ongoing.

## 2025-07-22 NOTE — CARE PLAN
The patient's goals for the shift include rest    The clinical goals for the shift include pt will be free of falls and injury this shift    Problem: Pain - Adult  Goal: Verbalizes/displays adequate comfort level or baseline comfort level  Outcome: Progressing     Problem: Safety - Adult  Goal: Free from fall injury  Outcome: Progressing     Problem: Discharge Planning  Goal: Discharge to home or other facility with appropriate resources  Outcome: Progressing     Problem: Chronic Conditions and Co-morbidities  Goal: Patient's chronic conditions and co-morbidity symptoms are monitored and maintained or improved  Outcome: Progressing     Problem: Nutrition  Goal: Nutrient intake appropriate for maintaining nutritional needs  Outcome: Progressing     Problem: Skin  Goal: Decreased wound size/increased tissue granulation at next dressing change  Outcome: Progressing  Goal: Participates in plan/prevention/treatment measures  Outcome: Progressing  Goal: Prevent/manage excess moisture  Outcome: Progressing  Flowsheets (Taken 7/21/2025 2031)  Prevent/manage excess moisture:   Cleanse incontinence/protect with barrier cream   Follow provider orders for dressing changes  Goal: Prevent/minimize sheer/friction injuries  Outcome: Progressing  Goal: Promote/optimize nutrition  Outcome: Progressing  Goal: Promote skin healing  Outcome: Progressing

## 2025-07-22 NOTE — PROGRESS NOTES
Physical Therapy    Physical Therapy Evaluation & Treatment    Patient Name: Adriana Duran  MRN: [id number]  Department: [address]  Room: [facility]-A  Today's Date: 7/22/2025   Time Calculation  Start Time: 1102  Stop Time: 1127  Time Calculation (min): 25 min    Assessment/Plan   PT Assessment  PT Assessment Results: Decreased strength, Decreased endurance, Impaired balance, Decreased mobility, Decreased safety awareness  Rehab Prognosis: Good  Barriers to Discharge Home: Caregiver assistance, Cognition needs, Physical needs  Caregiver Assistance: Caregiver assistance needed per identified barriers - however, level of patient's required assistance exceeds assistance available at home  Cognition Needs: 24hr supervision for safety awareness needed, Cognition-related high falls risk  Physical Needs: 24hr mobility assistance needed  Evaluation/Treatment Tolerance: Patient limited by fatigue, Patient limited by pain (hypotension)  End of Session Communication: Bedside nurse  Assessment Comment: 80 year old female presents with decline from baseline functional mobility, decreased ROM right LE, decreased strength right LE, impaired balance, decreased tolerance to activity, and decreased safety awareness;  patient would benefit from skilled physical therapy services to safely maximize functional mobility to opitmal levels.  End of Session Patient Position: Bed, 3 rail up, Alarm on   IP OR SWING BED PT PLAN  Inpatient or Swing Bed: Inpatient  PT Plan  Treatment/Interventions: Bed mobility, Transfer training, Balance training, Strengthening, Endurance training, Range of motion, Therapeutic exercise, Therapeutic activity, Gait training  PT Plan: Ongoing PT  PT Frequency: 6 times per week (during this acute care hospitalization)  PT Discharge Recommendations: Moderate intensity level of continued care (Based on curring functional status and rehab potential, patient is anticipated to tolerate and benefit from 5 or more days per  week of skilled rehabilitative therapy after discharge from this acute inpatient hospitalization.)  PT Recommended Transfer Status: Assist x2  PT - OK to Discharge: Yes (with skilled physical therapy services at next level of care)      Subjective     PT Visit Info:  PT Received On: 07/22/25  General Visit Information:  General  Reason for Referral: Impaired mobility with closed displaced intertrochanteric fracture of right femur  Past Medical History Relevant to Rehab: Dementia, arthritis, CKD, DM, HTN, UTI, left hip fracture surgery, left knee surgery, TSA, TKR, OA, depression, anxiety  Family/Caregiver Present: Yes  Caregiver Feedback: Spouse  Co-Treatment: OT  Co-Treatment Reason: Need for 2nd skilled person for therapeutic handling during functional mobility to optimize safety and outcomes  Prior to Session Communication: Bedside nurse (RN reports cleared patient anemic with hypotension;  cleared patient for sitting on side of bed)  Patient Position Received: Bed, 3 rail up, Alarm off, caregiver present  General Comment: 80 year old female admit from home s/p fall;  s/p ORIF with trochanteric fixation nail right femur 7/22/2025  Home Living:  Home Living  Type of Home: House  Lives With: Spouse  Home Adaptive Equipment: Walker rolling or standard, Wheelchair-manual  Home Layout: One level  Home Access: Stairs to enter with rails  Entrance Stairs-Rails: Both  Entrance Stairs-Number of Steps: 3-4  Bathroom Shower/Tub: Tub/shower unit  Bathroom Equipment: Tub transfer bench (shower stool)  Prior Level of Function:  Prior Function Per Pt/Caregiver Report  Ambulatory Assistance:  (Modified independent with rolling walker)  Precautions:  Precautions  LE Weight Bearing Status: Right Partial Weight Bearing (25% weightbearing)  Medical Precautions: Fall precautions     Date/Time Vitals Session Patient Position Pulse Resp SpO2 BP MAP (mmHg)    07/22/25 1102 --  --  --  --  --  118/54  69     07/22/25 1104 During OT  --   --  --  --  104/45  60     07/22/25 1127 --  --  90  20  98 %  118/54  69           Objective   Pain:  Pain Assessment  Pain Assessment: FLACC (Face, Legs, Activity, Cry, Consolability)  0-10 (Numeric) Pain Score: 5 - Moderate pain  Pain Type: Surgical pain  Pain Location: Hip  Pain Orientation: Right  Pain Interventions:  (Therapeutic functional mobility)  Cognition:  Cognition  Overall Cognitive Status: Impaired (Oriented x 1; able to follow simple commands)    General Assessments:                  Activity Tolerance  Endurance: Decreased tolerance for upright activites  Activity Tolerance Comments: Pain;  fatigue;  hypotension    Sensation  Sensation Comment: Patient denies numbness adolfo LE    Strength  Strength Comments: Adolfo LE strength assessed via function  Coordination  Movements are Fluid and Coordinated: No  Lower Body Coordination: All adolfo LE movements are slow and guarded due to pain    Static Sitting Balance  Static Sitting-Balance Support: Bilateral upper extremity supported  Static Sitting-Level of Assistance: Minimum assistance  Static Sitting-Comment/Number of Minutes: Intermittent assist to maintain midline while sitting on side of bed due to decreased balance posterior       Functional Assessments:  Bed Mobility  Bed Mobility: Yes  Bed Mobility 1  Bed Mobility 1: Supine to sitting  Level of Assistance 1: Maximum assistance (x 2)  Bed Mobility Comments 1: Assist with trunk-up and adolfo LE;  max assist to scoot hips to edge of bed during supine to sit  Bed Mobility 2  Bed Mobility  2: Sitting to supine  Level of Assistance 2: Maximum assistance (x 2)  Bed Mobility Comments 2: Assist with trunk and adolfo LE  Bed Mobility 3  Bed Mobility 3: Scooting  Level of Assistance 3: Maximum assistance (x 2)    Transfers  Transfer: No    Ambulation/Gait Training  Ambulation/Gait Training Performed: No    Stairs  Stairs: No  Extremity/Trunk Assessments:  RLE   RLE : Exceptions to WFL  AROM RLE (degrees)  R Hip Flexion  0-125: 20 (Pain limiting)  R Ankle Dorsiflexion 0-20:  (Dorsiflexion to neutral position)  Strength RLE  R Hip Flexion: 1/5  R Knee Extension: 2-/5  R Ankle Dorsiflexion: 2-/5  LLE   LLE : Exceptions to WFL  Strength LLE  L Hip Flexion: 3-/5  L Knee Extension: 3-/5  L Ankle Dorsiflexion: 2/5  Treatments:       Therapeutic Activity  Therapeutic Activity Performed: Yes  Therapeutic Activity 1: Sitting balance activities including maintaining midline while sitting on side of bed x ~5 minutes with intermittent episodes of min assist.         Bed Mobility  Bed Mobility: Yes  Bed Mobility 1  Bed Mobility 1: Supine to sitting  Level of Assistance 1: Maximum assistance (x 2)  Bed Mobility Comments 1: Assist with trunk-up and ross LE;  max assist to scoot hips to edge of bed during supine to sit  Bed Mobility 2  Bed Mobility  2: Sitting to supine  Level of Assistance 2: Maximum assistance (x 2)  Bed Mobility Comments 2: Assist with trunk and ross LE  Bed Mobility 3  Bed Mobility 3: Scooting  Level of Assistance 3: Maximum assistance (x 2)    Ambulation/Gait Training  Ambulation/Gait Training Performed: No  Transfers  Transfer: No    Stairs  Stairs: No       Outcome Measures:  [facility] Basic Mobility  Turning from your back to your side while in a flat bed without using bedrails: A lot  Moving from lying on your back to sitting on the side of a flat bed without using bedrails: A lot  Moving to and from bed to chair (including a wheelchair): Total  Standing up from a chair using your arms (e.g. wheelchair or bedside chair): Total  To walk in hospital room: Total  Climbing 3-5 steps with railing: Total  Basic Mobility - Total Score: 8    Encounter Problems       Encounter Problems (Active)       Mobility       Bed mobility including supine to sit and sit to supine with min assist. (Progressing)       Start:  07/22/25    Expected End:  08/05/25            Transfers including sit to stand and stand to sit with mod assist x 1-2 and  partial weightbearing (25%) right LE.       Start:  07/22/25    Expected End:  08/05/25            Ambulate 5 feet with walker, mod assist x 2, and partial weightbearing (25%) right LE.       Start:  07/22/25    Expected End:  08/05/25               PT Transfers       Transfers including stand pivot bed to chair and chair to bed with walker, partial weightbearing (25%) right LE, and mod assist x 1-2.       Start:  07/22/25    Expected End:  08/05/25                   Education Documentation  Precautions, taught by Pj Grey PT at 7/22/2025 11:58 AM.  Learner: Patient  Readiness: Acceptance  Method: Demonstration, Explanation  Response: Needs Reinforcement  Comment: Educated patient regarding acute skilled physical therapy plan of care    Mobility Training, taught by Pj Grey PT at 7/22/2025 11:58 AM.  Learner: Patient  Readiness: Acceptance  Method: Demonstration, Explanation  Response: Needs Reinforcement  Comment: Educated patient regarding acute skilled physical therapy plan of care    Education Comments  No comments found.

## 2025-07-22 NOTE — NURSING NOTE
Pts CT of pelvis results are back. This RN made Miguelito CULVER aware as well as called Dr. Boyd's office to tell te physician to review CT results. Care ongoing.

## 2025-07-22 NOTE — DOCUMENTATION CLARIFICATION NOTE
"    PATIENT:               LYNN GOMEZ  ACCT #:                  [id number]  MRN:                       [id number]  :                       1945  ADMIT DATE:       2025 4:53 PM  DISCH DATE:  RESPONDING PROVIDER #:        [id number]          PROVIDER RESPONSE TEXT:    Acute blood loss anemia    CDI QUERY TEXT:    Clarification        Instruction:    Based on your assessment of the patient and the clinical information, please provide the requested documentation by clicking on the appropriate radio button and enter any additional information if prompted.    Question: Please further clarify the diagnosis of anemia as    When answering this query, please exercise your independent professional judgment. The fact that a question is being asked, does not imply that any particular answer is desired or expected.    The patient's clinical indicators include:  Clinical Information:  80 y.o. female presenting with mechanical fall, found to have a right intertrochanteric fracture.    Clinical Indicators:  - H/H- 10.5/33.8  - H/H- 6.3/27.6  - H/H- 7.6/24.2    - OR Report- \"ORIF, FRACTURE, FEMUR, PROXIMAL, USING TROCHANTERIC FIXATION NAIL   EBL- 150ml\".    - Hospitalist note- \"Anemia  -        Rule out for GI bleed  -        Hemoglobin dropped to 7.6 this morning  -        We will Hemoccult stools  -        IV Protonix twice daily  -        Check iron studies  -        Consult gastroenterology\".    Treatment:  H/H daily  Hemoccult stools  Protonix 40mf IV BID  Consult GI    Risk Factors:  Fractured femur  ORIF  Options provided:  -- Acute blood loss anemia  -- Other - I will add my own diagnosis  -- Refer to Clinical Documentation Reviewer    Query created by: Domonique Desai on 2025 10:36 AM      Electronically signed by:  NANCY TIRADO-CNP 2025 10:47 AM          "

## 2025-07-22 NOTE — PROGRESS NOTES
Referral sent to pts foc [facility] who replied by stating: We can accept patient pending updated PT note with out of bed activity trialed. Once and updated note is in from PT it will be sent to facility for further review. No precert is needed however pt would not be able to go to a facility until 7/23.    Full acceptance from [facility] is currently pending.      07/22/25 1402   Discharge Planning   Expected Discharge Disposition SNF

## 2025-07-22 NOTE — ASSESSMENT & PLAN NOTE
Mobilize with physical therapy.  Aspirin for anticoagulation.  Social work for placement.  Monitor hemoglobin.

## 2025-07-22 NOTE — PROGRESS NOTES
" Adriana Duran is a 80 y.o. female on day 2 of admission presenting with Closed displaced intertrochanteric fracture of right femur, initial encounter.    Subjective   Pain well-controlled       Objective     Physical Exam  Dressing clean.  Calves soft.  Negative Homans.  Neurovascular intact distally.  Last Recorded Vitals  Blood pressure 113/57, pulse 77, temperature 36.8 °C (98.3 °F), temperature source Oral, resp. rate 18, height 1.626 m (5' 4\"), weight 81.2 kg (179 lb 0.2 oz), SpO2 98%.  Intake/Output last 3 Shifts:  I/O last 3 completed shifts:  In: 3689 (45.4 mL/kg) [P.O.:460; I.V.:2230 (27.5 mL/kg); IV Piggyback:999]  Out: 1100 (13.5 mL/kg) [Urine:1050 (0.4 mL/kg/hr); Blood:50]  Weight: 81.2 kg     Relevant Results    Hemoglobin 7.6 CT scan of abdomen pelvis demonstrates intramuscular hematoma formation consistent with recent surgery.  Postoperative changes  Scheduled medications  Scheduled Medications[1]  Continuous medications  Continuous Medications[2]  PRN medications  PRN Medications[3]  Results for orders placed or performed during the hospital encounter of 07/20/25 (from the past 24 hours)   POCT GLUCOSE   Result Value Ref Range    POCT Glucose 137 (H) 74 - 99 mg/dL   CBC   Result Value Ref Range    WBC 7.1 4.4 - 11.3 x10*3/uL    nRBC 0.0 0.0 - 0.0 /100 WBCs    RBC 2.53 (L) 4.00 - 5.20 x10*6/uL    Hemoglobin 7.6 (L) 12.0 - 16.0 g/dL    Hematocrit 24.2 (L) 36.0 - 46.0 %    MCV 96 80 - 100 fL    MCH 30.0 26.0 - 34.0 pg    MCHC 31.4 (L) 32.0 - 36.0 g/dL    RDW 15.8 (H) 11.5 - 14.5 %    Platelets 119 (L) 150 - 450 x10*3/uL   Basic Metabolic Panel   Result Value Ref Range    Glucose 88 74 - 99 mg/dL    Sodium 138 136 - 145 mmol/L    Potassium 4.2 3.5 - 5.3 mmol/L    Chloride 106 98 - 107 mmol/L    Bicarbonate 26 21 - 32 mmol/L    Anion Gap 10 10 - 20 mmol/L    Urea Nitrogen 19 6 - 23 mg/dL    Creatinine 1.01 0.50 - 1.05 mg/dL    eGFR 56 (L) >60 mL/min/1.73m*2    Calcium 8.4 (L) 8.6 - 10.3 mg/dL   Iron " and TIBC   Result Value Ref Range    Iron 15 (L) 35 - 150 ug/dL    UIBC 199 110 - 370 ug/dL    TIBC 214 (L) 240 - 445 ug/dL    % Saturation 7 (L) 25 - 45 %   Ferritin   Result Value Ref Range    Ferritin 105 8 - 150 ng/mL   POCT GLUCOSE   Result Value Ref Range    POCT Glucose 98 74 - 99 mg/dL   Vitamin B12   Result Value Ref Range    Vitamin B12 1,135 (H) 211 - 911 pg/mL   Folate   Result Value Ref Range    Folate, Serum 10.7 >5.0 ng/mL   POCT GLUCOSE   Result Value Ref Range    POCT Glucose 170 (H) 74 - 99 mg/dL   POCT GLUCOSE   Result Value Ref Range    POCT Glucose 98 74 - 99 mg/dL                            Assessment & Plan  Closed displaced intertrochanteric fracture of right femur, initial encounter  Mobilize with physical therapy.  Aspirin for anticoagulation.  Social work for placement.  Monitor hemoglobin.  CKD (chronic kidney disease)    Type 2 diabetes mellitus        I spent 10 minutes in the professional and overall care of this patient.      Beltran Bravo MD           [1] acetaminophen, 650 mg, oral, q6h GLENDA  aspirin, 325 mg, oral, BID  atorvastatin, 20 mg, oral, Nightly  busPIRone, 10 mg, oral, TID  cetirizine, 10 mg, oral, Nightly  citalopram, 40 mg, oral, Nightly  docusate sodium, 100 mg, oral, BID  donepezil, 5 mg, oral, Nightly  DULoxetine, 60 mg, oral, Daily  fluticasone, 2 spray, Each Nostril, Daily  insulin lispro, 0-10 Units, subcutaneous, TID AC  lidocaine, 1 patch, transdermal, Daily  [Held by provider] losartan, 100 mg, oral, Daily  OLANZapine, 2.5 mg, oral, Nightly  pantoprazole, 40 mg, intravenous, BID  polyethylene glycol, 17 g, oral, Daily  psyllium, 1 packet, oral, Daily    [2] oxygen, 2 L/min, Last Rate: 2 L/min (07/21/25 1500)  sodium chloride 0.9%, 80 mL/hr, Last Rate: 80 mL/hr (07/22/25 1324)    [3] PRN medications: acetaminophen **OR** acetaminophen **OR** acetaminophen, benzocaine-menthol, HYDROcodone-acetaminophen, HYDROmorphone, melatonin, naloxone, ondansetron ODT **OR**  ondansetron

## 2025-07-22 NOTE — SIGNIFICANT EVENT
CT of the abdomen pelvis showed hematoma at right ORIF surgical site.  This most likely is the cause of her anemia.  Dr. Wu has been notified.

## 2025-07-23 LAB
ABO GROUP (TYPE) IN BLOOD: NORMAL
ABO GROUP (TYPE) IN BLOOD: NORMAL
ANION GAP SERPL CALCULATED.3IONS-SCNC: 8 MMOL/L (ref 10–20)
ANTIBODY SCREEN: NORMAL
BLOOD EXPIRATION DATE: NORMAL
BUN SERPL-MCNC: 19 MG/DL (ref 6–23)
CALCIUM SERPL-MCNC: 8.3 MG/DL (ref 8.6–10.3)
CHLORIDE SERPL-SCNC: 110 MMOL/L (ref 98–107)
CO2 SERPL-SCNC: 26 MMOL/L (ref 21–32)
CREAT SERPL-MCNC: 0.88 MG/DL (ref 0.5–1.05)
DISPENSE STATUS: NORMAL
EGFRCR SERPLBLD CKD-EPI 2021: 66 ML/MIN/1.73M*2
ERYTHROCYTE [DISTWIDTH] IN BLOOD BY AUTOMATED COUNT: 15.8 % (ref 11.5–14.5)
GLUCOSE BLD MANUAL STRIP-MCNC: 106 MG/DL (ref 74–99)
GLUCOSE BLD MANUAL STRIP-MCNC: 117 MG/DL (ref 74–99)
GLUCOSE BLD MANUAL STRIP-MCNC: 90 MG/DL (ref 74–99)
GLUCOSE BLD MANUAL STRIP-MCNC: 93 MG/DL (ref 74–99)
GLUCOSE SERPL-MCNC: 79 MG/DL (ref 74–99)
HCT VFR BLD AUTO: 21.3 % (ref 36–46)
HCT VFR BLD AUTO: 26.4 % (ref 36–46)
HGB BLD-MCNC: 6.6 G/DL (ref 12–16)
HGB BLD-MCNC: 8.5 G/DL (ref 12–16)
MCH RBC QN AUTO: 29.7 PG (ref 26–34)
MCHC RBC AUTO-ENTMCNC: 31 G/DL (ref 32–36)
MCV RBC AUTO: 96 FL (ref 80–100)
NRBC BLD-RTO: 0 /100 WBCS (ref 0–0)
PLATELET # BLD AUTO: 104 X10*3/UL (ref 150–450)
POTASSIUM SERPL-SCNC: 3.9 MMOL/L (ref 3.5–5.3)
PRODUCT BLOOD TYPE: 5100
PRODUCT CODE: NORMAL
RBC # BLD AUTO: 2.22 X10*6/UL (ref 4–5.2)
RH FACTOR (ANTIGEN D): NORMAL
RH FACTOR (ANTIGEN D): NORMAL
SODIUM SERPL-SCNC: 140 MMOL/L (ref 136–145)
UNIT ABO: NORMAL
UNIT NUMBER: NORMAL
UNIT RH: NORMAL
UNIT VOLUME: 350
WBC # BLD AUTO: 4.9 X10*3/UL (ref 4.4–11.3)
XM INTEP: NORMAL

## 2025-07-23 PROCEDURE — 2500000002 HC RX 250 W HCPCS SELF ADMINISTERED DRUGS (ALT 637 FOR MEDICARE OP, ALT 636 FOR OP/ED): Performed by: NURSE PRACTITIONER

## 2025-07-23 PROCEDURE — 86923 COMPATIBILITY TEST ELECTRIC: CPT

## 2025-07-23 PROCEDURE — 80048 BASIC METABOLIC PNL TOTAL CA: CPT | Performed by: NURSE PRACTITIONER

## 2025-07-23 PROCEDURE — 36415 COLL VENOUS BLD VENIPUNCTURE: CPT | Performed by: NURSE PRACTITIONER

## 2025-07-23 PROCEDURE — 1100000001 HC PRIVATE ROOM DAILY

## 2025-07-23 PROCEDURE — 2500000004 HC RX 250 GENERAL PHARMACY W/ HCPCS (ALT 636 FOR OP/ED): Performed by: NURSE PRACTITIONER

## 2025-07-23 PROCEDURE — 2500000001 HC RX 250 WO HCPCS SELF ADMINISTERED DRUGS (ALT 637 FOR MEDICARE OP): Performed by: NURSE PRACTITIONER

## 2025-07-23 PROCEDURE — 2500000005 HC RX 250 GENERAL PHARMACY W/O HCPCS: Performed by: NURSE PRACTITIONER

## 2025-07-23 PROCEDURE — 85018 HEMOGLOBIN: CPT | Performed by: NURSE PRACTITIONER

## 2025-07-23 PROCEDURE — 85027 COMPLETE CBC AUTOMATED: CPT | Performed by: NURSE PRACTITIONER

## 2025-07-23 PROCEDURE — P9016 RBC LEUKOCYTES REDUCED: HCPCS

## 2025-07-23 PROCEDURE — 36430 TRANSFUSION BLD/BLD COMPNT: CPT

## 2025-07-23 PROCEDURE — 99233 SBSQ HOSP IP/OBS HIGH 50: CPT | Performed by: NURSE PRACTITIONER

## 2025-07-23 PROCEDURE — 82947 ASSAY GLUCOSE BLOOD QUANT: CPT

## 2025-07-23 PROCEDURE — 86900 BLOOD TYPING SEROLOGIC ABO: CPT | Performed by: NURSE PRACTITIONER

## 2025-07-23 RX ADMIN — ATORVASTATIN CALCIUM 20 MG: 20 TABLET, FILM COATED ORAL at 20:44

## 2025-07-23 RX ADMIN — BUSPIRONE HYDROCHLORIDE 10 MG: 10 TABLET ORAL at 20:44

## 2025-07-23 RX ADMIN — DONEPEZIL HYDROCHLORIDE 5 MG: 5 TABLET, FILM COATED ORAL at 20:44

## 2025-07-23 RX ADMIN — ASPIRIN 325 MG: 325 TABLET, COATED ORAL at 08:55

## 2025-07-23 RX ADMIN — ACETAMINOPHEN 650 MG: 325 TABLET ORAL at 17:43

## 2025-07-23 RX ADMIN — CETIRIZINE HYDROCHLORIDE 10 MG: 10 TABLET, FILM COATED ORAL at 20:43

## 2025-07-23 RX ADMIN — BUSPIRONE HYDROCHLORIDE 10 MG: 10 TABLET ORAL at 08:55

## 2025-07-23 RX ADMIN — PANTOPRAZOLE SODIUM 40 MG: 40 INJECTION, POWDER, LYOPHILIZED, FOR SOLUTION INTRAVENOUS at 08:56

## 2025-07-23 RX ADMIN — DOCUSATE SODIUM 100 MG: 100 CAPSULE, LIQUID FILLED ORAL at 08:54

## 2025-07-23 RX ADMIN — BUSPIRONE HYDROCHLORIDE 10 MG: 10 TABLET ORAL at 16:30

## 2025-07-23 RX ADMIN — POLYETHYLENE GLYCOL 3350 17 G: 17 POWDER, FOR SOLUTION ORAL at 08:55

## 2025-07-23 RX ADMIN — CITALOPRAM 40 MG: 40 TABLET, FILM COATED ORAL at 20:43

## 2025-07-23 RX ADMIN — PANTOPRAZOLE SODIUM 40 MG: 40 INJECTION, POWDER, LYOPHILIZED, FOR SOLUTION INTRAVENOUS at 20:43

## 2025-07-23 RX ADMIN — LIDOCAINE 4% 1 PATCH: 40 PATCH TOPICAL at 08:56

## 2025-07-23 RX ADMIN — PSYLLIUM HUSK 1 PACKET: 3.4 POWDER ORAL at 08:56

## 2025-07-23 RX ADMIN — DOCUSATE SODIUM 100 MG: 100 CAPSULE, LIQUID FILLED ORAL at 20:43

## 2025-07-23 RX ADMIN — DULOXETINE 60 MG: 60 CAPSULE, DELAYED RELEASE ORAL at 08:55

## 2025-07-23 RX ADMIN — OLANZAPINE 2.5 MG: 2.5 TABLET, FILM COATED ORAL at 20:43

## 2025-07-23 RX ADMIN — FLUTICASONE PROPIONATE 2 SPRAY: 50 SPRAY, METERED NASAL at 08:56

## 2025-07-23 RX ADMIN — ACETAMINOPHEN 650 MG: 325 TABLET ORAL at 11:59

## 2025-07-23 RX ADMIN — ASPIRIN 325 MG: 325 TABLET, COATED ORAL at 20:46

## 2025-07-23 ASSESSMENT — PAIN - FUNCTIONAL ASSESSMENT
PAIN_FUNCTIONAL_ASSESSMENT: 0-10
PAIN_FUNCTIONAL_ASSESSMENT: 0-10

## 2025-07-23 ASSESSMENT — COGNITIVE AND FUNCTIONAL STATUS - GENERAL
CLIMB 3 TO 5 STEPS WITH RAILING: A LITTLE
PERSONAL GROOMING: A LITTLE
TOILETING: A LITTLE
TURNING FROM BACK TO SIDE WHILE IN FLAT BAD: A LITTLE
DAILY ACTIVITIY SCORE: 19
WALKING IN HOSPITAL ROOM: A LITTLE
DRESSING REGULAR LOWER BODY CLOTHING: A LITTLE
DRESSING REGULAR UPPER BODY CLOTHING: A LITTLE
MOBILITY SCORE: 19
HELP NEEDED FOR BATHING: A LITTLE
STANDING UP FROM CHAIR USING ARMS: A LITTLE
MOVING TO AND FROM BED TO CHAIR: A LITTLE

## 2025-07-23 ASSESSMENT — PAIN SCALES - GENERAL
PAINLEVEL_OUTOF10: 0 - NO PAIN
PAINLEVEL_OUTOF10: 0 - NO PAIN

## 2025-07-23 NOTE — PROGRESS NOTES
Referral sent to pts foc [facility] who replied by stating: We can accept patient pending updated PT note with out of bed activity trialed. Once and updated note is in from PT it will be sent to facility for further review. No precert is needed      07/23/25 0810   Discharge Planning   Expected Discharge Disposition SNF

## 2025-07-23 NOTE — NURSING NOTE
Assumed care of patient. Patient resting in bed during bedside shift report. Call light within reach. No concerns at this time. Plan of care ongoing.

## 2025-07-23 NOTE — CARE PLAN
The patient's goals for the shift include rest    The clinical goals for the shift include pt will be free of falls and injury this shift    Problem: Pain - Adult  Goal: Verbalizes/displays adequate comfort level or baseline comfort level  Outcome: Progressing     Problem: Safety - Adult  Goal: Free from fall injury  Outcome: Progressing     Problem: Discharge Planning  Goal: Discharge to home or other facility with appropriate resources  Outcome: Progressing     Problem: Chronic Conditions and Co-morbidities  Goal: Patient's chronic conditions and co-morbidity symptoms are monitored and maintained or improved  Outcome: Progressing     Problem: Nutrition  Goal: Nutrient intake appropriate for maintaining nutritional needs  Outcome: Progressing     Problem: Skin  Goal: Decreased wound size/increased tissue granulation at next dressing change  7/22/2025 2038 by Keily LESTER RN  Flowsheets (Taken 7/22/2025 2038)  Decreased wound size/increased tissue granulation at next dressing change:   Protective dressings over bony prominences   Utilize specialty bed per algorithm  7/22/2025 2038 by Keily LESTER RN  Outcome: Progressing  Flowsheets (Taken 7/22/2025 2038)  Decreased wound size/increased tissue granulation at next dressing change:   Protective dressings over bony prominences   Utilize specialty bed per algorithm  Goal: Participates in plan/prevention/treatment measures  Outcome: Progressing  Goal: Prevent/manage excess moisture  Outcome: Progressing  Goal: Prevent/minimize sheer/friction injuries  Outcome: Progressing  Goal: Promote/optimize nutrition  Outcome: Progressing  Goal: Promote skin healing  Outcome: Progressing     Problem: Diabetes  Goal: Achieve decreasing blood glucose levels by end of shift  Outcome: Progressing  Goal: Increase stability of blood glucose readings by end of shift  Outcome: Progressing  Goal: Decrease in ketones present in urine by end of shift  Outcome: Progressing  Goal:  Maintain electrolyte levels within acceptable range throughout shift  Outcome: Progressing  Goal: Maintain glucose levels >70mg/dl to <250mg/dl throughout shift  Outcome: Progressing  Goal: No changes in neurological exam by end of shift  Outcome: Progressing  Goal: Learn about and adhere to nutrition recommendations by end of shift  Outcome: Progressing  Goal: Vital signs within normal range for age by end of shift  Outcome: Progressing  Goal: Increase self care and/or family involovement by end of shift  Outcome: Progressing  Goal: Receive DSME education by end of shift  Outcome: Progressing     Problem: Pain  Goal: Takes deep breaths with improved pain control throughout the shift  Outcome: Progressing  Goal: Turns in bed with improved pain control throughout the shift  Outcome: Progressing  Goal: Walks with improved pain control throughout the shift  Outcome: Progressing  Goal: Performs ADL's with improved pain control throughout shift  Outcome: Progressing  Goal: Participates in PT with improved pain control throughout the shift  Outcome: Progressing  Goal: Free from opioid side effects throughout the shift  Outcome: Progressing  Goal: Free from acute confusion related to pain meds throughout the shift  Outcome: Progressing

## 2025-07-23 NOTE — ASSESSMENT & PLAN NOTE
Status post right femur ORIF postop day 2  IV fluids  Orthopedics following  PT/OT   Care management for placement  Anemia  Hemoglobin 6.6 this morning will transfuse 1 unit of packed red blood cells  IV Protonix twice daily  Check iron studies  Gastroenterology has signed off  CT showed postop hematoma which is likely the issue  Type 2 diabetes mellitus  Hypoglycemia protocol  Accu-Cheks AC and at bedtime  Insulin sliding scale  Chronic kidney disease  Currently stable, creatinine 1.24  DVT prophylaxis  Sequential compression  Disposition  Will go to [facility] once hemoglobin is stable  Likely 1 to 2 days

## 2025-07-23 NOTE — PROGRESS NOTES
Adriana Duran is a 80 y.o. female on day 3 of admission presenting with Closed displaced intertrochanteric fracture of right femur, initial encounter.      Subjective   Patient examined sitting up in bed.  Patient states that her pain is controlled well with current pain control regimen.  Her  was at bedside.       Objective     Last Recorded Vitals  BP (!) 106/49 (BP Location: Right arm, Patient Position: Lying)   Pulse 75   Temp 36.6 °C (97.8 °F) (Axillary)   Resp 16   Wt 82.5 kg (181 lb 14.1 oz)   SpO2 94%   Intake/Output last 3 Shifts:    Intake/Output Summary (Last 24 hours) at 7/23/2025 1028  Last data filed at 7/23/2025 1012  Gross per 24 hour   Intake 600 ml   Output 700 ml   Net -100 ml       Admission Weight  Weight: 75 kg (165 lb 5.5 oz) (07/20/25 1721)    Daily Weight  07/23/25 : 82.5 kg (181 lb 14.1 oz)          Physical Exam    Constitutional: No acute distress, calm, cooperative  HEENT: PERRL, normocephalic, atraumatic, mucous membranes moist  Cardiovascular: Regular rhythm and rate,   Respiratory: Lungs clear to auscultation,   Gastrointestinal: Bowel sounds positive x 4, soft, nontender  Neurologic: Alert and oriented x 3 equal strength bilaterally  Musculoskeletal: Limited movement right leg due to ORIF  Skin: Warm, dry and intact                            Assessment & Plan  Closed displaced intertrochanteric fracture of right femur, initial encounter  Status post right femur ORIF postop day 2  IV fluids  Orthopedics following  PT/OT   Care management for placement  Anemia  Hemoglobin 6.6 this morning will transfuse 1 unit of packed red blood cells  IV Protonix twice daily  Check iron studies  Gastroenterology has signed off  CT showed postop hematoma which is likely the issue  Type 2 diabetes mellitus  Hypoglycemia protocol  Accu-Cheks AC and at bedtime  Insulin sliding scale  Chronic kidney disease  Currently stable, creatinine 1.24  DVT prophylaxis  Sequential  compression  Disposition  Will go to [facility] once hemoglobin is stable  Likely 1 to 2 days             Dionte Farley, APRN-CNP

## 2025-07-23 NOTE — PROGRESS NOTES
Physical Therapy                 Therapy Communication Note    Patient Name: Adriana Duran  MRN: [id number]  Department: [address]  Room: [address] [place] [address]  Today's Date: 7/23/2025     Discipline: Physical Therapy    Missed Visit:       Missed Visit Reason: Missed Visit Reason: Cancel (Hgb 6.6;  Hct 21.3;  orders for blood transfusion.)

## 2025-07-23 NOTE — PROGRESS NOTES
Occupational Therapy                 Therapy Communication Note    Patient Name: Adriana Duran  MRN: [id number]  Department: [address]  Room: [address] [place] [address]  Today's Date: 7/23/2025     Discipline: Occupational Therapy    Missed Visit: OT Missed Visit: Yes     Missed Visit Reason: Missed Visit Reason: Cancel (pt H&H 6.6 and 21.3 and is not therapeutic at this time. Per protocol, will cancel OT)    Missed Time: Cancel

## 2025-07-24 LAB
ANION GAP SERPL CALCULATED.3IONS-SCNC: 10 MMOL/L (ref 10–20)
BUN SERPL-MCNC: 20 MG/DL (ref 6–23)
CALCIUM SERPL-MCNC: 8.6 MG/DL (ref 8.6–10.3)
CHLORIDE SERPL-SCNC: 107 MMOL/L (ref 98–107)
CO2 SERPL-SCNC: 27 MMOL/L (ref 21–32)
CREAT SERPL-MCNC: 0.93 MG/DL (ref 0.5–1.05)
EGFRCR SERPLBLD CKD-EPI 2021: 62 ML/MIN/1.73M*2
ERYTHROCYTE [DISTWIDTH] IN BLOOD BY AUTOMATED COUNT: 15.4 % (ref 11.5–14.5)
GLUCOSE BLD MANUAL STRIP-MCNC: 125 MG/DL (ref 74–99)
GLUCOSE BLD MANUAL STRIP-MCNC: 169 MG/DL (ref 74–99)
GLUCOSE BLD MANUAL STRIP-MCNC: 95 MG/DL (ref 74–99)
GLUCOSE BLD MANUAL STRIP-MCNC: 95 MG/DL (ref 74–99)
GLUCOSE SERPL-MCNC: 79 MG/DL (ref 74–99)
HCT VFR BLD AUTO: 26.4 % (ref 36–46)
HGB BLD-MCNC: 8.5 G/DL (ref 12–16)
MCH RBC QN AUTO: 29.8 PG (ref 26–34)
MCHC RBC AUTO-ENTMCNC: 32.2 G/DL (ref 32–36)
MCV RBC AUTO: 93 FL (ref 80–100)
NRBC BLD-RTO: 0 /100 WBCS (ref 0–0)
PLATELET # BLD AUTO: 120 X10*3/UL (ref 150–450)
POTASSIUM SERPL-SCNC: 3.8 MMOL/L (ref 3.5–5.3)
RBC # BLD AUTO: 2.85 X10*6/UL (ref 4–5.2)
SODIUM SERPL-SCNC: 140 MMOL/L (ref 136–145)
WBC # BLD AUTO: 4.6 X10*3/UL (ref 4.4–11.3)

## 2025-07-24 PROCEDURE — 2500000002 HC RX 250 W HCPCS SELF ADMINISTERED DRUGS (ALT 637 FOR MEDICARE OP, ALT 636 FOR OP/ED): Performed by: NURSE PRACTITIONER

## 2025-07-24 PROCEDURE — 2500000001 HC RX 250 WO HCPCS SELF ADMINISTERED DRUGS (ALT 637 FOR MEDICARE OP): Performed by: NURSE PRACTITIONER

## 2025-07-24 PROCEDURE — 97530 THERAPEUTIC ACTIVITIES: CPT | Mod: GP

## 2025-07-24 PROCEDURE — 36415 COLL VENOUS BLD VENIPUNCTURE: CPT | Performed by: NURSE PRACTITIONER

## 2025-07-24 PROCEDURE — 1100000001 HC PRIVATE ROOM DAILY

## 2025-07-24 PROCEDURE — 85027 COMPLETE CBC AUTOMATED: CPT | Performed by: NURSE PRACTITIONER

## 2025-07-24 PROCEDURE — 99239 HOSP IP/OBS DSCHRG MGMT >30: CPT | Performed by: NURSE PRACTITIONER

## 2025-07-24 PROCEDURE — 97530 THERAPEUTIC ACTIVITIES: CPT | Mod: GO

## 2025-07-24 PROCEDURE — 80048 BASIC METABOLIC PNL TOTAL CA: CPT | Performed by: NURSE PRACTITIONER

## 2025-07-24 PROCEDURE — 2500000005 HC RX 250 GENERAL PHARMACY W/O HCPCS: Performed by: NURSE PRACTITIONER

## 2025-07-24 PROCEDURE — 82947 ASSAY GLUCOSE BLOOD QUANT: CPT

## 2025-07-24 PROCEDURE — 2500000004 HC RX 250 GENERAL PHARMACY W/ HCPCS (ALT 636 FOR OP/ED): Performed by: NURSE PRACTITIONER

## 2025-07-24 RX ORDER — HYDROCODONE BITARTRATE AND ACETAMINOPHEN 5; 325 MG/1; MG/1
1 TABLET ORAL EVERY 4 HOURS PRN
Qty: 20 TABLET | Refills: 0 | Status: SHIPPED | OUTPATIENT
Start: 2025-07-24 | End: 2025-07-29

## 2025-07-24 RX ORDER — LOSARTAN POTASSIUM 100 MG/1
100 TABLET ORAL DAILY
Start: 2025-07-25

## 2025-07-24 RX ADMIN — ACETAMINOPHEN 650 MG: 325 TABLET ORAL at 23:58

## 2025-07-24 RX ADMIN — BUSPIRONE HYDROCHLORIDE 10 MG: 10 TABLET ORAL at 09:39

## 2025-07-24 RX ADMIN — PANTOPRAZOLE SODIUM 40 MG: 40 INJECTION, POWDER, LYOPHILIZED, FOR SOLUTION INTRAVENOUS at 20:43

## 2025-07-24 RX ADMIN — ATORVASTATIN CALCIUM 20 MG: 20 TABLET, FILM COATED ORAL at 20:44

## 2025-07-24 RX ADMIN — CITALOPRAM 40 MG: 40 TABLET, FILM COATED ORAL at 20:44

## 2025-07-24 RX ADMIN — DONEPEZIL HYDROCHLORIDE 5 MG: 5 TABLET, FILM COATED ORAL at 20:44

## 2025-07-24 RX ADMIN — LIDOCAINE 4% 1 PATCH: 40 PATCH TOPICAL at 09:39

## 2025-07-24 RX ADMIN — OLANZAPINE 2.5 MG: 2.5 TABLET, FILM COATED ORAL at 20:44

## 2025-07-24 RX ADMIN — PSYLLIUM HUSK 1 PACKET: 3.4 POWDER ORAL at 09:39

## 2025-07-24 RX ADMIN — ASPIRIN 325 MG: 325 TABLET, COATED ORAL at 09:39

## 2025-07-24 RX ADMIN — CETIRIZINE HYDROCHLORIDE 10 MG: 10 TABLET, FILM COATED ORAL at 20:44

## 2025-07-24 RX ADMIN — DULOXETINE 60 MG: 60 CAPSULE, DELAYED RELEASE ORAL at 09:39

## 2025-07-24 RX ADMIN — BUSPIRONE HYDROCHLORIDE 10 MG: 10 TABLET ORAL at 20:44

## 2025-07-24 RX ADMIN — ACETAMINOPHEN 650 MG: 325 TABLET ORAL at 19:06

## 2025-07-24 RX ADMIN — POLYETHYLENE GLYCOL 3350 17 G: 17 POWDER, FOR SOLUTION ORAL at 09:39

## 2025-07-24 RX ADMIN — DOCUSATE SODIUM 100 MG: 100 CAPSULE, LIQUID FILLED ORAL at 09:39

## 2025-07-24 RX ADMIN — ACETAMINOPHEN 650 MG: 325 TABLET ORAL at 06:03

## 2025-07-24 RX ADMIN — FLUTICASONE PROPIONATE 2 SPRAY: 50 SPRAY, METERED NASAL at 09:40

## 2025-07-24 RX ADMIN — BUSPIRONE HYDROCHLORIDE 10 MG: 10 TABLET ORAL at 16:26

## 2025-07-24 RX ADMIN — ASPIRIN 325 MG: 325 TABLET, COATED ORAL at 20:44

## 2025-07-24 RX ADMIN — PANTOPRAZOLE SODIUM 40 MG: 40 INJECTION, POWDER, LYOPHILIZED, FOR SOLUTION INTRAVENOUS at 09:39

## 2025-07-24 RX ADMIN — DOCUSATE SODIUM 100 MG: 100 CAPSULE, LIQUID FILLED ORAL at 20:44

## 2025-07-24 RX ADMIN — ACETAMINOPHEN 650 MG: 325 TABLET ORAL at 13:21

## 2025-07-24 ASSESSMENT — COGNITIVE AND FUNCTIONAL STATUS - GENERAL
TOILETING: TOTAL
MOVING FROM LYING ON BACK TO SITTING ON SIDE OF FLAT BED WITH BEDRAILS: A LOT
STANDING UP FROM CHAIR USING ARMS: A LITTLE
HELP NEEDED FOR BATHING: A LITTLE
MOBILITY SCORE: 17
HELP NEEDED FOR BATHING: A LOT
CLIMB 3 TO 5 STEPS WITH RAILING: TOTAL
CLIMB 3 TO 5 STEPS WITH RAILING: A LOT
TURNING FROM BACK TO SIDE WHILE IN FLAT BAD: A LOT
DRESSING REGULAR UPPER BODY CLOTHING: A LITTLE
MOVING FROM LYING ON BACK TO SITTING ON SIDE OF FLAT BED WITH BEDRAILS: A LITTLE
MOVING TO AND FROM BED TO CHAIR: A LITTLE
WALKING IN HOSPITAL ROOM: TOTAL
TURNING FROM BACK TO SIDE WHILE IN FLAT BAD: A LITTLE
TOILETING: A LITTLE
DAILY ACTIVITIY SCORE: 13
MOVING TO AND FROM BED TO CHAIR: TOTAL
PERSONAL GROOMING: A LITTLE
STANDING UP FROM CHAIR USING ARMS: A LOT
PERSONAL GROOMING: A LITTLE
DRESSING REGULAR LOWER BODY CLOTHING: TOTAL
WALKING IN HOSPITAL ROOM: A LITTLE
DRESSING REGULAR UPPER BODY CLOTHING: A LITTLE
DAILY ACTIVITIY SCORE: 19
EATING MEALS: A LITTLE
MOBILITY SCORE: 9
DRESSING REGULAR LOWER BODY CLOTHING: A LITTLE

## 2025-07-24 ASSESSMENT — PAIN - FUNCTIONAL ASSESSMENT
PAIN_FUNCTIONAL_ASSESSMENT: 0-10

## 2025-07-24 NOTE — CARE PLAN
Problem: Pain - Adult  Goal: Verbalizes/displays adequate comfort level or baseline comfort level  Outcome: Progressing     Problem: Safety - Adult  Goal: Free from fall injury  Outcome: Progressing     Problem: Discharge Planning  Goal: Discharge to home or other facility with appropriate resources  Outcome: Progressing     Problem: Chronic Conditions and Co-morbidities  Goal: Patient's chronic conditions and co-morbidity symptoms are monitored and maintained or improved  Outcome: Progressing     Problem: Nutrition  Goal: Nutrient intake appropriate for maintaining nutritional needs  Outcome: Progressing     Problem: Skin  Goal: Decreased wound size/increased tissue granulation at next dressing change  Outcome: Progressing  Goal: Participates in plan/prevention/treatment measures  Outcome: Progressing  Goal: Prevent/manage excess moisture  Outcome: Progressing  Goal: Prevent/minimize sheer/friction injuries  Outcome: Progressing  Goal: Promote/optimize nutrition  Outcome: Progressing  Goal: Promote skin healing  Outcome: Progressing     Problem: Diabetes  Goal: Achieve decreasing blood glucose levels by end of shift  Outcome: Progressing  Goal: Increase stability of blood glucose readings by end of shift  Outcome: Progressing  Goal: Decrease in ketones present in urine by end of shift  Outcome: Progressing  Goal: Maintain electrolyte levels within acceptable range throughout shift  Outcome: Progressing  Goal: Maintain glucose levels >70mg/dl to <250mg/dl throughout shift  Outcome: Progressing  Goal: No changes in neurological exam by end of shift  Outcome: Progressing  Goal: Learn about and adhere to nutrition recommendations by end of shift  Outcome: Progressing  Goal: Vital signs within normal range for age by end of shift  Outcome: Progressing  Goal: Increase self care and/or family involovement by end of shift  Outcome: Progressing  Goal: Receive DSME education by end of shift  Outcome: Progressing      Problem: Pain  Goal: Takes deep breaths with improved pain control throughout the shift  Outcome: Progressing  Goal: Turns in bed with improved pain control throughout the shift  Outcome: Progressing  Goal: Walks with improved pain control throughout the shift  Outcome: Progressing  Goal: Performs ADL's with improved pain control throughout shift  Outcome: Progressing  Goal: Participates in PT with improved pain control throughout the shift  Outcome: Progressing  Goal: Free from opioid side effects throughout the shift  Outcome: Progressing  Goal: Free from acute confusion related to pain meds throughout the shift  Outcome: Progressing

## 2025-07-24 NOTE — PROGRESS NOTES
07/24/25 1223   Discharge Planning   Home or Post Acute Services Post acute facilities (Rehab/SNF/etc)   Type of Post Acute Facility Services Rehab   Expected Discharge Disposition SNF  ([facility] is following and will require updates tomorrow before formally accepting due to blood transfusion today and the patient missed PTOT today.)     Dispo: [facility], pending formal acceptance likely tomorrow. No precert needed   RISHI tomorrow    Discharge plan NOT finalized. Please contact [facility] prior to attempting to discharge this patient. Thank you.

## 2025-07-24 NOTE — PROGRESS NOTES
Physical Therapy    Physical Therapy Treatment    Patient Name: Adriana Duran  MRN: [id number]  Department: [address]  Room: [facility]447-  Today's Date: 7/24/2025  Time Calculation  Start Time: 1335  Stop Time: 1402  Time Calculation (min): 27 min         Assessment/Plan   PT Assessment  Rehab Prognosis: Good  Barriers to Discharge Home: Caregiver assistance, Cognition needs, Physical needs  Caregiver Assistance: Caregiver assistance needed per identified barriers - however, level of patient's required assistance exceeds assistance available at home  Cognition Needs: 24hr supervision for safety awareness needed  Physical Needs: 24hr mobility assistance needed  Evaluation/Treatment Tolerance: Patient limited by fatigue, Patient limited by pain  End of Session Communication: Bedside nurse  Assessment Comment: Progressing slowly with functional mobility;  tolerance to activity improving slowly;  fall risk;  patient would benefit from skilled physical therapy services to safely maximize functional mobility to maximize functional mobility to modified independent levels.  End of Session Patient Position: Bed, 3 rail up, Alarm on  PT Plan  Inpatient/Swing Bed or Outpatient: Inpatient  PT Plan  Treatment/Interventions: Bed mobility, Transfer training, Balance training, Strengthening, Endurance training, Range of motion, Therapeutic exercise, Therapeutic activity, Gait training  PT Plan: Ongoing PT  PT Frequency: 6 times per week (during this acute care hospitalization)  PT Discharge Recommendations: Moderate intensity level of continued care (Based on curring functional status and rehab potential, patient is anticipated to tolerate and benefit from 5 or more days per week of skilled rehabilitative therapy after discharge from this acute inpatient hospitalization.)  PT Recommended Transfer Status: Assist x2  PT - OK to Discharge: Yes (with skilled physical therapy services at next level of care)    PT Visit Info:  PT Received  On: 07/24/25     General Visit Information:   General  Co-Treatment: OT  Co-Treatment Reason: Need for 2nd skilled person for therapeutic handling during functional mobility to maximize outcomes and safety.  Prior to Session Communication: Bedside nurse  Patient Position Received: Bed, 3 rail up, Alarm on  General Comment: RN cleared patient for treatment.  Patient reports agreeable to treatment.    Subjective   Precautions:  Precautions  LE Weight Bearing Status: Right Partial Weight Bearing (25% weightbearing right LE)  Medical Precautions: Fall precautions     Date/Time Vitals Session Patient Position Pulse Resp SpO2 BP MAP (mmHg)    07/24/25 1231 --  --  78  18  96 %  94/56  65            Objective   Pain:  Pain Assessment  Pain Assessment: 0-10  0-10 (Numeric) Pain Score: 2  Pain Type: Surgical pain, Acute pain  Pain Location: Hip  Pain Orientation: Right  Pain Interventions:  (Therapeutic functional mobility and ther ex)  Response to Interventions: No change in pain  Cognition:  Cognition  Overall Cognitive Status: Impaired (Memory deficits;  cooperative;  able to follow commands)  Coordination:  Movements are Fluid and Coordinated: No  Lower Body Coordination: Slower rate of movement ross LE (right greater than left)  Postural Control:  Static Sitting Balance  Static Sitting-Balance Support: Bilateral upper extremity supported  Static Sitting-Level of Assistance: Moderate assistance  Static Sitting-Comment/Number of Minutes: Intermittent assist required to maintain midline while sitting on side of bed due to decreased balance posterior  Static Standing Balance  Static Standing-Balance Support: Bilateral upper extremity supported  Static Standing-Level of Assistance: Maximum assistance (x 2)  Static Standing-Comment/Number of Minutes: Assist with trunk support, balance, and maintaining PWB right LE during static standing with walker    Activity Tolerance:  Activity Tolerance  Endurance: Decreased tolerance for  upright activites  Activity Tolerance Comments: Fatigue  Treatments:  Therapeutic Exercise  Therapeutic Exercise Performed: Yes  Therapeutic Exercise Activity 1: Ankle pumps, heel slides, hip abduction 5 reps x 1 set right LE with assist         Therapeutic Activity  Therapeutic Activity Performed: Yes  Therapeutic Activity 1: Static standing x 20 seconds with walker and max assist x 2 for trunk support, balance, and maintaining PWB right LE;  patient had difficulty with maintaining PWB right LE.         Bed Mobility  Bed Mobility: Yes  Bed Mobility 1  Bed Mobility 1: Supine to sitting  Level of Assistance 1:  (Max asssist x 1;  mod assist x 1)  Bed Mobility Comments 1: Assist with trunk-up and ross LE during supine to sit;  max assist with scooting hips to edge of bed during supine to sit.  Bed Mobility 2  Bed Mobility  2: Sitting to supine  Level of Assistance 2: Maximum assistance (x 2)  Bed Mobility Comments 2: Assist with trunk and ross LE    Ambulation/Gait Training  Ambulation/Gait Training Performed: No (Unable to safely ambulate while maintaining PWB right LE at this time)  Transfers  Transfer: Yes  Transfer 1  Transfer From 1: Sit to  Transfer to 1: Stand  Technique 1: Sit to stand  Transfer Device 1: Walker  Transfer Level of Assistance 1: Maximum assistance (x 2)  Trials/Comments 1: Assist with elevating buttocks from sitting surface and positioning COG over PORTIA during sit to stand from elevated sitting surface  Transfers 2  Transfer From 2: Stand to  Transfer to 2: Sit  Technique 2: Stand to sit  Transfer Device 2: Walker  Transfer Level of Assistance 2: Maximum assistance (x 2)  Trials/Comments 2: Assist with trunk and ross LE;  decreased eccentric control    Stairs  Stairs: No         Outcome Measures:  AMPAC Basic Mobility  Turning from your back to your side while in a flat bed without using bedrails: A lot  Moving from lying on your back to sitting on the side of a flat bed without using bedrails: A  lot  Moving to and from bed to chair (including a wheelchair): Total  Standing up from a chair using your arms (e.g. wheelchair or bedside chair): A lot  To walk in hospital room: Total  Climbing 3-5 steps with railing: Total  Basic Mobility - Total Score: 9    Education Documentation  No documentation found.  Education Comments  No comments found.        OP EDUCATION:       Encounter Problems       Encounter Problems (Active)       Mobility       Bed mobility including supine to sit and sit to supine with min assist. (Progressing)       Start:  07/22/25    Expected End:  08/05/25            Transfers including sit to stand and stand to sit with mod assist x 1-2 and partial weightbearing (25%) right LE. (Progressing)       Start:  07/22/25    Expected End:  08/05/25            Ambulate 5 feet with walker, mod assist x 2, and partial weightbearing (25%) right LE.       Start:  07/22/25    Expected End:  08/05/25               PT Transfers       Transfers including stand pivot bed to chair and chair to bed with walker, partial weightbearing (25%) right LE, and mod assist x 1-2.       Start:  07/22/25    Expected End:  08/05/25

## 2025-07-24 NOTE — NURSING NOTE
Patient alert and oriented. Night time medications given. Patient reports no pain. Repositioned in bed. Patient has no further needs at this time.

## 2025-07-24 NOTE — DISCHARGE SUMMARY
Discharge Diagnosis  Closed displaced intertrochanteric fracture of right femur, initial encounter  Right femur ORIF  Postoperative hematoma with anemia             Issues Requiring Follow-Up  Follow-up with primary care provider in 1 week  Follow-up with orthopedics in 1 month    Discharge Meds     Medication List      START taking these medications     HYDROcodone-acetaminophen 5-325 mg tablet; Commonly known as: Norco;   Take 1 tablet by mouth every 4 hours if needed for severe pain (7 - 10)   for up to 5 days.     CONTINUE taking these medications     acetaminophen 325 mg tablet; Commonly known as: Tylenol; Take 2 tablets   (650 mg) by mouth every 6 hours if needed for moderate pain (4 - 6).   Adult Low Dose Aspirin 81 mg EC tablet; Generic drug: aspirin   atorvastatin 20 mg tablet; Commonly known as: Lipitor; TAKE 1 TABLET BY   MOUTH ONCE  DAILY   busPIRone 10 mg tablet; Commonly known as: Buspar; Take 1 tablet (10 mg)   by mouth 3 times a day.   cetirizine 10 mg tablet; Commonly known as: ZyrTEC; Take 1 tablet (10   mg) by mouth once daily.   citalopram 40 mg tablet; Commonly known as: CeleXA   docusate sodium 100 mg capsule; Commonly known as: Colace   donepezil 5 mg tablet; Commonly known as: Aricept; Take 1 tablet (5 mg)   by mouth once daily at bedtime.   fluticasone 50 mcg/actuation nasal spray; Commonly known as: Flonase;   Administer 2 sprays into each nostril once daily. Shake gently. Before   first use, prime pump. After use, clean tip and replace cap.   losartan 100 mg tablet; Commonly known as: Cozaar; Take 1 tablet (100   mg) by mouth once daily.   multivitamin tablet   mupirocin 2 % ointment; Commonly known as: Bactroban; Apply topically 3   times a day for 10 days. apply to affected area   OLANZapine 2.5 mg tablet; Commonly known as: ZyPREXA; Take 1 tablet (2.5   mg) by mouth once daily at bedtime.   sodium bicarbonate 650 mg tablet   solifenacin 10 mg tablet; Commonly known as: VESIcare; Take 1  tablet (10   mg) by mouth once daily. Swallow tablet whole; do not crush, chew, or   split.   Vitamin B-12 1,000 mcg tablet; Generic drug: cyanocobalamin     STOP taking these medications     phenylephrine 0.25 % nasal spray; Commonly known as: Faisal-Synephrine     ASK your doctor about these medications     DULoxetine 60 mg DR capsule; Commonly known as: Cymbalta; Take 1 capsule   (60 mg) by mouth once daily. Do not crush or chew.       Test Results Pending At Discharge  Pending Labs       Order Current Status    Extra Urine Gray Tube In process    Urinalysis with Reflex Culture and Microscopic In process            Hospital Course     Adriana Duran is a 80 y.o. female presenting with mechanical fall.  Patient normally uses a walker, but was trying to place a glass across the room without this.  She fell onto her right side with pain, presented emergency department and found to have a right intertrochanteric fracture and case was discussed between ER and orthopedic surgery..  Patient follows with Dr. Shanice Levy and takes bicarbonate 650 mg 3 times a day.  Also follows with  primary care and is on aspirin once in the morning.  No other blood thinners.  She has been in her normal state of health over the last week.  She is on Aricept for cognitive decline and is on a lower dose of olanzapine 2.5 mg at night.  Her significant other states that she takes Colace every day and occasionally has to take milk of magnesia for constipation.     Pain is well-controlled currently after receiving morphine.  She has sensation bilateral lower extremities which is symmetric.    Underwent ORIF right proximal femur he is postop day 4 today.  She developed a postop hematoma and had anemia requiring blood transfusion.  This is now stable and orthopedics feels that she can be discharged and follow-up with them outpatient.  Patient is now stable and we will plan for discharge to SNF for rehab today    Pertinent Physical Exam At Time  of Discharge  Physical Exam  Constitutional: No acute distress, calm, cooperative  HEENT: PERRL, normocephalic, atraumatic, mucous membranes moist  Cardiovascular: Regular rhythm and rate,   Respiratory: Lungs clear to auscultation,   Gastrointestinal: Bowel sounds positive x 4, soft, nontender  Neurologic: Alert and oriented x 3 equal strength bilaterally  Musculoskeletal: Able to move all extremities, no edema  Skin: Right hip incision intact  Outpatient Follow-Up  Future Appointments   Date Time Provider Department Center   8/21/2025 11:40 AM Rafael Ruiz MD [id number] [facility]   11/12/2025  2:30 PM Tawanda Joel PA-C [id number] [facility]         CANDIDA Sanchez-CNP

## 2025-07-24 NOTE — CARE PLAN
Problem: Mobility  Goal: Bed mobility including supine to sit and sit to supine with min assist.  Outcome: Progressing  Goal: Transfers including sit to stand and stand to sit with mod assist x 1-2 and partial weightbearing (25%) right LE.  Outcome: Progressing

## 2025-07-24 NOTE — NURSING NOTE
Assume care of patient. BSSR complete. Pt lying quietly in bed watching tv. PCA at bedside obtaining VS. Rt hip assessed. Dressings intact, one small area of old drainage noted to proximal dressing. Assessment complete. Pt denies any needs at this time. Call light within reach, bed alarm engaged. Plan of care ongoing.

## 2025-07-24 NOTE — PROGRESS NOTES
Occupational Therapy    OT Treatment    Patient Name: Adriana Duran  MRN: [id number]  Department: [address]  Room: [facility]447-  Today's Date: 7/24/2025  Time Calculation  Start Time: 1340  Stop Time: 1406  Time Calculation (min): 26 min        Assessment:  OT Assessment: Pt cooperative in session this date. Pt would benefit from continued acute OT services to address deficits in ADLs, functional mobility, and transfers  Prognosis: Fair  Barriers to Discharge Home: Caregiver assistance, Cognition needs, Physical needs  Caregiver Assistance: Caregiver assistance needed per identified barriers - however, level of patient's required assistance exceeds assistance available at home  Cognition Needs: 24hr supervision for safety awareness needed, Cognition-related high falls risk  Physical Needs: 24hr mobility assistance needed, 24hr ADL assistance needed, High falls risk due to function or environment, Weight bearing precautions unable to be safely maintained  Evaluation/Treatment Tolerance: Patient limited by fatigue, Patient limited by pain  Medical Staff Made Aware: Yes  End of Session Communication: Bedside nurse  End of Session Patient Position: Bed, 3 rail up, Alarm on (all needs in reach, RN aware, family present)  OT Assessment Results: Decreased ADL status, Decreased safe judgment during ADL, Decreased cognition, Decreased endurance, Decreased functional mobility, Decreased trunk control for functional activities  Prognosis: Fair  Evaluation/Treatment Tolerance: Patient limited by fatigue, Patient limited by pain  Medical Staff Made Aware: Yes  Strengths: Support of extended family/friends  Barriers to Participation: Comorbidities  Plan:  Treatment Interventions: ADL retraining, Functional transfer training, UE strengthening/ROM, Endurance training, Equipment evaluation/education, Compensatory technique education  OT Frequency: 4 times per week (During this acute inpatient hospitalization)  OT Discharge  Recommendations: Moderate intensity level of continued care (Based on current functional status and rehab potential, patient is anticipated to tolerate and benefit from 5 or more days per week of skilled rehabilitative therapy after discharge from this acute inpatient hospitalization.)  Equipment Recommended upon Discharge: Wheelchair  OT Recommended Transfer Status:  (TBD)  OT - OK to Discharge: Yes  Treatment Interventions: ADL retraining, Functional transfer training, UE strengthening/ROM, Endurance training, Equipment evaluation/education, Compensatory technique education    Subjective   OT Visit Info:  OT Received On: 07/24/25  General Visit Info:  General  Reason for Referral: ADL impairment; 80 yr old female presenting with fall resulting in right intertrochanteric fracture now s/p ORIF on 7/21.  Referred By: Maykel Wu MD  Past Medical History Relevant to Rehab: history of Arthritis, Chronic kidney disease, Diabetes mellitus, Foot pain, left, Hypertension, Right knee pain, and UTI  Family/Caregiver Present: Yes  Caregiver Feedback:  (spouse and dtr)  Co-Treatment: PT  Co-Treatment Reason: to optimize pt safety, outcomes, and participation  Prior to Session Communication: Bedside nurse  Patient Position Received: Bed, 3 rail up, Alarm on  General Comment: Cleared by nursing. Pt pleasant and agreeable to therapy  Precautions:  LE Weight Bearing Status: Right Partial Weight Bearing (25% weightbearing right LE)  Medical Precautions: Fall precautions      Pain:  Pain Assessment  Pain Assessment: 0-10  0-10 (Numeric) Pain Score: 2  Pain Type: Acute pain, Surgical pain  Pain Location: Hip  Pain Orientation: Right  Pain Interventions: Repositioned (medication per nsg)  Response to Interventions: Resting quietly    Objective    Cognition:  Cognition  Overall Cognitive Status: Impaired  Following Commands: Follows one step commands with repetition  Activities of Daily Living:      Grooming  Grooming Comments: pt  reports she already complete grooming and bathing tasks this date        Bed Mobility/Transfers: Bed Mobility  Bed Mobility: Yes  Bed Mobility 1  Bed Mobility 1: Supine to sitting  Level of Assistance 1:  (max A x1 + mod A x1)  Bed Mobility Comments 1: mod A x1 for trunk upright + max A x1 for advancement of BLEs off bed supine>seated EOB with HOB elevated. max A for scooting hips towards EOB  Bed Mobility 2  Bed Mobility  2: Sitting to supine  Level of Assistance 2: Maximum assistance, +2  Bed Mobility Comments 2: max A x2 for controlled descent of trunk and assist with BLEs seated EOB>supine with HOB flat. max A x2 for boost towards HOB with use of draw sheet    Transfers  Transfer:  (max A x2 for balance and controlled descent sit<>stand bed level with bed height elevated. verbal and tactile cues to  maintain PWB 25% RLE)    Functional Mobility:  Functional Mobility  Functional Mobility Performed: No  Sitting Balance:  Static Sitting Balance  Static Sitting-Balance Support: Bilateral upper extremity supported, Feet supported  Static Sitting-Level of Assistance: Close supervision, Contact guard  Static Sitting-Comment/Number of Minutes: ~2-3 minutes     Therapy/Activity: Therapeutic Activity  Therapeutic Activity Performed:  (reviewed 25% WB RLE)      Outcome Measures:[facility] Daily Activity  Putting on and taking off regular lower body clothing: Total  Bathing (including washing, rinsing, drying): A lot  Putting on and taking off regular upper body clothing: A little  Toileting, which includes using toilet, bedpan or urinal: Total  Taking care of personal grooming such as brushing teeth: A little  Eating Meals: A little  Daily Activity - Total Score: 13    Education Documentation  Precautions, taught by Josette Huff OT at 7/24/2025  3:16 PM.  Learner: Patient  Readiness: Acceptance  Method: Explanation, Demonstration  Response: Needs Reinforcement, Verbalizes Understanding  Comment: Educated on OT  POC    Body Mechanics, taught by Josette Huff OT at 7/24/2025  3:16 PM.  Learner: Patient  Readiness: Acceptance  Method: Explanation, Demonstration  Response: Needs Reinforcement, Verbalizes Understanding  Comment: Educated on OT POC    ADL Training, taught by Josette Huff OT at 7/24/2025  3:16 PM.  Learner: Patient  Readiness: Acceptance  Method: Explanation, Demonstration  Response: Needs Reinforcement, Verbalizes Understanding  Comment: Educated on OT POC    Education Comments  No comments found.      Goals:  Encounter Problems       Encounter Problems (Active)       ADLs       Patient will complete daily grooming tasks with supervision level of assistance and PRN adaptive equipment while edge of bed . (Not Progressing)       Start:  07/22/25    Expected End:  08/22/25               COGNITION/SAFETY       Patient will recall and adhere to weight bearing restrictions with all ADL and functional mobility in order to promote healing and safety with functional tasks (Progressing)       Start:  07/22/25    Expected End:  08/22/25               TRANSFERS       Patient will complete functional transfer to toilet/chair with least restrictive device with moderate assist level of assistance. (Not Progressing)       Start:  07/22/25    Expected End:  08/22/25            Patient will complete sit to stand transfer with moderate assist level of assistance and least restrictive device in order to improve safety and prepare for out of bed mobility. (Progressing)       Start:  07/22/25    Expected End:  08/22/25

## 2025-07-24 NOTE — NURSING NOTE
Assumed care of patient. Patient resting in bed with head of bed elevate during BSSR. Call light within reach. No concerns at this time. Plan of care ongoing.

## 2025-07-25 VITALS
TEMPERATURE: 98.8 F | SYSTOLIC BLOOD PRESSURE: 92 MMHG | HEART RATE: 71 BPM | WEIGHT: 181.66 LBS | OXYGEN SATURATION: 95 % | BODY MASS INDEX: 31.01 KG/M2 | DIASTOLIC BLOOD PRESSURE: 50 MMHG | RESPIRATION RATE: 20 BRPM | HEIGHT: 64 IN

## 2025-07-25 LAB
ANION GAP SERPL CALCULATED.3IONS-SCNC: 9 MMOL/L (ref 10–20)
BUN SERPL-MCNC: 25 MG/DL (ref 6–23)
CALCIUM SERPL-MCNC: 8.5 MG/DL (ref 8.6–10.3)
CHLORIDE SERPL-SCNC: 108 MMOL/L (ref 98–107)
CO2 SERPL-SCNC: 26 MMOL/L (ref 21–32)
CREAT SERPL-MCNC: 0.97 MG/DL (ref 0.5–1.05)
EGFRCR SERPLBLD CKD-EPI 2021: 59 ML/MIN/1.73M*2
ERYTHROCYTE [DISTWIDTH] IN BLOOD BY AUTOMATED COUNT: 15.4 % (ref 11.5–14.5)
GLUCOSE BLD MANUAL STRIP-MCNC: 103 MG/DL (ref 74–99)
GLUCOSE BLD MANUAL STRIP-MCNC: 133 MG/DL (ref 74–99)
GLUCOSE SERPL-MCNC: 89 MG/DL (ref 74–99)
HCT VFR BLD AUTO: 26.5 % (ref 36–46)
HGB BLD-MCNC: 8.4 G/DL (ref 12–16)
MCH RBC QN AUTO: 30.1 PG (ref 26–34)
MCHC RBC AUTO-ENTMCNC: 31.7 G/DL (ref 32–36)
MCV RBC AUTO: 95 FL (ref 80–100)
NRBC BLD-RTO: 0 /100 WBCS (ref 0–0)
PLATELET # BLD AUTO: 134 X10*3/UL (ref 150–450)
POTASSIUM SERPL-SCNC: 3.9 MMOL/L (ref 3.5–5.3)
RBC # BLD AUTO: 2.79 X10*6/UL (ref 4–5.2)
SODIUM SERPL-SCNC: 139 MMOL/L (ref 136–145)
WBC # BLD AUTO: 5.2 X10*3/UL (ref 4.4–11.3)

## 2025-07-25 PROCEDURE — 36415 COLL VENOUS BLD VENIPUNCTURE: CPT | Performed by: NURSE PRACTITIONER

## 2025-07-25 PROCEDURE — 2500000001 HC RX 250 WO HCPCS SELF ADMINISTERED DRUGS (ALT 637 FOR MEDICARE OP): Performed by: NURSE PRACTITIONER

## 2025-07-25 PROCEDURE — 2500000004 HC RX 250 GENERAL PHARMACY W/ HCPCS (ALT 636 FOR OP/ED): Performed by: NURSE PRACTITIONER

## 2025-07-25 PROCEDURE — 2500000002 HC RX 250 W HCPCS SELF ADMINISTERED DRUGS (ALT 637 FOR MEDICARE OP, ALT 636 FOR OP/ED): Performed by: NURSE PRACTITIONER

## 2025-07-25 PROCEDURE — 2500000005 HC RX 250 GENERAL PHARMACY W/O HCPCS: Performed by: NURSE PRACTITIONER

## 2025-07-25 PROCEDURE — 85027 COMPLETE CBC AUTOMATED: CPT | Performed by: NURSE PRACTITIONER

## 2025-07-25 PROCEDURE — 82947 ASSAY GLUCOSE BLOOD QUANT: CPT

## 2025-07-25 PROCEDURE — 80048 BASIC METABOLIC PNL TOTAL CA: CPT | Performed by: NURSE PRACTITIONER

## 2025-07-25 PROCEDURE — 99239 HOSP IP/OBS DSCHRG MGMT >30: CPT | Performed by: NURSE PRACTITIONER

## 2025-07-25 RX ORDER — BISACODYL 10 MG/1
10 SUPPOSITORY RECTAL ONCE
Status: COMPLETED | OUTPATIENT
Start: 2025-07-25 | End: 2025-07-25

## 2025-07-25 RX ORDER — ADHESIVE BANDAGE
30 BANDAGE TOPICAL DAILY PRN
Status: DISCONTINUED | OUTPATIENT
Start: 2025-07-25 | End: 2025-07-25 | Stop reason: HOSPADM

## 2025-07-25 RX ADMIN — PANTOPRAZOLE SODIUM 40 MG: 40 INJECTION, POWDER, LYOPHILIZED, FOR SOLUTION INTRAVENOUS at 08:54

## 2025-07-25 RX ADMIN — PSYLLIUM HUSK 1 PACKET: 3.4 POWDER ORAL at 08:55

## 2025-07-25 RX ADMIN — LIDOCAINE 4% 1 PATCH: 40 PATCH TOPICAL at 08:55

## 2025-07-25 RX ADMIN — HYDROCODONE BITARTRATE AND ACETAMINOPHEN 1 TABLET: 5; 325 TABLET ORAL at 13:48

## 2025-07-25 RX ADMIN — DOCUSATE SODIUM 100 MG: 100 CAPSULE, LIQUID FILLED ORAL at 08:55

## 2025-07-25 RX ADMIN — ACETAMINOPHEN 650 MG: 325 TABLET ORAL at 06:34

## 2025-07-25 RX ADMIN — ASPIRIN 325 MG: 325 TABLET, COATED ORAL at 08:55

## 2025-07-25 RX ADMIN — FLUTICASONE PROPIONATE 2 SPRAY: 50 SPRAY, METERED NASAL at 09:26

## 2025-07-25 RX ADMIN — BISACODYL 10 MG: 10 SUPPOSITORY RECTAL at 08:55

## 2025-07-25 RX ADMIN — HYDROCODONE BITARTRATE AND ACETAMINOPHEN 1 TABLET: 5; 325 TABLET ORAL at 08:55

## 2025-07-25 RX ADMIN — POLYETHYLENE GLYCOL 3350 17 G: 17 POWDER, FOR SOLUTION ORAL at 08:55

## 2025-07-25 RX ADMIN — DULOXETINE 60 MG: 60 CAPSULE, DELAYED RELEASE ORAL at 08:55

## 2025-07-25 RX ADMIN — BUSPIRONE HYDROCHLORIDE 10 MG: 10 TABLET ORAL at 08:55

## 2025-07-25 ASSESSMENT — PAIN - FUNCTIONAL ASSESSMENT
PAIN_FUNCTIONAL_ASSESSMENT: 0-10

## 2025-07-25 ASSESSMENT — PAIN DESCRIPTION - ORIENTATION: ORIENTATION: MID

## 2025-07-25 ASSESSMENT — PAIN SCALES - GENERAL
PAINLEVEL_OUTOF10: 2
PAINLEVEL_OUTOF10: 6
PAINLEVEL_OUTOF10: 6

## 2025-07-25 ASSESSMENT — PAIN DESCRIPTION - DESCRIPTORS
DESCRIPTORS: ACHING
DESCRIPTORS: ACHING

## 2025-07-25 ASSESSMENT — PAIN DESCRIPTION - LOCATION: LOCATION: BACK

## 2025-07-25 NOTE — CARE PLAN
The patient's goals for the shift include rest    The clinical goals for the shift include pain management/safety    Over the shift, the patient did not make progress toward the following goals. Barriers to progression include s/p Rt hip ORIF and nailing. Recommendations to address these barriers include follow physician orders and plan of care.

## 2025-07-25 NOTE — PROGRESS NOTES
[facility] can fully accept on this day. Facility requesting 2 pm transport which has been set. Will make bedside Rn aware when able. Will call family and make them aware as well. N2N Report # is [id number].     07/25/25 0826   Discharge Planning   Expected Discharge Disposition SNF  ([facility])   Does the patient need discharge transport arranged? Yes   [facility] coordination needed? Yes   Has discharge transport been arranged? Yes   What day is the transport expected? 07/25/25   What time is the transport expected? 0200

## 2025-07-25 NOTE — NURSING NOTE
No results from previous given suppository, Dionte Juarez, NP aware, will order dose of milk of mag

## 2025-07-25 NOTE — NURSING NOTE
[facility] rounds done with varghese choudhary np,  at bedside, will order suppository for c/o constipation

## 2025-07-25 NOTE — NURSING NOTE
Attempted to call report to kathy simpson at [phone], nurse not able to take report at this time, aware of  scheduled for 2 pm

## 2025-08-21 ENCOUNTER — APPOINTMENT (OUTPATIENT)
Dept: UROLOGY | Facility: CLINIC | Age: 80
End: 2025-08-21
Payer: MEDICARE

## 2025-10-02 ENCOUNTER — APPOINTMENT (OUTPATIENT)
Dept: UROLOGY | Facility: CLINIC | Age: 80
End: 2025-10-02
Payer: MEDICARE

## 2025-11-10 ENCOUNTER — APPOINTMENT (OUTPATIENT)
Dept: PRIMARY CARE | Facility: CLINIC | Age: 80
End: 2025-11-10
Payer: MEDICARE

## (undated) DEVICE — SOLUTION, INJECTION, SODIUM CHLORIDE 9%, 3000ML

## (undated) DEVICE — DRESSING, WOUND, PRIMAPORE, 6 X 3 1/8

## (undated) DEVICE — DRAPE, INCISE, ANTIMICROBIAL, IOBAN 2, LARGE, 17 X 23 IN, DISPOSABLE, STERILE

## (undated) DEVICE — Device

## (undated) DEVICE — COVER, BACK TABLE, 65 X 90, HVY REINFORCED

## (undated) DEVICE — PAD, GROUNDING, ELECTROSURGICAL, W/9 FT CABLE, POLYHESIVE II, ADULT, LF

## (undated) DEVICE — TUBING, SUCTION, 6MM X 10, CLEAN N-COND

## (undated) DEVICE — SUTURE, MONOCRYL, 4-0, 18 IN, PS2, UNDYED

## (undated) DEVICE — BANDAGE, GAUZE, COTTON, STERILE, BULKEE II, 4.5IN X 4.1YD

## (undated) DEVICE — GUIDEWIRE, FLUTE TIP, 400MM

## (undated) DEVICE — DRAPE, ISOLATION, ANTIMICROBIAL, W/POUCH, IOBAN 2, STERI DRAPE, 125 X 83 IN, DISPOSABLE, STERILE

## (undated) DEVICE — DRESSING, MEPILEX BORDER, POST-OP AG, 4 X 6 IN

## (undated) DEVICE — APPLICATOR, CHLORAPREP, W/ORANGE TINT, 26ML

## (undated) DEVICE — SUTURE, VICRYL, 2-0, 36 IN, CT-1, UNDYED

## (undated) DEVICE — DRILL BIT, STEPPED, 6MM/9MM CANNULATED

## (undated) DEVICE — COVER, C-ARM W/CLIPS, OEC GE

## (undated) DEVICE — CAUTERY, PENCIL, PUSH BUTTON, SMOKE EVAC, 70MM

## (undated) DEVICE — BANDAGE, GAUZE, CONFORMING, KERLIX, 6 PLY, 4.5 IN X 4.1 YD

## (undated) DEVICE — SUTURE, MONOCRYL, 2-0, 27 IN, SH/V-20 , UNDYED

## (undated) DEVICE — ELECTRODE, ELECTROSURGICAL, BLADE, INSULATED, ENT/IMA, STERILE

## (undated) DEVICE — GLOVE, PROTEXIS PI CLASSIC, SZ-8.0, PF, PF, LF

## (undated) DEVICE — DRAPE COVER, C ARM, FLOUROSCAN IMAGING SYS

## (undated) DEVICE — GOWN, SURGICAL, REINFORCED, XLARGE, X-LONG, STERILE

## (undated) DEVICE — BANDAGE, ELASTIC, MATRIX, SELF-CLOSURE, 4 IN X 5 YD, LF

## (undated) DEVICE — GLOVE, SURGICAL, PROTEXIS PI , 7.0, PF, LF

## (undated) DEVICE — SUTURE, VICRYL, 0, 36 IN, CT-1, UNDYED

## (undated) DEVICE — SUTURE, PDS II, 0, 27 IN, CT-2, VIOLET

## (undated) DEVICE — SEALANT, HEMOSTATIC, FLOSEAL, 10 ML

## (undated) DEVICE — GLOVE, SURGICAL, PROTEXIS PI ORTHO, 8.0, PF, LF

## (undated) DEVICE — DRILL BIT, 4.2MM 3-FLUTED QC 330MM 100MM CALB

## (undated) DEVICE — STAPLER, SKIN PROXIMATE, 35 WIDE

## (undated) DEVICE — COVER, CART, 45 X 27 X 48 IN, CLEAR

## (undated) DEVICE — BANDAGE, ELASTIC, ACE, ACE, DOUBLE LENGTH, 6 X 550 IN, LF

## (undated) DEVICE — SPONGE, GAUZE, AVANT, STERILE, NONWOVEN, 4PLY, 4 X 4, STANDARD

## (undated) DEVICE — BANDAGE, COFLEX, 6 X 5 YDS, FOAM TAN, STERILE, LF

## (undated) DEVICE — APPLICATOR, PREP, CHLORAPREP, W/ORANGE TINT, 10.5ML

## (undated) DEVICE — STRIP, SKIN CLOSURE, STERI STRIP, REINFORCED, 1/2 X 2 IN

## (undated) DEVICE — IRRIGATION SET, Y, LARGE BORE

## (undated) DEVICE — DRAPE, SHEET, THREE QUARTER, FAN FOLD, 57 X 77 IN

## (undated) DEVICE — SOLUTION, IRRIGATION, X RX SODIUM CHL 0.9%, 1000ML BTL

## (undated) DEVICE — COVER, XRAY, CASSETTE, 21 X 40 IN, STERILE

## (undated) DEVICE — DRAPE, SHEET, U, W/ADHESIVE STRIP, IMPERVIOUS, 60 X 70 IN, DISPOSABLE, LF, STERILE

## (undated) DEVICE — MANIFOLD, 4 PORT NEPTUNE STANDARD

## (undated) DEVICE — TOWEL, SURGICAL, NEURO, O/R, 16 X 26, BLUE, STERILE